# Patient Record
Sex: MALE | Race: WHITE | Employment: OTHER | URBAN - METROPOLITAN AREA
[De-identification: names, ages, dates, MRNs, and addresses within clinical notes are randomized per-mention and may not be internally consistent; named-entity substitution may affect disease eponyms.]

---

## 2017-10-05 ENCOUNTER — HOSPITAL ENCOUNTER (OUTPATIENT)
Dept: PREADMISSION TESTING | Age: 67
Discharge: HOME | End: 2017-10-05
Payer: MEDICARE

## 2017-10-05 VITALS
TEMPERATURE: 97.9 F | DIASTOLIC BLOOD PRESSURE: 84 MMHG | BODY MASS INDEX: 33.33 KG/M2 | SYSTOLIC BLOOD PRESSURE: 118 MMHG | HEART RATE: 96 BPM | WEIGHT: 225 LBS | HEIGHT: 69 IN

## 2017-10-05 LAB
ABO + RH BLD: NORMAL
ANION GAP SERPL CALC-SCNC: 9 MMOL/L (ref 5–15)
APPEARANCE UR: CLEAR
ATRIAL RATE: 78 BPM
BACTERIA URNS QL MICRO: NEGATIVE /HPF
BILIRUB UR QL: NEGATIVE
BLOOD GROUP ANTIBODIES SERPL: NORMAL
BUN SERPL-MCNC: 20 MG/DL (ref 6–20)
BUN/CREAT SERPL: 22 (ref 12–20)
CALCIUM SERPL-MCNC: 8.7 MG/DL (ref 8.5–10.1)
CALCULATED P AXIS, ECG09: 31 DEGREES
CALCULATED R AXIS, ECG10: -26 DEGREES
CALCULATED T AXIS, ECG11: 16 DEGREES
CHLORIDE SERPL-SCNC: 101 MMOL/L (ref 97–108)
CO2 SERPL-SCNC: 27 MMOL/L (ref 21–32)
COLOR UR: NORMAL
CREAT SERPL-MCNC: 0.92 MG/DL (ref 0.7–1.3)
DIAGNOSIS, 93000: NORMAL
EPITH CASTS URNS QL MICRO: NORMAL /LPF
ERYTHROCYTE [DISTWIDTH] IN BLOOD BY AUTOMATED COUNT: 13 % (ref 11.5–14.5)
EST. AVERAGE GLUCOSE BLD GHB EST-MCNC: 166 MG/DL
GLUCOSE SERPL-MCNC: 108 MG/DL (ref 65–100)
GLUCOSE UR STRIP.AUTO-MCNC: NEGATIVE MG/DL
HBA1C MFR BLD: 7.4 % (ref 4.2–6.3)
HCT VFR BLD AUTO: 40.9 % (ref 36.6–50.3)
HGB BLD-MCNC: 14 G/DL (ref 12.1–17)
HGB UR QL STRIP: NEGATIVE
HYALINE CASTS URNS QL MICRO: NORMAL /LPF (ref 0–5)
INR PPP: 1 (ref 0.9–1.1)
KETONES UR QL STRIP.AUTO: NEGATIVE MG/DL
LEUKOCYTE ESTERASE UR QL STRIP.AUTO: NEGATIVE
MCH RBC QN AUTO: 32 PG (ref 26–34)
MCHC RBC AUTO-ENTMCNC: 34.2 G/DL (ref 30–36.5)
MCV RBC AUTO: 93.6 FL (ref 80–99)
NITRITE UR QL STRIP.AUTO: NEGATIVE
P-R INTERVAL, ECG05: 212 MS
PH UR STRIP: 5.5 [PH] (ref 5–8)
PLATELET # BLD AUTO: 155 K/UL (ref 150–400)
POTASSIUM SERPL-SCNC: 4.4 MMOL/L (ref 3.5–5.1)
PROT UR STRIP-MCNC: NEGATIVE MG/DL
PROTHROMBIN TIME: 10.5 SEC (ref 9–11.1)
Q-T INTERVAL, ECG07: 366 MS
QRS DURATION, ECG06: 82 MS
QTC CALCULATION (BEZET), ECG08: 417 MS
RBC # BLD AUTO: 4.37 M/UL (ref 4.1–5.7)
RBC #/AREA URNS HPF: NORMAL /HPF (ref 0–5)
SODIUM SERPL-SCNC: 137 MMOL/L (ref 136–145)
SP GR UR REFRACTOMETRY: 1.02 (ref 1–1.03)
SPECIMEN EXP DATE BLD: NORMAL
UA: UC IF INDICATED,UAUC: NORMAL
UROBILINOGEN UR QL STRIP.AUTO: 0.2 EU/DL (ref 0.2–1)
VENTRICULAR RATE, ECG03: 78 BPM
WBC # BLD AUTO: 8.7 K/UL (ref 4.1–11.1)
WBC URNS QL MICRO: NORMAL /HPF (ref 0–4)

## 2017-10-05 PROCEDURE — 83036 HEMOGLOBIN GLYCOSYLATED A1C: CPT | Performed by: ORTHOPAEDIC SURGERY

## 2017-10-05 PROCEDURE — 86900 BLOOD TYPING SEROLOGIC ABO: CPT | Performed by: ORTHOPAEDIC SURGERY

## 2017-10-05 PROCEDURE — 81001 URINALYSIS AUTO W/SCOPE: CPT | Performed by: ORTHOPAEDIC SURGERY

## 2017-10-05 PROCEDURE — 93005 ELECTROCARDIOGRAM TRACING: CPT

## 2017-10-05 PROCEDURE — 85027 COMPLETE CBC AUTOMATED: CPT | Performed by: ORTHOPAEDIC SURGERY

## 2017-10-05 PROCEDURE — 80048 BASIC METABOLIC PNL TOTAL CA: CPT | Performed by: ORTHOPAEDIC SURGERY

## 2017-10-05 PROCEDURE — 85610 PROTHROMBIN TIME: CPT | Performed by: ORTHOPAEDIC SURGERY

## 2017-10-05 PROCEDURE — 36415 COLL VENOUS BLD VENIPUNCTURE: CPT | Performed by: ORTHOPAEDIC SURGERY

## 2017-10-05 RX ORDER — DIAZEPAM 5 MG/1
5 TABLET ORAL AS NEEDED
COMMUNITY

## 2017-10-05 RX ORDER — GLIMEPIRIDE 4 MG/1
TABLET ORAL 2 TIMES DAILY
COMMUNITY
End: 2019-07-01

## 2017-10-05 RX ORDER — SIMVASTATIN 40 MG/1
40 TABLET, FILM COATED ORAL
COMMUNITY

## 2017-10-05 RX ORDER — TRAMADOL HYDROCHLORIDE 50 MG/1
50 TABLET ORAL 2 TIMES DAILY
COMMUNITY
End: 2017-10-31

## 2017-10-05 RX ORDER — ESCITALOPRAM OXALATE 20 MG/1
20 TABLET ORAL
COMMUNITY
End: 2019-07-01

## 2017-10-05 RX ORDER — BUTALBITAL, ACETAMINOPHEN AND CAFFEINE 50; 325; 40 MG/1; MG/1; MG/1
1 TABLET ORAL
COMMUNITY
End: 2019-07-01

## 2017-10-06 LAB
BACTERIA SPEC CULT: NORMAL
BACTERIA SPEC CULT: NORMAL
SERVICE CMNT-IMP: NORMAL

## 2017-10-26 NOTE — H&P
PCP: Lexx Sandhu MD       Patient Active Problem List     Diagnosis Date Noted    Type 2 diabetes mellitus without complication 97/89/4353    Essential hypertension 09/28/2017    Coagulopathy 09/28/2017    High cholesterol 09/28/2017            Subjective   Subjective:   Chief Complaint: Pain of the Right Hip        HPI: Sue Muller is a 74 y.o. male who comes in today with chronic progressive right hip and groin pain. It now limits his everyday activities. He is no longer able to walk for exercise. He complains of stiffness and pain. He is having night pain. He has been taking tramadol for pain. He does have a coagulopathy and is followed by [facility] hemat[facility] for this. He denies any radicular symptoms. He was seen by his primary care physician and had x-rays confirming severe end-stage osteoarthritis.                   Objective   Objective:          Vitals:     09/28/17 1041   BP: 114/64   Pulse: (!) 98      Height: 6'   Weight: 219 lb   Body mass index is 29.7 kg/(m^2).     Constitutional:  No acute distress. Well nourished. Well developed. Eyes:  Sclera are nonicteric. Respiratory:  No labored breathing. Cardiovascular:  No marked edema. Skin:  No marked skin ulcers. Neurological:  No marked sensory loss noted. Psychiatric: Alert and oriented x3. Musculoskeletal :  He has normal range of motion of the left hip. He has a fixed external rotation and flexion contracture of the right hip with essentially no motion of the right hip. He has severe pain with attempts to move the right hip. He has normal range of motion of bilateral knees without effusion or crepitus. He has no instability in his knees. He has a strong pedal pulse. He has no pedal edema. His skin is healthy and intact. He has no abnormal bruising or petechiae.  His right leg is shorter than the left by approximately 1 inch.        Radiographs:    X-rays that he brings with him show severe end-stage osteoarthritis of the right hip with a normal left hip. The right hip has complete loss of joint space with subchondral sclerosis and cyst formation.              Assessment   Assessment:      1. Primary osteoarthritis of right hip                Plan   Plan:     I reviewed his x-ray findings with him and his wife. He has severe end-stage osteoarthritis of the right hip. We discussed treatment options. He is anxious to proceed with hip replacement. I went over the procedure in detail including expected outcomes and postop recovery as well as risks and complications specifically DVT, PE, infection, dislocation, leg length discrepancy, nerve injury. I discussed his case with his  hematologist to will get get him in and evaluate him. We will need to address the issue of intraoperative bleeding as well as recommendations for postoperative DVT prophylaxis. We will set this up for him at his convenience.     No orders of the defined types were placed in this encounter.      Return for scheduled surgery.            Josefa Mendoza MD   9/28/2017  7:56 PM

## 2017-10-27 ENCOUNTER — ANESTHESIA EVENT (OUTPATIENT)
Dept: SURGERY | Age: 67
DRG: 470 | End: 2017-10-27
Payer: MEDICARE

## 2017-10-30 ENCOUNTER — APPOINTMENT (OUTPATIENT)
Dept: GENERAL RADIOLOGY | Age: 67
DRG: 470 | End: 2017-10-30
Attending: ORTHOPAEDIC SURGERY
Payer: MEDICARE

## 2017-10-30 ENCOUNTER — HOSPITAL ENCOUNTER (INPATIENT)
Age: 67
LOS: 1 days | Discharge: HOME | DRG: 470 | End: 2017-10-31
Attending: ORTHOPAEDIC SURGERY | Admitting: ORTHOPAEDIC SURGERY
Payer: MEDICARE

## 2017-10-30 ENCOUNTER — ANESTHESIA (OUTPATIENT)
Dept: SURGERY | Age: 67
DRG: 470 | End: 2017-10-30
Payer: MEDICARE

## 2017-10-30 PROBLEM — M16.11 PRIMARY OSTEOARTHRITIS OF RIGHT HIP: Status: ACTIVE | Noted: 2017-10-30

## 2017-10-30 LAB
ABO + RH BLD: NORMAL
BLOOD GROUP ANTIBODIES SERPL: NORMAL
EST. AVERAGE GLUCOSE BLD GHB EST-MCNC: 157 MG/DL
GLUCOSE BLD STRIP.AUTO-MCNC: 187 MG/DL (ref 65–100)
GLUCOSE BLD STRIP.AUTO-MCNC: 207 MG/DL (ref 65–100)
GLUCOSE BLD STRIP.AUTO-MCNC: 366 MG/DL (ref 65–100)
HBA1C MFR BLD: 7.1 % (ref 4.2–6.3)
SERVICE CMNT-IMP: ABNORMAL
SPECIMEN EXP DATE BLD: NORMAL

## 2017-10-30 PROCEDURE — 82962 GLUCOSE BLOOD TEST: CPT

## 2017-10-30 PROCEDURE — 73501 X-RAY EXAM HIP UNI 1 VIEW: CPT

## 2017-10-30 PROCEDURE — 77030006835 HC BLD SAW SAG STRY -B: Performed by: ORTHOPAEDIC SURGERY

## 2017-10-30 PROCEDURE — 77030002933 HC SUT MCRYL J&J -A: Performed by: ORTHOPAEDIC SURGERY

## 2017-10-30 PROCEDURE — 74011250637 HC RX REV CODE- 250/637: Performed by: PHYSICIAN ASSISTANT

## 2017-10-30 PROCEDURE — 86900 BLOOD TYPING SEROLOGIC ABO: CPT | Performed by: ORTHOPAEDIC SURGERY

## 2017-10-30 PROCEDURE — 77030012935 HC DRSG AQUACEL BMS -B: Performed by: ORTHOPAEDIC SURGERY

## 2017-10-30 PROCEDURE — 76060000035 HC ANESTHESIA 2 TO 2.5 HR: Performed by: ORTHOPAEDIC SURGERY

## 2017-10-30 PROCEDURE — 77030018836 HC SOL IRR NACL ICUM -A: Performed by: ORTHOPAEDIC SURGERY

## 2017-10-30 PROCEDURE — 65270000029 HC RM PRIVATE

## 2017-10-30 PROCEDURE — 74011250636 HC RX REV CODE- 250/636: Performed by: ANESTHESIOLOGY

## 2017-10-30 PROCEDURE — 83036 HEMOGLOBIN GLYCOSYLATED A1C: CPT | Performed by: PHYSICIAN ASSISTANT

## 2017-10-30 PROCEDURE — 76210000000 HC OR PH I REC 2 TO 2.5 HR: Performed by: ORTHOPAEDIC SURGERY

## 2017-10-30 PROCEDURE — 74011250636 HC RX REV CODE- 250/636

## 2017-10-30 PROCEDURE — C1776 JOINT DEVICE (IMPLANTABLE): HCPCS | Performed by: ORTHOPAEDIC SURGERY

## 2017-10-30 PROCEDURE — 77030010507 HC ADH SKN DERMBND J&J -B: Performed by: ORTHOPAEDIC SURGERY

## 2017-10-30 PROCEDURE — 0SR903Z REPLACEMENT OF RIGHT HIP JOINT WITH CERAMIC SYNTHETIC SUBSTITUTE, OPEN APPROACH: ICD-10-PCS | Performed by: ORTHOPAEDIC SURGERY

## 2017-10-30 PROCEDURE — 74011250636 HC RX REV CODE- 250/636: Performed by: PHYSICIAN ASSISTANT

## 2017-10-30 PROCEDURE — 77030008684 HC TU ET CUF COVD -B: Performed by: ANESTHESIOLOGY

## 2017-10-30 PROCEDURE — 77030003666 HC NDL SPINAL BD -A: Performed by: ORTHOPAEDIC SURGERY

## 2017-10-30 PROCEDURE — 76010000171 HC OR TIME 2 TO 2.5 HR INTENSV-TIER 1: Performed by: ORTHOPAEDIC SURGERY

## 2017-10-30 PROCEDURE — 77030011264 HC ELECTRD BLD EXT COVD -A: Performed by: ORTHOPAEDIC SURGERY

## 2017-10-30 PROCEDURE — 77030034850: Performed by: ORTHOPAEDIC SURGERY

## 2017-10-30 PROCEDURE — 74011000258 HC RX REV CODE- 258

## 2017-10-30 PROCEDURE — 77030020782 HC GWN BAIR PAWS FLX 3M -B

## 2017-10-30 PROCEDURE — 74011000250 HC RX REV CODE- 250: Performed by: ORTHOPAEDIC SURGERY

## 2017-10-30 PROCEDURE — 74011636637 HC RX REV CODE- 636/637: Performed by: PHYSICIAN ASSISTANT

## 2017-10-30 PROCEDURE — 77030035236 HC SUT PDS STRATFX BARB J&J -B: Performed by: ORTHOPAEDIC SURGERY

## 2017-10-30 PROCEDURE — 77030032490 HC SLV COMPR SCD KNE COVD -B: Performed by: ORTHOPAEDIC SURGERY

## 2017-10-30 PROCEDURE — 76000 FLUOROSCOPY <1 HR PHYS/QHP: CPT

## 2017-10-30 PROCEDURE — 77030020788: Performed by: ORTHOPAEDIC SURGERY

## 2017-10-30 PROCEDURE — 77030011640 HC PAD GRND REM COVD -A: Performed by: ORTHOPAEDIC SURGERY

## 2017-10-30 PROCEDURE — 36415 COLL VENOUS BLD VENIPUNCTURE: CPT | Performed by: PHYSICIAN ASSISTANT

## 2017-10-30 PROCEDURE — 74011000250 HC RX REV CODE- 250

## 2017-10-30 PROCEDURE — 77030018846 HC SOL IRR STRL H20 ICUM -A: Performed by: ORTHOPAEDIC SURGERY

## 2017-10-30 PROCEDURE — 77030031139 HC SUT VCRL2 J&J -A: Performed by: ORTHOPAEDIC SURGERY

## 2017-10-30 PROCEDURE — 74011250636 HC RX REV CODE- 250/636: Performed by: ORTHOPAEDIC SURGERY

## 2017-10-30 PROCEDURE — 74011636637 HC RX REV CODE- 636/637: Performed by: ORTHOPAEDIC SURGERY

## 2017-10-30 DEVICE — HEAD FEM DIA36MM +1.5MM OFFSET 12/14 TAPR HIP CERAMIC: Type: IMPLANTABLE DEVICE | Site: HIP | Status: FUNCTIONAL

## 2017-10-30 DEVICE — CUP ACET DIA52MM HIP GRIPTION PRI CEMENTLESS FIX SECT SER: Type: IMPLANTABLE DEVICE | Site: HIP | Status: FUNCTIONAL

## 2017-10-30 DEVICE — LINER ACET OD52MM ID36MM HIP ALTRX PINN: Type: IMPLANTABLE DEVICE | Site: HIP | Status: FUNCTIONAL

## 2017-10-30 DEVICE — SCREW BNE L25MM DIA6.5MM CANC HIP S STL GRIPTION FULL THRD: Type: IMPLANTABLE DEVICE | Site: HIP | Status: FUNCTIONAL

## 2017-10-30 DEVICE — STEM FEMORAL SUMMIT: Type: IMPLANTABLE DEVICE | Site: HIP | Status: FUNCTIONAL

## 2017-10-30 DEVICE — COMPONENT TOT HIP PRIMARY CERM ALTRX: Type: IMPLANTABLE DEVICE | Status: FUNCTIONAL

## 2017-10-30 RX ORDER — MELOXICAM 7.5 MG/1
15 TABLET ORAL DAILY
Status: DISCONTINUED | OUTPATIENT
Start: 2017-10-31 | End: 2017-10-31 | Stop reason: HOSPADM

## 2017-10-30 RX ORDER — CEFAZOLIN SODIUM IN 0.9 % NACL 2 G/50 ML
2 INTRAVENOUS SOLUTION, PIGGYBACK (ML) INTRAVENOUS EVERY 8 HOURS
Status: COMPLETED | OUTPATIENT
Start: 2017-10-30 | End: 2017-10-31

## 2017-10-30 RX ORDER — CELECOXIB 200 MG/1
200 CAPSULE ORAL ONCE
Status: COMPLETED | OUTPATIENT
Start: 2017-10-30 | End: 2017-10-30

## 2017-10-30 RX ORDER — HYDROXYZINE HYDROCHLORIDE 10 MG/1
10 TABLET, FILM COATED ORAL
Status: DISCONTINUED | OUTPATIENT
Start: 2017-10-30 | End: 2017-10-31 | Stop reason: HOSPADM

## 2017-10-30 RX ORDER — GLYCOPYRROLATE 0.2 MG/ML
0.2 INJECTION INTRAMUSCULAR; INTRAVENOUS
Status: DISCONTINUED | OUTPATIENT
Start: 2017-10-30 | End: 2017-10-30 | Stop reason: HOSPADM

## 2017-10-30 RX ORDER — POLYETHYLENE GLYCOL 3350 17 G/17G
17 POWDER, FOR SOLUTION ORAL DAILY
Status: DISCONTINUED | OUTPATIENT
Start: 2017-10-31 | End: 2017-10-31 | Stop reason: HOSPADM

## 2017-10-30 RX ORDER — BUPIVACAINE HYDROCHLORIDE AND EPINEPHRINE 2.5; 5 MG/ML; UG/ML
INJECTION, SOLUTION EPIDURAL; INFILTRATION; INTRACAUDAL; PERINEURAL AS NEEDED
Status: DISCONTINUED | OUTPATIENT
Start: 2017-10-30 | End: 2017-10-30 | Stop reason: HOSPADM

## 2017-10-30 RX ORDER — SODIUM CHLORIDE, SODIUM LACTATE, POTASSIUM CHLORIDE, CALCIUM CHLORIDE 600; 310; 30; 20 MG/100ML; MG/100ML; MG/100ML; MG/100ML
1000 INJECTION, SOLUTION INTRAVENOUS CONTINUOUS
Status: DISCONTINUED | OUTPATIENT
Start: 2017-10-30 | End: 2017-10-30 | Stop reason: HOSPADM

## 2017-10-30 RX ORDER — OXYCODONE HYDROCHLORIDE 5 MG/1
5 TABLET ORAL
Status: DISCONTINUED | OUTPATIENT
Start: 2017-10-30 | End: 2017-10-31 | Stop reason: HOSPADM

## 2017-10-30 RX ORDER — OXYCODONE HYDROCHLORIDE 5 MG/1
7.5-1 TABLET ORAL
Status: DISCONTINUED | OUTPATIENT
Start: 2017-10-30 | End: 2017-10-31 | Stop reason: HOSPADM

## 2017-10-30 RX ORDER — DEXAMETHASONE SODIUM PHOSPHATE 4 MG/ML
INJECTION, SOLUTION INTRA-ARTICULAR; INTRALESIONAL; INTRAMUSCULAR; INTRAVENOUS; SOFT TISSUE AS NEEDED
Status: DISCONTINUED | OUTPATIENT
Start: 2017-10-30 | End: 2017-10-30 | Stop reason: HOSPADM

## 2017-10-30 RX ORDER — ACETAMINOPHEN 500 MG
1000 TABLET ORAL EVERY 6 HOURS
Status: DISCONTINUED | OUTPATIENT
Start: 2017-10-30 | End: 2017-10-31 | Stop reason: HOSPADM

## 2017-10-30 RX ORDER — ROPIVACAINE HYDROCHLORIDE 5 MG/ML
30 INJECTION, SOLUTION EPIDURAL; INFILTRATION; PERINEURAL AS NEEDED
Status: DISCONTINUED | OUTPATIENT
Start: 2017-10-30 | End: 2017-10-30 | Stop reason: HOSPADM

## 2017-10-30 RX ORDER — LIDOCAINE HYDROCHLORIDE 10 MG/ML
0.1 INJECTION, SOLUTION EPIDURAL; INFILTRATION; INTRACAUDAL; PERINEURAL AS NEEDED
Status: DISCONTINUED | OUTPATIENT
Start: 2017-10-30 | End: 2017-10-30 | Stop reason: HOSPADM

## 2017-10-30 RX ORDER — MORPHINE SULFATE 10 MG/ML
2 INJECTION, SOLUTION INTRAMUSCULAR; INTRAVENOUS
Status: DISCONTINUED | OUTPATIENT
Start: 2017-10-30 | End: 2017-10-30 | Stop reason: HOSPADM

## 2017-10-30 RX ORDER — DEXMEDETOMIDINE HYDROCHLORIDE 4 UG/ML
INJECTION, SOLUTION INTRAVENOUS AS NEEDED
Status: DISCONTINUED | OUTPATIENT
Start: 2017-10-30 | End: 2017-10-30 | Stop reason: HOSPADM

## 2017-10-30 RX ORDER — LOSARTAN POTASSIUM 50 MG/1
50 TABLET ORAL DAILY
Status: DISCONTINUED | OUTPATIENT
Start: 2017-10-31 | End: 2017-10-31 | Stop reason: HOSPADM

## 2017-10-30 RX ORDER — CEFAZOLIN SODIUM IN 0.9 % NACL 2 G/100 ML
PLASTIC BAG, INJECTION (ML) INTRAVENOUS AS NEEDED
Status: DISCONTINUED | OUTPATIENT
Start: 2017-10-30 | End: 2017-10-30 | Stop reason: HOSPADM

## 2017-10-30 RX ORDER — HYDROMORPHONE HYDROCHLORIDE 1 MG/ML
0.2 INJECTION, SOLUTION INTRAMUSCULAR; INTRAVENOUS; SUBCUTANEOUS
Status: DISCONTINUED | OUTPATIENT
Start: 2017-10-30 | End: 2017-10-30 | Stop reason: HOSPADM

## 2017-10-30 RX ORDER — ACETAMINOPHEN 500 MG
1000 TABLET ORAL ONCE
Status: COMPLETED | OUTPATIENT
Start: 2017-10-30 | End: 2017-10-30

## 2017-10-30 RX ORDER — ESCITALOPRAM OXALATE 10 MG/1
20 TABLET ORAL
Status: DISCONTINUED | OUTPATIENT
Start: 2017-10-31 | End: 2017-10-31 | Stop reason: HOSPADM

## 2017-10-30 RX ORDER — DIPHENHYDRAMINE HYDROCHLORIDE 50 MG/ML
12.5 INJECTION, SOLUTION INTRAMUSCULAR; INTRAVENOUS AS NEEDED
Status: DISCONTINUED | OUTPATIENT
Start: 2017-10-30 | End: 2017-10-30 | Stop reason: HOSPADM

## 2017-10-30 RX ORDER — INSULIN LISPRO 100 [IU]/ML
INJECTION, SOLUTION INTRAVENOUS; SUBCUTANEOUS
Status: DISCONTINUED | OUTPATIENT
Start: 2017-10-30 | End: 2017-10-31 | Stop reason: HOSPADM

## 2017-10-30 RX ORDER — FENTANYL CITRATE 50 UG/ML
25 INJECTION, SOLUTION INTRAMUSCULAR; INTRAVENOUS
Status: DISCONTINUED | OUTPATIENT
Start: 2017-10-30 | End: 2017-10-30 | Stop reason: HOSPADM

## 2017-10-30 RX ORDER — MAGNESIUM SULFATE 100 %
4 CRYSTALS MISCELLANEOUS AS NEEDED
Status: DISCONTINUED | OUTPATIENT
Start: 2017-10-30 | End: 2017-10-31 | Stop reason: HOSPADM

## 2017-10-30 RX ORDER — MIDAZOLAM HYDROCHLORIDE 1 MG/ML
0.5 INJECTION, SOLUTION INTRAMUSCULAR; INTRAVENOUS
Status: DISCONTINUED | OUTPATIENT
Start: 2017-10-30 | End: 2017-10-30 | Stop reason: HOSPADM

## 2017-10-30 RX ORDER — SODIUM CHLORIDE 0.9 % (FLUSH) 0.9 %
5-10 SYRINGE (ML) INJECTION EVERY 8 HOURS
Status: DISCONTINUED | OUTPATIENT
Start: 2017-10-31 | End: 2017-10-31 | Stop reason: HOSPADM

## 2017-10-30 RX ORDER — AMOXICILLIN 250 MG
1 CAPSULE ORAL 2 TIMES DAILY
Status: DISCONTINUED | OUTPATIENT
Start: 2017-10-30 | End: 2017-10-31 | Stop reason: HOSPADM

## 2017-10-30 RX ORDER — ONDANSETRON 2 MG/ML
4 INJECTION INTRAMUSCULAR; INTRAVENOUS AS NEEDED
Status: DISCONTINUED | OUTPATIENT
Start: 2017-10-30 | End: 2017-10-30 | Stop reason: HOSPADM

## 2017-10-30 RX ORDER — MIDAZOLAM HYDROCHLORIDE 1 MG/ML
INJECTION, SOLUTION INTRAMUSCULAR; INTRAVENOUS AS NEEDED
Status: DISCONTINUED | OUTPATIENT
Start: 2017-10-30 | End: 2017-10-30 | Stop reason: HOSPADM

## 2017-10-30 RX ORDER — HYDROCODONE BITARTRATE AND ACETAMINOPHEN 5; 325 MG/1; MG/1
1 TABLET ORAL AS NEEDED
Status: DISCONTINUED | OUTPATIENT
Start: 2017-10-30 | End: 2017-10-30 | Stop reason: HOSPADM

## 2017-10-30 RX ORDER — KETAMINE HYDROCHLORIDE 50 MG/ML
INJECTION, SOLUTION INTRAMUSCULAR; INTRAVENOUS AS NEEDED
Status: DISCONTINUED | OUTPATIENT
Start: 2017-10-30 | End: 2017-10-30 | Stop reason: HOSPADM

## 2017-10-30 RX ORDER — MORPHINE SULFATE 2 MG/ML
2 INJECTION, SOLUTION INTRAMUSCULAR; INTRAVENOUS
Status: DISCONTINUED | OUTPATIENT
Start: 2017-10-30 | End: 2017-10-31 | Stop reason: HOSPADM

## 2017-10-30 RX ORDER — CEFAZOLIN SODIUM IN 0.9 % NACL 2 G/50 ML
2 INTRAVENOUS SOLUTION, PIGGYBACK (ML) INTRAVENOUS ONCE
Status: DISCONTINUED | OUTPATIENT
Start: 2017-10-30 | End: 2017-10-30 | Stop reason: HOSPADM

## 2017-10-30 RX ORDER — SIMVASTATIN 40 MG/1
40 TABLET, FILM COATED ORAL
Status: DISCONTINUED | OUTPATIENT
Start: 2017-10-30 | End: 2017-10-31 | Stop reason: HOSPADM

## 2017-10-30 RX ORDER — DEXTROSE 50 % IN WATER (D50W) INTRAVENOUS SYRINGE
12.5-25 AS NEEDED
Status: DISCONTINUED | OUTPATIENT
Start: 2017-10-30 | End: 2017-10-31 | Stop reason: HOSPADM

## 2017-10-30 RX ORDER — DIAZEPAM 2 MG/1
5 TABLET ORAL
Status: DISCONTINUED | OUTPATIENT
Start: 2017-10-30 | End: 2017-10-31 | Stop reason: HOSPADM

## 2017-10-30 RX ORDER — ONDANSETRON 2 MG/ML
INJECTION INTRAMUSCULAR; INTRAVENOUS AS NEEDED
Status: DISCONTINUED | OUTPATIENT
Start: 2017-10-30 | End: 2017-10-30 | Stop reason: HOSPADM

## 2017-10-30 RX ORDER — SODIUM CHLORIDE 0.9 % (FLUSH) 0.9 %
5-10 SYRINGE (ML) INJECTION AS NEEDED
Status: DISCONTINUED | OUTPATIENT
Start: 2017-10-30 | End: 2017-10-31 | Stop reason: HOSPADM

## 2017-10-30 RX ORDER — INSULIN LISPRO 100 [IU]/ML
4 INJECTION, SOLUTION INTRAVENOUS; SUBCUTANEOUS ONCE
Status: COMPLETED | OUTPATIENT
Start: 2017-10-30 | End: 2017-10-30

## 2017-10-30 RX ORDER — DEXAMETHASONE SODIUM PHOSPHATE 4 MG/ML
4 INJECTION, SOLUTION INTRA-ARTICULAR; INTRALESIONAL; INTRAMUSCULAR; INTRAVENOUS; SOFT TISSUE
Status: DISCONTINUED | OUTPATIENT
Start: 2017-10-30 | End: 2017-10-31 | Stop reason: HOSPADM

## 2017-10-30 RX ORDER — SODIUM CHLORIDE 9 MG/ML
25 INJECTION, SOLUTION INTRAVENOUS CONTINUOUS
Status: DISCONTINUED | OUTPATIENT
Start: 2017-10-30 | End: 2017-10-30 | Stop reason: HOSPADM

## 2017-10-30 RX ORDER — NALOXONE HYDROCHLORIDE 0.4 MG/ML
0.4 INJECTION, SOLUTION INTRAMUSCULAR; INTRAVENOUS; SUBCUTANEOUS AS NEEDED
Status: DISCONTINUED | OUTPATIENT
Start: 2017-10-30 | End: 2017-10-31 | Stop reason: HOSPADM

## 2017-10-30 RX ORDER — HYDROMORPHONE HYDROCHLORIDE 1 MG/ML
INJECTION, SOLUTION INTRAMUSCULAR; INTRAVENOUS; SUBCUTANEOUS AS NEEDED
Status: DISCONTINUED | OUTPATIENT
Start: 2017-10-30 | End: 2017-10-30 | Stop reason: HOSPADM

## 2017-10-30 RX ORDER — FENTANYL CITRATE 50 UG/ML
INJECTION, SOLUTION INTRAMUSCULAR; INTRAVENOUS AS NEEDED
Status: DISCONTINUED | OUTPATIENT
Start: 2017-10-30 | End: 2017-10-30 | Stop reason: HOSPADM

## 2017-10-30 RX ORDER — ALBUTEROL SULFATE 0.83 MG/ML
2.5 SOLUTION RESPIRATORY (INHALATION) AS NEEDED
Status: DISCONTINUED | OUTPATIENT
Start: 2017-10-30 | End: 2017-10-30 | Stop reason: HOSPADM

## 2017-10-30 RX ORDER — FENTANYL CITRATE 50 UG/ML
50 INJECTION, SOLUTION INTRAMUSCULAR; INTRAVENOUS AS NEEDED
Status: DISCONTINUED | OUTPATIENT
Start: 2017-10-30 | End: 2017-10-30 | Stop reason: HOSPADM

## 2017-10-30 RX ORDER — ONDANSETRON 2 MG/ML
4 INJECTION INTRAMUSCULAR; INTRAVENOUS
Status: DISCONTINUED | OUTPATIENT
Start: 2017-10-30 | End: 2017-10-31 | Stop reason: HOSPADM

## 2017-10-30 RX ORDER — MIDAZOLAM HYDROCHLORIDE 1 MG/ML
1 INJECTION, SOLUTION INTRAMUSCULAR; INTRAVENOUS AS NEEDED
Status: DISCONTINUED | OUTPATIENT
Start: 2017-10-30 | End: 2017-10-30 | Stop reason: HOSPADM

## 2017-10-30 RX ORDER — PHENYLEPHRINE HCL IN 0.9% NACL 0.4MG/10ML
SYRINGE (ML) INTRAVENOUS AS NEEDED
Status: DISCONTINUED | OUTPATIENT
Start: 2017-10-30 | End: 2017-10-30 | Stop reason: HOSPADM

## 2017-10-30 RX ORDER — FAMOTIDINE 20 MG/1
20 TABLET, FILM COATED ORAL 2 TIMES DAILY
Status: DISCONTINUED | OUTPATIENT
Start: 2017-10-30 | End: 2017-10-31 | Stop reason: HOSPADM

## 2017-10-30 RX ORDER — LIDOCAINE HYDROCHLORIDE 20 MG/ML
INJECTION, SOLUTION EPIDURAL; INFILTRATION; INTRACAUDAL; PERINEURAL AS NEEDED
Status: DISCONTINUED | OUTPATIENT
Start: 2017-10-30 | End: 2017-10-30 | Stop reason: HOSPADM

## 2017-10-30 RX ORDER — ROCURONIUM BROMIDE 10 MG/ML
INJECTION, SOLUTION INTRAVENOUS AS NEEDED
Status: DISCONTINUED | OUTPATIENT
Start: 2017-10-30 | End: 2017-10-30 | Stop reason: HOSPADM

## 2017-10-30 RX ORDER — PROPOFOL 10 MG/ML
INJECTION, EMULSION INTRAVENOUS AS NEEDED
Status: DISCONTINUED | OUTPATIENT
Start: 2017-10-30 | End: 2017-10-30 | Stop reason: HOSPADM

## 2017-10-30 RX ORDER — SODIUM CHLORIDE 9 MG/ML
125 INJECTION, SOLUTION INTRAVENOUS CONTINUOUS
Status: DISCONTINUED | OUTPATIENT
Start: 2017-10-30 | End: 2017-10-31 | Stop reason: HOSPADM

## 2017-10-30 RX ORDER — FACIAL-BODY WIPES
10 EACH TOPICAL DAILY PRN
Status: DISCONTINUED | OUTPATIENT
Start: 2017-11-01 | End: 2017-10-31 | Stop reason: HOSPADM

## 2017-10-30 RX ORDER — SUCCINYLCHOLINE CHLORIDE 20 MG/ML
INJECTION INTRAMUSCULAR; INTRAVENOUS AS NEEDED
Status: DISCONTINUED | OUTPATIENT
Start: 2017-10-30 | End: 2017-10-30 | Stop reason: HOSPADM

## 2017-10-30 RX ORDER — PREGABALIN 75 MG/1
75 CAPSULE ORAL ONCE
Status: COMPLETED | OUTPATIENT
Start: 2017-10-30 | End: 2017-10-30

## 2017-10-30 RX ADMIN — SUCCINYLCHOLINE CHLORIDE 140 MG: 20 INJECTION INTRAMUSCULAR; INTRAVENOUS at 14:48

## 2017-10-30 RX ADMIN — MIDAZOLAM HYDROCHLORIDE 2 MG: 1 INJECTION, SOLUTION INTRAMUSCULAR; INTRAVENOUS at 14:42

## 2017-10-30 RX ADMIN — ACETAMINOPHEN 1000 MG: 500 TABLET, FILM COATED ORAL at 13:04

## 2017-10-30 RX ADMIN — Medication 80 MCG: at 16:40

## 2017-10-30 RX ADMIN — SODIUM CHLORIDE, SODIUM LACTATE, POTASSIUM CHLORIDE, AND CALCIUM CHLORIDE 1000 ML: 600; 310; 30; 20 INJECTION, SOLUTION INTRAVENOUS at 12:54

## 2017-10-30 RX ADMIN — ACETAMINOPHEN 1000 MG: 500 TABLET, FILM COATED ORAL at 20:19

## 2017-10-30 RX ADMIN — FAMOTIDINE 20 MG: 20 TABLET, FILM COATED ORAL at 20:19

## 2017-10-30 RX ADMIN — INSULIN LISPRO 3 UNITS: 100 INJECTION, SOLUTION INTRAVENOUS; SUBCUTANEOUS at 17:33

## 2017-10-30 RX ADMIN — DEXMEDETOMIDINE HYDROCHLORIDE 4 MCG: 4 INJECTION, SOLUTION INTRAVENOUS at 16:43

## 2017-10-30 RX ADMIN — KETAMINE HYDROCHLORIDE 30 MG: 50 INJECTION, SOLUTION INTRAMUSCULAR; INTRAVENOUS at 15:21

## 2017-10-30 RX ADMIN — PROPOFOL 150 MG: 10 INJECTION, EMULSION INTRAVENOUS at 14:48

## 2017-10-30 RX ADMIN — ONDANSETRON 4 MG: 2 INJECTION INTRAMUSCULAR; INTRAVENOUS at 16:49

## 2017-10-30 RX ADMIN — STANDARDIZED SENNA CONCENTRATE AND DOCUSATE SODIUM 1 TABLET: 8.6; 5 TABLET, FILM COATED ORAL at 20:19

## 2017-10-30 RX ADMIN — CEFAZOLIN 2 G: 1 INJECTION, POWDER, FOR SOLUTION INTRAMUSCULAR; INTRAVENOUS; PARENTERAL at 22:59

## 2017-10-30 RX ADMIN — Medication 40 MCG: at 16:38

## 2017-10-30 RX ADMIN — SODIUM CHLORIDE 125 ML/HR: 900 INJECTION, SOLUTION INTRAVENOUS at 17:51

## 2017-10-30 RX ADMIN — DEXMEDETOMIDINE HYDROCHLORIDE 4 MCG: 4 INJECTION, SOLUTION INTRAVENOUS at 16:48

## 2017-10-30 RX ADMIN — DEXMEDETOMIDINE HYDROCHLORIDE 4 MCG: 4 INJECTION, SOLUTION INTRAVENOUS at 16:36

## 2017-10-30 RX ADMIN — DEXAMETHASONE SODIUM PHOSPHATE 4 MG: 4 INJECTION, SOLUTION INTRA-ARTICULAR; INTRALESIONAL; INTRAMUSCULAR; INTRAVENOUS; SOFT TISSUE at 15:13

## 2017-10-30 RX ADMIN — DEXMEDETOMIDINE HYDROCHLORIDE 4 MCG: 4 INJECTION, SOLUTION INTRAVENOUS at 16:52

## 2017-10-30 RX ADMIN — CELECOXIB 200 MG: 200 CAPSULE ORAL at 13:04

## 2017-10-30 RX ADMIN — FENTANYL CITRATE 50 MCG: 50 INJECTION, SOLUTION INTRAMUSCULAR; INTRAVENOUS at 14:48

## 2017-10-30 RX ADMIN — DEXMEDETOMIDINE HYDROCHLORIDE 4 MCG: 4 INJECTION, SOLUTION INTRAVENOUS at 16:04

## 2017-10-30 RX ADMIN — ROCURONIUM BROMIDE 5 MG: 10 INJECTION, SOLUTION INTRAVENOUS at 14:48

## 2017-10-30 RX ADMIN — LIDOCAINE HYDROCHLORIDE 80 MG: 20 INJECTION, SOLUTION EPIDURAL; INFILTRATION; INTRACAUDAL; PERINEURAL at 14:48

## 2017-10-30 RX ADMIN — PREGABALIN 75 MG: 75 CAPSULE ORAL at 13:03

## 2017-10-30 RX ADMIN — Medication 2 G: at 15:17

## 2017-10-30 RX ADMIN — HYDROMORPHONE HYDROCHLORIDE 0.5 MG: 1 INJECTION, SOLUTION INTRAMUSCULAR; INTRAVENOUS; SUBCUTANEOUS at 15:44

## 2017-10-30 RX ADMIN — PROPOFOL 50 MG: 10 INJECTION, EMULSION INTRAVENOUS at 15:04

## 2017-10-30 RX ADMIN — Medication 80 MCG: at 16:15

## 2017-10-30 RX ADMIN — Medication 40 MCG: at 15:27

## 2017-10-30 RX ADMIN — SIMVASTATIN 40 MG: 40 TABLET, FILM COATED ORAL at 21:56

## 2017-10-30 RX ADMIN — INSULIN LISPRO 4 UNITS: 100 INJECTION, SOLUTION INTRAVENOUS; SUBCUTANEOUS at 21:55

## 2017-10-30 RX ADMIN — FENTANYL CITRATE 50 MCG: 50 INJECTION, SOLUTION INTRAMUSCULAR; INTRAVENOUS at 15:08

## 2017-10-30 RX ADMIN — DEXMEDETOMIDINE HYDROCHLORIDE 4 MCG: 4 INJECTION, SOLUTION INTRAVENOUS at 15:39

## 2017-10-30 NOTE — OP NOTES
OPERATIVE NOTE    10/30/2017  DJD RIGHT HIP  DJD RIGHT HIP    PREOPERATIVE DIAGNOSIS: Osteoarthritis, right hip. POSTOPERATIVE DIAGNOSIS: Osteoarthritis, right hip. OPERATIVE PROCEDURE: right total hip replacement. SURGEON: Susi Tierney MD    ASSISTANT SURGEON: Nisha Randolph, [facility]    ANESTHESIA: general anesthesia    ESTIMATED BLOOD LOSS: 150cc. INDICATIONS: A 69 y.o.  male  with end stage osteoarthritis to the right hip, not responsive to conservative management. COMPLICATIONS: None    PROCEDURE: Patient was taken to the operating room and underwent placement of spinal anesthesia. The patient was placed on the [facility] table with both legs in boots, and intermittent compression hose placed on bilateral calves. The right hip and leg were prepped and draped in the usual fashion. Standard anterior approach was made to the hip, down through the skin and subcutaneous tissue. Hemostasis was obtained using Bovie apparatus. Fascia overlying the tensor muscle was incised longitudinally and the muscles were dissected off the fascia, and retracted posterolaterally. Bovie was used to incise the fascia. Circumflex vessels identified, clamped and cauterized. Retractors were placed medially and laterally around the femoral neck. .The capsule was incised longitudinally in a T-shape fashion. The capsule was dissected subperiosteally. Sutures were place at 2 ends of the capsule medially and laterally and used for attraction. Retractors were placed intracapsular. .Femoral neck was cut at the appropriate level. The femoral head was removed using corkscrew. Acetabular retractors were placed. The acetabulum was reamed up to a size 51 reamer. A size 52 acetabular component was impacted into the acetabulum in the appropriate amount of anteversion and horizontal inclination. A single screw was placed superiorly in the ilium with good fixation. Fluoroscopy was used to confirm placement of the cup and screw. This was irrigated, A 36 mm inner diameter polyethylene liner was impacted into the acetabular component. We then turned our attention to the femur. Capsular releases were performed. The leg was brought into hyperextension, and retractors placed, exposing the proximal femur. Canal finder was used to establish the canal. The femoral canal was reamed w/ the tapered reamers up to a size of 5. Canal was broached up to a size 5 broach. Trial reduction was performed, noting excellent range of motion and stability. Fluoroscopy was used to confirm size of the implant and leg lengths. Trials were removed. Canal was cleansed using pulsatile lavage system with antibiotic solution. A size 5 std offset stem was impacted down the intramedullary canal with good fixation. Again, trial reduction was performed, and the 1.5 mm head was chosen. Leg lengths were measured. The trial head was removed, and a 1.5 mm ceramic head was impacted onto the stem. The hip reduced. The hip was stable at 90 degrees of external rotation. Fluoroscopy was used to confirm placement of all hardware, and to measure leg length using a horizontal line parallel to bilateral ischium, looking at the level of the lesser trochanters. Joint was extensively irrigated with antibiotic solution. Capsular repair was performed using #2 Vicryl interrupted figure-of-eight fashion. Capsule was infiltrated with 30 mL of 0.5% Marcaine with epinephrine. Fascia over the tensor muscle was repaired using #2 Stratafix quilled suture in a running fashion. The subcutaneous tissue was approximated using 2-0 Vicryl in interrupted fashion. The skin edges were approximated using 3-0 Monocryl in running subcuticular fashion. Dermabond was applied. A bulky, sterile dressing was applied. The patient was transferred to the recovery room in stable condition. There were no complications.     SPECIMEN:* No specimens in log *    Haven Diaz MD  10/30/2017  4:55 PM

## 2017-10-30 NOTE — BRIEF OP NOTE
BRIEF OPERATIVE NOTE    Date of Procedure: 10/30/2017   Preoperative Diagnosis: DJD RIGHT HIP  Postoperative Diagnosis: DJD RIGHT HIP    Procedure(s):  RIGHT TOTAL HIP REPLACEMENT ANTERIOR APPROACH   Surgeon(s) and Role:     * Cherlyn Apley, MD - Primary         Assistant Staff:  Physician Assistant: Kenzie Orta PA-C    Surgical Staff:  Circ-1: Lindy Santamaria  Circ-Relief: Sandro Ramos RN; Ephraim Alfonso RN  Physician Assistant: Kenzie Orta PA-C  Scrub RN-1: Barbra Love RN  Surg Asst-1: Odalys Hany  Event Time In   Incision Start 1532   Incision Close      Anesthesia: General   Estimated Blood Loss: 150cc  Specimens: * No specimens in log *   Findings: DJD   Complications: none  Implants:   Implant Name Type Inv.  Item Serial No.  Lot No. LRB No. Used Action   STEM FEM 12/14 TAPR STD SZ 5 -- [place] - SN/A  STEM FEM 12/14 TAPR STD SZ 5 -- [place] N/A [employer] [id number] Right 1 Implanted   CUP ACET SECTOR GRIPTION 52MM -- TI - SN/A  CUP ACET SECTOR GRIPTION 52MM -- TI N/A [employer] [id number] Right 1 Implanted   HEAD FEM 36MM +1.5MM NK -- BIOLOX DELTA - SN/A  HEAD FEM 36MM +1.5MM NK -- BIOLOX DELTA N/A [employer] [id number] Right 1 Implanted   LINER ACET PINN NEUT [id number] -- ALTRX - SN/A  LINER ACET PINN NEUT [id number] -- ALTRX N/A [employer] [id number] Right 1 Implanted   SCR ACET CANC PINN 6.5X25MM SS --  - SN/A   SCR ACET CANC PINN 6.5X25MM SS --  N/A [employer] [id number] Right 1 Implanted

## 2017-10-30 NOTE — ANESTHESIA PREPROCEDURE EVALUATION
Anesthetic History   No history of anesthetic complications            Review of Systems / Medical History  Patient summary reviewed, nursing notes reviewed and pertinent labs reviewed    Pulmonary  Within defined limits                 Neuro/Psych              Cardiovascular                  Exercise tolerance: <4 METS     GI/Hepatic/Renal  Within defined limits              Endo/Other    Diabetes: type 2    Arthritis     Other Findings   Comments: [place]         Physical Exam    Airway  Mallampati: II  TM Distance: > 6 cm  Neck ROM: normal range of motion   Mouth opening: Normal     Cardiovascular  Regular rate and rhythm,  S1 and S2 normal,  no murmur, click, rub, or gallop             Dental  No notable dental hx       Pulmonary  Breath sounds clear to auscultation               Abdominal  GI exam deferred       Other Findings            Anesthetic Plan    ASA: 2  Anesthesia type: general          Induction: Intravenous  Anesthetic plan and risks discussed with: Patient

## 2017-10-30 NOTE — PERIOP NOTES
Patient: Alexandra Ramos MRN: [id number]  SSN: [id number]   YOB: 1950  Age: 69 y.o. Sex: male     Patient is status post Procedure(s):  RIGHT TOTAL HIP REPLACEMENT ANTERIOR APPROACH .     Surgeon(s) and Role:     * Mariama Victor MD - Primary    Local/Dose/Irrigation:  30ml 0.25% marcaine w/ epi                  Peripheral IV 10/30/17 Left Forearm (Active)            Airway - Endotracheal Tube Oral (Active)                   Dressing/Packing:  Wound Hip Right-DRESSING TYPE: Topical skin adhesive/glue;Silver products (10/30/17 1500)  Splint/Cast:  ]    Other:  Vargas

## 2017-10-30 NOTE — PROGRESS NOTES
TRANSFER - IN REPORT:    Verbal report received from [address] (name) on Tyler Perches  being received from PACU (unit) for routine post - op      Report consisted of patients Situation, Background, Assessment and   Recommendations(SBAR). Information from the following report(s) SBAR was reviewed with the receiving nurse. Opportunity for questions and clarification was provided. Assessment completed upon patients arrival to unit and care assumed.

## 2017-10-30 NOTE — IP AVS SNAPSHOT
[address] 
[phone] Patient: Marlin Dias MRN: [id number] :1950 My Medications STOP taking these medications   
 traMADol 50 mg tablet Commonly known as:  ULTRAM  
   
  
  
TAKE these medications as instructed Instructions Each Dose to Equal  
 Morning Noon Evening Bedtime  
 acetaminophen 500 mg tablet Commonly known as:  TYLENOL Your last dose was: Your next dose is: Take 2 Tabs by mouth every six (6) hours. 1000 mg  
    
   
   
   
  
 BELSOMRA 20 mg tablet Generic drug:  suvorexant Your last dose was: Your next dose is: Take  by mouth nightly as needed for Insomnia. butalbital-acetaminophen-caffeine -40 mg per tablet Commonly known as:  Kelly Bigger Your last dose was: Your next dose is: Take 1 Tab by mouth every six (6) hours as needed for Pain. 1 Tab  
    
   
   
   
  
 diazePAM 5 mg tablet Commonly known as:  VALIUM Your last dose was: Your next dose is: Take 5 mg by mouth as needed for Anxiety. 5 mg  
    
   
   
   
  
 escitalopram oxalate 20 mg tablet Commonly known as:  Roberto Gresham Your last dose was: Your next dose is: Take 20 mg by mouth daily (with breakfast). 20 mg  
    
   
   
   
  
 glimepiride 4 mg tablet Commonly known as:  AMARYL Your last dose was: Your next dose is: Take  by mouth two (2) times a day. TAKES 1/2=2 MG IN AM & IN PM  
     
   
   
   
  
 GLUCOPHAGE 500 mg tablet Generic drug:  metFORMIN Your last dose was: Your next dose is: Take 500 mg by mouth two (2) times daily (with meals). 500 mg  
    
   
   
   
  
 irbesartan 150 mg tablet Commonly known as:  AVAPRO Your last dose was: Your next dose is: Take 150 mg by mouth daily. IN AM  
 150 mg  
    
   
   
   
  
 meloxicam 15 mg tablet Commonly known as:  MOBIC Start taking on:  11/1/2017 Your last dose was: Your next dose is: Take 1 Tab by mouth daily. 15 mg  
    
   
   
   
  
 oxyCODONE IR 5 mg immediate release tablet Commonly known as:  Shae Woods Your last dose was: Your next dose is: Take 1 Tab by mouth every four (4) hours as needed. Max Daily Amount: 30 mg.  
 5 mg  
    
   
   
   
  
 rivaroxaban 10 mg tablet Commonly known as:  Sj Diehl Start taking on:  11/1/2017 Your last dose was: Your next dose is: Take 1 Tab by mouth daily (with lunch). Indications: HIP SURGERY DEEP VEIN THROMBOSIS PREVENTION  
 10 mg  
    
   
   
   
  
 simvastatin 40 mg tablet Commonly known as:  ZOCOR Your last dose was: Your next dose is: Take  by mouth nightly. VICTOZA 2-CELESTINE 0.6 mg/0.1 mL (18 mg/3 mL) Pnij Generic drug:  Liraglutide Your last dose was: Your next dose is: 0.6 mg by SubCUTAneous route. TAKES 1.2 ML IN AM WHEN HE WAKES UP.  
 0.6 mg Where to Get Your Medications Information on where to get these meds will be given to you by the nurse or doctor. ! Ask your nurse or doctor about these medications  
  acetaminophen 500 mg tablet  
 meloxicam 15 mg tablet  
 oxyCODONE IR 5 mg immediate release tablet  
 rivaroxaban 10 mg tablet

## 2017-10-30 NOTE — IP AVS SNAPSHOT
2700 [facility] [address] [place] [address] 
[phone] Patient: Meghann Ohara MRN: [id number] :1950 About your hospitalization You were [facility] on:  2017 [facility]  [facility] Why you were hospitalized Your primary diagnosis was:  Primary Osteoarthritis Of Right Hip Things You Need To Do (next 8 weeks) Follow up with Trudy Lieberman MD  
  
Phone:  [phone] Where:  [id number]  [facility], 1000 [facility], [address] [place] [address] Discharge Orders None A check ramos indicates which time of day the medication should be taken. My Medications STOP taking these medications   
 traMADol 50 mg tablet Commonly known as:  ULTRAM  
   
  
  
TAKE these medications as instructed Instructions Each Dose to Equal  
 Morning Noon Evening Bedtime  
 acetaminophen 500 mg tablet Commonly known as:  TYLENOL Your last dose was: Your next dose is: Take 2 Tabs by mouth every six (6) hours. 1000 mg  
    
   
   
   
  
 BELSOMRA 20 mg tablet Generic drug:  suvorexant Your last dose was: Your next dose is: Take  by mouth nightly as needed for Insomnia. butalbital-acetaminophen-caffeine -40 mg per tablet Commonly known as:  Francois Loaiza Your last dose was: Your next dose is: Take 1 Tab by mouth every six (6) hours as needed for Pain. 1 Tab  
    
   
   
   
  
 diazePAM 5 mg tablet Commonly known as:  VALIUM Your last dose was: Your next dose is: Take 5 mg by mouth as needed for Anxiety. 5 mg  
    
   
   
   
  
 escitalopram oxalate 20 mg tablet Commonly known as:  Ashkan Simon Your last dose was: Your next dose is: Take 20 mg by mouth daily (with breakfast). 20 mg  
    
   
   
   
  
 glimepiride 4 mg tablet Commonly known as:  AMARYL Your last dose was: Your next dose is: Take  by mouth two (2) times a day. TAKES 1/2=2 MG IN AM & IN PM  
     
   
   
   
  
 GLUCOPHAGE 500 mg tablet Generic drug:  metFORMIN Your last dose was: Your next dose is: Take 500 mg by mouth two (2) times daily (with meals). 500 mg  
    
   
   
   
  
 irbesartan 150 mg tablet Commonly known as:  AVAPRO Your last dose was: Your next dose is: Take 150 mg by mouth daily. IN AM  
 150 mg  
    
   
   
   
  
 meloxicam 15 mg tablet Commonly known as:  MOBIC Start taking on:  11/1/2017 Your last dose was: Your next dose is: Take 1 Tab by mouth daily. 15 mg  
    
   
   
   
  
 oxyCODONE IR 5 mg immediate release tablet Commonly known as:  Warren Figueroa Your last dose was: Your next dose is: Take 1 Tab by mouth every four (4) hours as needed. Max Daily Amount: 30 mg.  
 5 mg  
    
   
   
   
  
 rivaroxaban 10 mg tablet Commonly known as:  Larisa Lancaster Start taking on:  11/1/2017 Your last dose was: Your next dose is: Take 1 Tab by mouth daily (with lunch). Indications: HIP SURGERY DEEP VEIN THROMBOSIS PREVENTION  
 10 mg  
    
   
   
   
  
 simvastatin 40 mg tablet Commonly known as:  ZOCOR Your last dose was: Your next dose is: Take  by mouth nightly. VICTOZA 2-CELESTINE 0.6 mg/0.1 mL (18 mg/3 mL) Pnij Generic drug:  Liraglutide Your last dose was: Your next dose is: 0.6 mg by SubCUTAneous route. TAKES 1.2 ML IN AM WHEN HE WAKES UP.  
 0.6 mg Where to Get Your Medications Information on where to get these meds will be given to you by the nurse or doctor. ! Ask your nurse or doctor about these medications  
  acetaminophen 500 mg tablet  
 meloxicam 15 mg tablet  
 oxyCODONE IR 5 mg immediate release tablet  
 rivaroxaban 10 mg tablet Discharge Instructions Patient meets criteria for BUNDLED PAYMENT  
for Care Improvement Initiative Criteria Contact Information for Orthopedic Nurse Navigator:     
BAYRON GambleN, RN-BC 
O:[phone] U:[phone] Y:[phone] After Hospital Care Plan/Discharge Instructions Anterior Approach Hip Replacement- Dr. Summer Vazquez Patient Name  Sadie Kraus Date of procedure  10/30/2017 Procedure  Procedure(s): RIGHT TOTAL HIP REPLACEMENT ANTERIOR APPROACH Surgeon  Surgeon(s) and Role: Michele Serrano MD - Primary PCP: Asif Palma MD 
Date of discharge: No discharge date for patient encounter. Follow up appointments 
-follow up with Dr. Summer Vazquez in 2 weeks. Call [phone] to make an appointment. [facility] Agency: _________________________   phone: ___________________ The agency will contact you to arrange dates/times for visits. Please call them if you do not hear from them within 24 hours after you are discharged When to call your Orthopaedic Surgeon: 
-Signs of hip dislocation-increased hip pain, unrelieved pain or if you have difficulty or are unable to walk 
-Signs of infection-if your incision is red; continues to have drainage; drainage has a foul odor or if you have a persistent fever over 101 degrees 
-Signs of a blood clot in your leg-calf pain, tenderness, redness, swelling of lower leg When to call your Primary Care Physician: 
-Concerns about medical conditions such as diabetes, high blood pressure, asthma, congestive heart failure 
-Call if blood sugars are elevated, persistent headache or dizziness, coughing or congestion, constipation or diarrhea, burning with urination, abnormal heart rate When to call 911and go to the nearest emergency room 
-acute onset of chest pain, shortness of breath, difficulty breathing Activity 
- weight bearing as tolerated with a walker or crutches; discontinue walker or crutches only when instructed by the therapist.  Refer to pages 26-36 of your handbook for instructions and pictures. 
-20 repetitions of each exercise as instructed by therapist 3 times each day. Refer to pages 38-45 of your handbook for instructions and pictures. 
-get up every one hour and walk (except at night when sleeping) -Avoid extreme movement of hip to prevent dislocation 
-Do not drive or operate heavy machinery Incision Care 
-the Aquacel (brown, waterproof) surgical dressing is to remain on your hip for 7 days. On the 7th day have someone gently peel the dressing off by carefully lifting the edge and stretching it slightly to break the adhesive seal 
-You may now leave your incision open to air. You will have absorbable sutures in your incision 
-If your Aquacel dressing comes loose/off before the 7th day, you may replace it with a dry sterile gauze dressing; change it daily. Once your incision is not draining, you may leave it open to air 
-You may take a shower with the Aquacel dressing in place. Once the Aquacel is removed, you may shower and get your incision wet but do not submerge your incision under water in a bath tub, hot tub or swimming pool for 6 weeks after surgery. Preventing blood clots 
-take Xarelto at 12 noon daily as prescribed by Dr. Shirley Siddiqi Pain management 
-take pain medication as prescribed; decrease the amount you use as your pain lessens 
-avoid alcoholic beverages while taking pain medication 
-Please be aware that many medications contain Tylenol. We do not want you to over medicate so please read the information below as a guide. Do not take more than 4 Grams of Tylenol in a 24 hour period. (There are 1000 milligrams in one Gram) -You may place an ice bag on your hip for 15-20 minutes after exercising and as needed though out the day and night Diet 
-resume usual diet; drink plenty of fluids; eat foods high in fiber 
-you may want to take a stool softener (such as Senokot-S or Colace) to prevent constipation while you are taking pain medication. If constipation occurs, take a laxative (such as Dulcolax tablets, Milk of Magnesia, or a suppository) Home Health Care Protocol (to be followed by [address]) Nursing-per physicians order 
-complete head to toe assessment, vital signs 
-medication reconciliation 
-review pain management 
-manage chronic medical conditions Physical Therapy-per physicians order Weight bearing status: 
  
  
Right Side Weight Bearing: (P) As tolerated Mobility Status: 
  
  
  
  
Gait: 
Distance (ft): (P) 100 Feet (ft) Ambulation - Level of Assistance: (P) Contact guard assistance (1 instance of LOB with standing therex review) Assistive Device: (P) Gait belt, Walker, rolling Gait Abnormalities: (P) Antalgic, Decreased step clearance, Step to gait ADL status overall composite: 
  
  
  
  
 
-HOME THERAPY 
-assessment and evaluation-bed mobility; functional transfers (bed, chair, bathroom, stairs); ambulation with equipment, car transfers,shower transfers, safety and ability to get out of house in the event of an emergency 
-review and maintain weight bearing as tolerated; avoid extreme movement of hip to prevent hip dislocation 
-discuss pain management 
-review how to do ADLs. Refer to pages 46-50 of handbook 
 
-Home Exercise Program-refer to pages 38-45 of handbook 
-supine exercises-quad sets, hamstring sets, gluteal sets, hip abduction/adduction to neutral, hip external rotation, heel slides (flexing the knee) -sitting exercises-ankle pumps, bicep curls (use weights as appropriate), triceps pushups, shoulder flexion (use weights as appropriate) -standing exercises holding onto supportive counter-toe raises, heel raises, mini-squats, heel/toe touches with knee bend, marching in place, hip abduction/adduction, hip external rotation, hip extension, hip abduction/extension/external rotation (concentration on gluteus crista), heel toe taps [employer] Announcement We are excited to announce that we are making your provider's discharge notes available to you in [employer]. You will see these notes when they are completed and signed by the physician that discharged you from your recent hospital stay. If you have any questions or concerns about any information you see in [employer], please call the Health Information Department where you were seen or reach out to your Primary Care Provider for more information about your plan of care. Introducing \[facility] [place] [facility]\"" & HEALTH SERVICES! Dear Alec Powell: 
Thank you for requesting a [employer] account. Our records indicate that you already have an active [employer] account. You can access your account anytime at [email or web address] Did you know that you can access your hospital and ER discharge instructions at any time in [employer]? You can also review all of your test results from your hospital stay or ER visit. Additional Information If you have questions, please visit the Frequently Asked Questions section of the [employer] website at [email or web address] Remember, [employer] is NOT to be used for urgent needs. For medical emergencies, dial 911. Now available from your iPhone and Android! Providers Seen During Your Hospitalization Provider Specialty Primary office phone Ashok Angel MD Orthopedic Surgery [phone] Your Primary Care Physician (PCP) Primary Care Physician Office Phone Office Fax Clara Burns [phone] You are allergic to the following Allergen Reactions Ddavp (Desmopressin) Other (comments) KIDNEY ISSUES. Recent Documentation Height Weight BMI Smoking Status 1.753 m 102.1 kg 33.23 kg/m2 Never Smoker Emergency Contacts Name Discharge Info Relation Home Work Mobile Nate Gill CAREGIVER [3] Life Partner [7] [phone] Patient Belongings The following personal items are in your possession at time of discharge: 
  Dental Appliances: None  Visual Aid: Glasses             Clothing:  (bag of clothes returned to pt in pacu) Please provide this summary of care documentation to your next provider. Signatures-by signing, you are acknowledging that this After Visit Summary has been reviewed with you and you have received a copy. Patient Signature:  ____________________________________________________________ Date:  ____________________________________________________________  
  
Deuce Felix Provider Signature:  ____________________________________________________________ Date:  ____________________________________________________________

## 2017-10-30 NOTE — PERIOP NOTES
TRANSFER - OUT REPORT:    Verbal report given to RN on Johanna Speed  being transferred to [facility] 57 23 09 for routine post - op       Report consisted of patients Situation, Background, Assessment and   Recommendations(SBAR). Time Pre op antibiotic given:1517  Anesthesia Stop time: 5646  Vargas Present on Transfer to floor:yes  Order for Vargas on Chart:yes    Information from the following report(s) SBAR, OR Summary, Intake/Output, MAR and Recent Results was reviewed with the receiving nurse. Opportunity for questions and clarification was provided. Is the patient on 02? YES       L/Min 2       Other 0    Is the patient on a monitor? NO    Is the nurse transporting with the patient? NO    Surgical Waiting Area notified of patient's transfer from PACU?  YES      The following personal items collected during your admission accompanied patient upon transfer:   Dental Appliance: Dental Appliances: None  Vision:    Hearing Aid:    Jewelry:    Clothing: Clothing:  (bag of clothes returned to pt in pacu)  Other Valuables:    Valuables sent to safe:

## 2017-10-31 VITALS
OXYGEN SATURATION: 95 % | HEART RATE: 88 BPM | BODY MASS INDEX: 33.33 KG/M2 | RESPIRATION RATE: 16 BRPM | HEIGHT: 69 IN | SYSTOLIC BLOOD PRESSURE: 133 MMHG | TEMPERATURE: 98.8 F | WEIGHT: 225 LBS | DIASTOLIC BLOOD PRESSURE: 78 MMHG

## 2017-10-31 LAB
ANION GAP SERPL CALC-SCNC: 9 MMOL/L (ref 5–15)
BUN SERPL-MCNC: 18 MG/DL (ref 6–20)
BUN/CREAT SERPL: 16 (ref 12–20)
CALCIUM SERPL-MCNC: 8 MG/DL (ref 8.5–10.1)
CHLORIDE SERPL-SCNC: 101 MMOL/L (ref 97–108)
CO2 SERPL-SCNC: 23 MMOL/L (ref 21–32)
CREAT SERPL-MCNC: 1.16 MG/DL (ref 0.7–1.3)
GLUCOSE BLD STRIP.AUTO-MCNC: 283 MG/DL (ref 65–100)
GLUCOSE BLD STRIP.AUTO-MCNC: 327 MG/DL (ref 65–100)
GLUCOSE SERPL-MCNC: 288 MG/DL (ref 65–100)
HGB BLD-MCNC: 12.4 G/DL (ref 12.1–17)
POTASSIUM SERPL-SCNC: 5.1 MMOL/L (ref 3.5–5.1)
SERVICE CMNT-IMP: ABNORMAL
SERVICE CMNT-IMP: ABNORMAL
SODIUM SERPL-SCNC: 133 MMOL/L (ref 136–145)

## 2017-10-31 PROCEDURE — 74011250636 HC RX REV CODE- 250/636: Performed by: PHYSICIAN ASSISTANT

## 2017-10-31 PROCEDURE — 97116 GAIT TRAINING THERAPY: CPT

## 2017-10-31 PROCEDURE — 85018 HEMOGLOBIN: CPT | Performed by: PHYSICIAN ASSISTANT

## 2017-10-31 PROCEDURE — G8988 SELF CARE GOAL STATUS: HCPCS

## 2017-10-31 PROCEDURE — 97165 OT EVAL LOW COMPLEX 30 MIN: CPT

## 2017-10-31 PROCEDURE — 74011636637 HC RX REV CODE- 636/637: Performed by: PHYSICIAN ASSISTANT

## 2017-10-31 PROCEDURE — 82962 GLUCOSE BLOOD TEST: CPT

## 2017-10-31 PROCEDURE — 97535 SELF CARE MNGMENT TRAINING: CPT

## 2017-10-31 PROCEDURE — 74011250637 HC RX REV CODE- 250/637: Performed by: PHYSICIAN ASSISTANT

## 2017-10-31 PROCEDURE — G8987 SELF CARE CURRENT STATUS: HCPCS

## 2017-10-31 PROCEDURE — G8989 SELF CARE D/C STATUS: HCPCS

## 2017-10-31 PROCEDURE — 36415 COLL VENOUS BLD VENIPUNCTURE: CPT | Performed by: PHYSICIAN ASSISTANT

## 2017-10-31 PROCEDURE — 97161 PT EVAL LOW COMPLEX 20 MIN: CPT

## 2017-10-31 PROCEDURE — 80048 BASIC METABOLIC PNL TOTAL CA: CPT | Performed by: PHYSICIAN ASSISTANT

## 2017-10-31 PROCEDURE — 97110 THERAPEUTIC EXERCISES: CPT

## 2017-10-31 RX ORDER — MELOXICAM 15 MG/1
15 TABLET ORAL DAILY
Qty: 29 TAB | Refills: 0 | Status: SHIPPED | OUTPATIENT
Start: 2017-11-01 | End: 2019-07-01

## 2017-10-31 RX ORDER — OXYCODONE HYDROCHLORIDE 5 MG/1
5 TABLET ORAL
Qty: 40 TAB | Refills: 0 | Status: SHIPPED | OUTPATIENT
Start: 2017-10-31 | End: 2019-05-24 | Stop reason: SDUPTHER

## 2017-10-31 RX ORDER — ACETAMINOPHEN 500 MG
1000 TABLET ORAL EVERY 6 HOURS
Qty: 120 TAB | Refills: 0 | Status: SHIPPED | OUTPATIENT
Start: 2017-10-31 | End: 2019-05-24 | Stop reason: SDUPTHER

## 2017-10-31 RX ADMIN — RIVAROXABAN 10 MG: 10 TABLET, FILM COATED ORAL at 01:04

## 2017-10-31 RX ADMIN — INSULIN LISPRO 5 UNITS: 100 INJECTION, SOLUTION INTRAVENOUS; SUBCUTANEOUS at 07:54

## 2017-10-31 RX ADMIN — INSULIN LISPRO 7 UNITS: 100 INJECTION, SOLUTION INTRAVENOUS; SUBCUTANEOUS at 12:35

## 2017-10-31 RX ADMIN — LOSARTAN POTASSIUM 50 MG: 50 TABLET ORAL at 09:04

## 2017-10-31 RX ADMIN — STANDARDIZED SENNA CONCENTRATE AND DOCUSATE SODIUM 1 TABLET: 8.6; 5 TABLET, FILM COATED ORAL at 09:05

## 2017-10-31 RX ADMIN — FAMOTIDINE 20 MG: 20 TABLET, FILM COATED ORAL at 09:04

## 2017-10-31 RX ADMIN — ESCITALOPRAM OXALATE 20 MG: 10 TABLET ORAL at 09:03

## 2017-10-31 RX ADMIN — ACETAMINOPHEN 1000 MG: 500 TABLET, FILM COATED ORAL at 13:27

## 2017-10-31 RX ADMIN — POLYETHYLENE GLYCOL 3350 17 G: 17 POWDER, FOR SOLUTION ORAL at 09:06

## 2017-10-31 RX ADMIN — CEFAZOLIN 2 G: 1 INJECTION, POWDER, FOR SOLUTION INTRAMUSCULAR; INTRAVENOUS; PARENTERAL at 07:24

## 2017-10-31 RX ADMIN — OXYCODONE HYDROCHLORIDE 7.5 MG: 5 TABLET ORAL at 13:35

## 2017-10-31 RX ADMIN — OXYCODONE HYDROCHLORIDE 7.5 MG: 5 TABLET ORAL at 04:48

## 2017-10-31 RX ADMIN — MELOXICAM 15 MG: 7.5 TABLET ORAL at 09:05

## 2017-10-31 RX ADMIN — DIAZEPAM 5 MG: 2 TABLET ORAL at 01:04

## 2017-10-31 RX ADMIN — ACETAMINOPHEN 1000 MG: 500 TABLET, FILM COATED ORAL at 07:24

## 2017-10-31 RX ADMIN — ACETAMINOPHEN 1000 MG: 500 TABLET, FILM COATED ORAL at 01:04

## 2017-10-31 NOTE — PROGRESS NOTES
Problem: Falls - Risk of  Goal: *Absence of Falls  Document Caleb Fall Risk and appropriate interventions in the flowsheet.    Outcome: Progressing Towards Goal  Fall Risk Interventions:  Mobility Interventions: Patient to call before getting OOB         Medication Interventions: Patient to call before getting OOB    Elimination Interventions: Call light in reach

## 2017-10-31 NOTE — ANESTHESIA POSTPROCEDURE EVALUATION
Post-Anesthesia Evaluation and Assessment    Patient: Luzma Gonzalez MRN: [id number]  SSN: [id number]    YOB: 1950  Age: 69 y.o. Sex: male       Cardiovascular Function/Vital Signs  Visit Vitals    /66 (BP 1 Location: Right arm, BP Patient Position: At rest)    Pulse 89    Temp 36.8 °C (98.3 °F)    Resp 16    Ht 5' 9\" (1.753 m)    Wt 102.1 kg (225 lb)    SpO2 97%    BMI 33.23 kg/m2       Patient is status post spinal anesthesia for Procedure(s):  RIGHT TOTAL HIP REPLACEMENT ANTERIOR APPROACH . Nausea/Vomiting: None    Postoperative hydration reviewed and adequate. Pain:  Pain Scale 1: Numeric (0 - 10) (10/30/17 2017)  Pain Intensity 1: 0 (10/30/17 2017)   Managed    Neurological Status:   Neuro (WDL): Within Defined Limits (10/30/17 1758)  Neuro  Neurologic State: Alert (10/30/17 1758)  LUE Motor Response: Purposeful (10/30/17 1758)  LLE Motor Response: Purposeful (10/30/17 1758)  RUE Motor Response: Purposeful (10/30/17 1758)  RLE Motor Response: Purposeful (10/30/17 1758)   At baseline    Mental Status and Level of Consciousness: Arousable    Pulmonary Status:   O2 Device: Nasal cannula (10/30/17 2017)   Adequate oxygenation and airway patent    Complications related to anesthesia: None    Post-anesthesia assessment completed.  No concerns    Signed By: Sadie Mccain MD     October 30, 2017

## 2017-10-31 NOTE — PROGRESS NOTES
Ortho Progress Note  1 Day Post-Op  Procedure(s):  RIGHT TOTAL HIP REPLACEMENT ANTERIOR APPROACH     Subjective: Pt doing extremely well this am, no pain, mild soreness in R thigh. Pt ready to d/c home after working with PT. Pt denies N/V, lightheadedness, dizziness, SOB, or abdominal pain. Physical Exam:   Visit Vitals    /70 (BP 1 Location: Right arm, BP Patient Position: At rest)    Pulse 63    Temp 97.6 °F (36.4 °C)    Resp 16    Ht 5' 9\" (1.753 m)    Wt 102.1 kg (225 lb)    SpO2 100%    BMI 33.23 kg/m2     General appearance: alert, cooperative, no distress, appears stated age  Abdomen: soft, non-tender.  Bowel sounds normal. No masses,  no organomegaly  Extremities: Homans sign is negative, no sign of DVT, good ROM R hip without pain, no significant swelling, no calf pain or swelling  Pulses: 2+ and symmetric  Wound: aquacel dressing c/d/i, no evidence of drainage  Neurologic: Grossly normal, ankle dorsi/plantar flexion intact, heel raise intact    Recent Results (from the past 24 hour(s))   GLUCOSE, POC    Collection Time: 10/30/17 12:55 PM   Result Value Ref Range    Glucose (POC) 187 (H) 65 - 100 mg/dL    Performed by [address] [place] [address]    Collection Time: 10/30/17 12:58 PM   Result Value Ref Range    Crossmatch Expiration 11/02/2017     ABO/Rh(D) Gerilyn Clonts POSITIVE     Antibody screen NEG    GLUCOSE, POC    Collection Time: 10/30/17  5:28 PM   Result Value Ref Range    Glucose (POC) 207 (H) 65 - 100 mg/dL    Performed by Celena Brittle    HEMOGLOBIN A1C WITH EAG    Collection Time: 10/30/17  5:41 PM   Result Value Ref Range    Hemoglobin A1c 7.1 (H) 4.2 - 6.3 %    Est. average glucose 157 mg/dL   GLUCOSE, POC    Collection Time: 10/30/17  9:32 PM   Result Value Ref Range    Glucose (POC) 366 (H) 65 - 100 mg/dL    Performed by CESIA NICKERSON    METABOLIC PANEL, BASIC    Collection Time: 10/31/17  4:47 AM   Result Value Ref Range    Sodium 133 (L) 136 - 145 mmol/L    Potassium 5.1 3.5 - 5.1 mmol/L    Chloride 101 97 - 108 mmol/L    CO2 23 21 - 32 mmol/L    Anion gap 9 5 - 15 mmol/L    Glucose 288 (H) 65 - 100 mg/dL    BUN 18 6 - 20 MG/DL    Creatinine 1.16 0.70 - 1.30 MG/DL    BUN/Creatinine ratio 16 12 - 20      GFR est AA >60 >60 ml/min/1.73m2    GFR est non-AA >60 >60 ml/min/1.73m2    Calcium 8.0 (L) 8.5 - 10.1 MG/DL   HEMOGLOBIN    Collection Time: 10/31/17  4:47 AM   Result Value Ref Range    HGB 12.4 12.1 - 17.0 g/dL   GLUCOSE, POC    Collection Time: 10/31/17  7:22 AM   Result Value Ref Range    Glucose (POC) 283 (H) 65 - 100 mg/dL    Performed by Blake Davis        Assessment:  Stable Post Op    Plan:  DVT Prophylaxis:Xarelto x 30d  Physical Therapy: WBAT  Discharge Planning: home today if cleared by PT                                    F/u in office in 2 weeks  Pain control: tylenol, mobic, oxycodone 7.5mg  Obesity: BMI 33.23, chronic, encourage OOB for all meals, use of IS, good pulmonary toilet

## 2017-10-31 NOTE — ROUTINE PROCESS
Bedside shift change report given to Zamzam (oncoming nurse) by Jory White (offgoing nurse). Report included the following information SBAR.

## 2017-10-31 NOTE — DISCHARGE INSTRUCTIONS
Patient meets criteria for   BUNDLED PAYMENT   for Care Improvement Initiative Criteria    Contact Information for Orthopedic Nurse Navigator:      NIECY Zaidi, RN-BC  [phone]  T:[phone]    After Hospital Care Plan/Discharge Instructions   Anterior Approach Hip Replacement- Dr. Dyllan Slatre    Patient Name  Reyna Sanchez  Date of procedure  10/30/2017    Procedure  Procedure(s):  RIGHT TOTAL HIP REPLACEMENT ANTERIOR APPROACH   Surgeon  Surgeon(s) and Role:     * Georgina Gonzalez MD - Primary  PCP: Wil Calix MD  Date of discharge: No discharge date for patient encounter. Follow up appointments  -follow up with Dr. Dyllan Slater in 2 weeks. Call [phone] to make an appointment. [facility] Agency: _________________________   phone: ___________________  The agency will contact you to arrange dates/times for visits.   Please call them if you do not hear from them within 24 hours after you are discharged      When to call your Orthopaedic Surgeon:  -Signs of hip dislocation-increased hip pain, unrelieved pain or if you have difficulty or are unable to walk  -Signs of infection-if your incision is red; continues to have drainage; drainage has a foul odor or if you have a persistent fever over 101 degrees  -Signs of a blood clot in your leg-calf pain, tenderness, redness, swelling of lower leg  When to call your Primary Care Physician:  -Concerns about medical conditions such as diabetes, high blood pressure, asthma, congestive heart failure  -Call if blood sugars are elevated, persistent headache or dizziness, coughing or congestion, constipation or diarrhea, burning with urination, abnormal heart rate    When to call 372ind go to the nearest emergency room  -acute onset of chest pain, shortness of breath, difficulty breathing    Activity  - weight bearing as tolerated with a walker or crutches; discontinue walker or crutches only when instructed by the therapist.  Refer to pages 26-36 of your handbook for instructions and pictures.  -20 repetitions of each exercise as instructed by therapist 3 times each day. Refer to pages 38-45 of your handbook for instructions and pictures.  -get up every one hour and walk (except at night when sleeping)  -Avoid extreme movement of hip to prevent dislocation  -Do not drive or operate heavy machinery     Incision Care  -the Aquacel (brown, waterproof) surgical dressing is to remain on your hip for 7 days. On the 7th day have someone gently peel the dressing off by carefully lifting the edge and stretching it slightly to break the adhesive seal  -You may now leave your incision open to air. You will have absorbable sutures in your incision  -If your Aquacel dressing comes loose/off before the 7th day, you may replace it with a dry sterile gauze dressing; change it daily. Once your incision is not draining, you may leave it open to air  -You may take a shower with the Aquacel dressing in place. Once the Aquacel is removed, you may shower and get your incision wet but do not submerge your incision under water in a bath tub, hot tub or swimming pool for 6 weeks after surgery. Preventing blood clots  -take Xarelto at 12 noon daily as prescribed by Dr. Zora Thorpe    Pain management  -take pain medication as prescribed; decrease the amount you use as your pain lessens  -avoid alcoholic beverages while taking pain medication  -Please be aware that many medications contain Tylenol. We do not want you to over medicate so please read the information below as a guide. Do not take more than 4 Grams of Tylenol in a 24 hour period.   (There are 1000 milligrams in one Gram)    -You may place an ice bag on your hip for 15-20 minutes after exercising and as needed though out the day and night    Diet  -resume usual diet; drink plenty of fluids; eat foods high in fiber  -you may want to take a stool softener (such as Senokot-S or Colace) to prevent constipation while you are taking pain medication. If constipation occurs, take a laxative (such as Dulcolax tablets, Milk of Magnesia, or a suppository)    [facility] (to be followed by [address])    Nursing-per physicians order  -complete head to toe assessment, vital signs  -medication reconciliation  -review pain management  -manage chronic medical conditions    Physical Therapy-per physicians order  Weight bearing status:        Right Side Weight Bearing: (P) As tolerated      Mobility Status:              Gait:  Distance (ft): (P) 100 Feet (ft)  Ambulation - Level of Assistance: (P) Contact guard assistance (1 instance of LOB with standing therex review)  Assistive Device: (P) Gait belt, Walker, rolling  Gait Abnormalities: (P) Antalgic, Decreased step clearance, Step to gait  ADL status overall composite:                -HOME THERAPY  -assessment and evaluation-bed mobility; functional transfers (bed, chair, bathroom, stairs); ambulation with equipment, car transfers,shower transfers, safety and ability to get out of house in the event of an emergency  -review and maintain weight bearing as tolerated; avoid extreme movement of hip to prevent hip dislocation  -discuss pain management  -review how to do ADLs.   Refer to pages 46-50 of handbook    -Home Exercise Program-refer to pages 38-45 of handbook  -supine exercises-quad sets, hamstring sets, gluteal sets, hip abduction/adduction to neutral, hip external rotation, heel slides (flexing the knee)  -sitting exercises-ankle pumps, bicep curls (use weights as appropriate), triceps pushups, shoulder flexion (use weights as appropriate)  -standing exercises holding onto supportive counter-toe raises, heel raises, mini-squats, heel/toe touches with knee bend, marching in place, hip abduction/adduction, hip external rotation, hip extension, hip abduction/extension/external rotation (concentration on gluteus crista), heel toe taps

## 2017-10-31 NOTE — PROGRESS NOTES
Problem: Mobility Impaired (Adult and Pediatric)  Goal: *Acute Goals and Plan of Care (Insert Text)  Physical Therapy Goals  Initiated 10/31/2017    1. Patient will move from supine to sit and sit to supine  in bed with independence within 4 days. 2. Patient will perform sit to stand with modified independence within 4 days. 3. Patient will ambulate with modified independence for 150 feet with the least restrictive device within 4 days. 4. Patient will ascend/descend 1 stairs with 0 handrail(s) with supervision/set-up within 4 days. 5. Patient will perform home exercise program per protocol with independence within 4 days. physical Therapy EVALUATION  Patient: Daisy Curran (38 y.o. male)  Date: 10/31/2017  Primary Diagnosis: DJD RIGHT HIP  Primary osteoarthritis of right hip  Primary osteoarthritis of right hip  Procedure(s) (LRB):  RIGHT TOTAL HIP REPLACEMENT ANTERIOR APPROACH  (Right) 1 Day Post-Op   Precautions:  WBAT, Fall    ASSESSMENT :  Based on the objective data described below, the patient presents with generally decreased strength, balance, ROM, and increased pain on RLE s/p DAYNA. Pt is very talkative and slightly dismissive of education, stating \"I know, I know that\" quite often. Pt overall agreeable to complete therex, bed mobility, transfers, gait, and stair training with assist x1. Difficulty noted with stand to sit (uncontrolled descent) from RW. Pt spoke at length about his prior R quad pain and having been seeing a deep tissue masseuse for some time. Pt also discussed R multiple toe sx. Pt did well with all tasks and is technically cleared for home from PT standpoint though pt likely not to depart before afternoon. Will plan to f/u for one additional BID session. Pt plans to return home without HHPT services and he has DME needs met already (deferred RW order, will use wife's which is in excellent condition). Pt in chair in NAD when left and requesting to get dressed, RN aware. Of note: pt states he will commence work 3 hrs per day Thursday. Patient will benefit from skilled intervention to address the above impairments. Patients rehabilitation potential is considered to be Fair  Factors which may influence rehabilitation potential include:   []         None noted  []         Mental ability/status  []         Medical condition  []         Home/family situation and support systems  [x]         Safety awareness  []         Pain tolerance/management  []         Other:      PLAN :  Recommendations and Planned Interventions:  [x]           Bed Mobility Training             []    Neuromuscular Re-Education  [x]           Transfer Training                   []    Orthotic/Prosthetic Training  [x]           Gait Training                         []    Modalities  [x]           Therapeutic Exercises           []    Edema Management/Control  [x]           Therapeutic Activities            [x]    Patient and Family Training/Education  []           Other (comment):    Frequency/Duration: Patient will be followed by physical therapy  twice daily to address goals. Discharge Recommendations: None-NP and pt determined no needs, pt could benefit but not required for return home  Further Equipment Recommendations for Discharge: NA     SUBJECTIVE:   Patient stated I am going back to work Thursday. .. 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 ..    OBJECTIVE DATA SUMMARY:   HISTORY:    Past Medical History:   Diagnosis Date    Cancer ([facility] [address]     Prostate    Diabetes ([facility] [address]     Ill-defined condition     ISSUES WITH CLOTTING FACTOR VIII     Past Surgical History:   Procedure Laterality Date    ABDOMEN SURGERY PROC UNLISTED  1954    RIGHT CONGENITAL INGUINAL HERNIA REPAIR    ABDOMEN SURGERY PROC UNLISTED  1991    LEFT INGUINAL HERNIA REPAIR    ABDOMEN SURGERY [address] UNLISTED      SIGMOIDOSCOPY-FOR HEMMORIODS.    HX APPENDECTOMY      HX GI  2017    COLONOSCOPY WITH NO POLYPS    HX HEENT  1949 T&A    HX HEENT  1972    EXTRACTION OF WISDOM TEETH X 4 (2 SURGERIES 2 TEETH AT A TIME)    HX ORTHOPAEDIC  02/2006    RIGHT HAMMER TOE FIXED    HX OTHER SURGICAL  1963    RIGHT INNER THIGH CEBACIOUS CYST REMOVED    HX PROSTATECTOMY  06/2012    RADICAL PROSTATECTOMY     Prior Level of Function/Home Situation: indep and no DME used prior to sx  Personal factors and/or comorbidities impacting plan of care: pt not super receptive to education; supportive wife at home per pt report     Home Situation  Home Environment: Private residence  # Steps to Enter: 1  Wheelchair Ramp: No  One/Two Story Residence: One story  Living Alone: No  Support Systems: Spouse/Significant Other/Partner  Patient Expects to be Discharged to[de-identified] Private residence  Current DME Used/Available at Home: Derrell Smith, shaheen Johansen, Olvin, bedside    EXAMINATION/PRESENTATION/DECISION MAKING:   Critical Behavior:  Neurologic State: Alert  Orientation Level: Appropriate for age  Cognition: Follows commands  Safety/Judgement: Fall prevention  Hearing: Auditory  Auditory Impairment: None  Range Of Motion:  AROM: Within functional limits (RLE generall decreased)                       Strength:    Strength: Within functional limits (RLE generally decreased)                    Tone & Sensation:   Tone: Normal                              Coordination:  Coordination: Within functional limits (RLE generally decreased)  Functional Mobility:  Bed Mobility:     Supine to Sit: Supervision; Additional time (cues for completion out right side)  Sit to Supine:  (NT; OOB to chair)  Scooting: Supervision  Transfers:  Sit to Stand: Contact guard assistance (cues to push from support surface)  Stand to Sit: Minimum assistance; Additional time (pt with slightly decreased control during descent, cues for )                       Balance:   Sitting: Intact  Standing: Impaired; With support  Standing - Static: Fair  Standing - Dynamic : Fair  Ambulation/Gait Training:  Distance (ft): 100 Feet (ft)  Assistive Device: Gait belt;Walker, rolling  Ambulation - Level of Assistance: Contact guard assistance (1 instance of LOB with standing therex review)     Gait Description (WDL): Exceptions to WDL  Gait Abnormalities: Antalgic;Decreased step clearance; Step to gait  Right Side Weight Bearing: As tolerated        Stance: Right decreased  Speed/Estrellita: Slow        Stairs:  Number of Stairs Trained: 2  Stairs - Level of Assistance: Contact guard assistance; Additional time   Rail Use:  (none-platform steps with walker)    Therapeutic Exercises:   Supine: heel slides, APs, HS sets, quad sets, hip abd x10 reps    Functional Measure:  Tinetti test:    Sitting Balance: 1  Arises: 1  Attempts to Rise: 2  Immediate Standing Balance: 1  Standing Balance: 1  Nudged: 0  Eyes Closed: 0  Turn 360 Degrees - Continuous/Discontinuous: 0  Turn 360 Degrees - Steady/Unsteady: 0  Sitting Down: 1  Balance Score: 7  Indication of Gait: 1  R Step Length/Height: 0  L Step Length/Height: 0  R Foot Clearance: 1  L Foot Clearance: 1  Step Symmetry: 0  Step Continuity: 0  Path: 1  Trunk: 0  Walking Time: 0  Gait Score: 4  Total Score: 11       Tinetti Test and G-code impairment scale:  Percentage of Impairment CH    0%   CI    1-19% CJ    20-39% CK    40-59% CL    60-79% CM    80-99% CN     100%   Tinetti  Score 0-28 28 23-27 17-22 12-16 6-11 1-5 0       Tinetti Tool Score Risk of Falls  <19 = High Fall Risk  19-24 = Moderate Fall Risk  25-28 = Low Fall Risk  Tinetti ME. Performance-Oriented Assessment of Mobility Problems in Elderly Patients. [place] 66; [id number].  (Scoring Description: PT Bulletin Feb. 10, 1993)    Older adults: Simona Spann et al, 2009; n = 1000 [facility] elderly evaluated with ABC, LESLIE, ADL, and IADL)  · Mean LESLIE score for males aged 69-68 years = 26.21(3.40)  · Mean LESLIE score for females age 69-68 years = 25.16(4.30)  · Mean LESLIE score for males over 80 years = 23.29(6.02)  · Mean LESLIE score for females over 80 years = 17.20(8.32)         G codes: In compliance with CMSs Claims Based Outcome Reporting, the following G-code set was chosen for this patient based on their primary functional limitation being treated: The outcome measure chosen to determine the severity of the functional limitation was the Tinetti with a score of 11/28 which was correlated with the impairment scale. ? Mobility - Walking and Moving Around:     - CURRENT STATUS: CL - 60%-79% impaired, limited or restricted    - GOAL STATUS: CK - 40%-59% impaired, limited or restricted    - D/C STATUS:  ---------------To be determined---------------      Pain:   Pt with no c/o pain during session though gait pattern suggests otherwise  Activity Tolerance:   VSS  After treatment:   [x]         Patient left in no apparent distress sitting up in chair  []         Patient left in no apparent distress in bed  [x]         Call bell left within reach  [x]         Nursing notified  []         Caregiver present  []         Bed alarm activated    COMMUNICATION/EDUCATION:   The patients plan of care was discussed with: Registered Nurse. [x]         Fall prevention education was provided and the patient/caregiver indicated understanding. [x]         Patient/family have participated as able in goal setting and plan of care. [x]         Patient/family agree to work toward stated goals and plan of care. []         Patient understands intent and goals of therapy, but is neutral about his/her participation. []         Patient is unable to participate in goal setting and plan of care.     Thank you for this referral.  Marquis De La Vega   Time Calculation: 37 mins

## 2017-10-31 NOTE — PROGRESS NOTES
Occupational Therapy EVALUATION/discharge  Patient: Nasir Hurst (25 y.o. male)  Date: 10/31/2017  Primary Diagnosis: DJD RIGHT HIP  Primary osteoarthritis of right hip  Primary osteoarthritis of right hip  Procedure(s) (LRB):  RIGHT TOTAL HIP REPLACEMENT ANTERIOR APPROACH  (Right) 1 Day Post-Op   Precautions: WBAT, Fall    ASSESSMENT:   Based on the objective data described below, the patient presents with reduced activity tolerance and AROM in RLE limiting safety and independence with ADLs and functional mobility tasks. Pt reports having equipment and supports available at home upon discharge, but is interested in additional AE for dressing (sock aid, dressing stick). Pt was encountered with clothes donned and did not wish to participate in additional education of compensatory dressing techniques. Pt demonstrates fair standing balance while completing functional mobility and ADL tasks, with no LOB episodes observed. Pt demonstrates good technique with getting up and down from chair with minimal verbal cues for hand placement. Pt reports having significant improved mobility and reduction in pain following surgery. Pt provided with additional recommendations related to activity pacing and DME to promote safety and recovery at home upon discharge, with pt and spouse verbalizing understanding. Based on observations of pt's occupational performance and supports that are in place, pt does not require additional skilled acute OT services at this time. Discharge recommendation for home with family support as needed. Further skilled acute occupational therapy is not indicated at this time. Discharge Recommendations: Home with family support as needed  Further Equipment Recommendations for Discharge: Sock aid, dressing stick      SUBJECTIVE:   Patient stated I need to get one of those sock things.     OBJECTIVE DATA SUMMARY:   HISTORY:   Past Medical History:   Diagnosis Date    Cancer [facility])     Prostate    Diabetes [facility] [address]     Ill-defined condition     ISSUES WITH CLOTTING FACTOR VIII     Past Surgical History:   Procedure Laterality Date    ABDOMEN SURGERY PROC UNLISTED  1954    RIGHT CONGENITAL INGUINAL HERNIA REPAIR    ABDOMEN SURGERY PROC UNLISTED  1991    LEFT INGUINAL HERNIA REPAIR    ABDOMEN SURGERY PROC UNLISTED      SIGMOIDOSCOPY-FOR HEMMORIODS.   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 Expanded or extensive additional review of patient history:     Home Situation  Home Environment: Private residence  # Steps to Enter: 1  Wheelchair Ramp: No  One/Two Story Residence: One story  Living Alone: No  Support Systems: Spouse/Significant Other/Partner  Patient Expects to be Discharged to[de-identified] Private residence  Current DME Used/Available at Home: Cane, straight, Walker, rolling, Commode, bedside, Adaptive dressing aides  Tub or Shower Type: Shower  []  Right hand dominant   []  Left hand dominant    EXAMINATION OF PERFORMANCE DEFICITS:  Cognitive/Behavioral Status:  Neurologic State: Alert; Appropriate for age  Orientation Level: Oriented X4;Appropriate for age  Cognition: Appropriate for age attention/concentration; Follows commands  Perception: Appears intact  Perseveration: No perseveration noted  Safety/Judgement: Awareness of environment    Skin: Visible skin intact. Surgical dressing clean and dry. Edema: No significant edema observed during session. Hearing:   Auditory  Auditory Impairment: None    Vision/Perceptual:                 Acuity: Within Defined Limits         Range of Motion:  AROM: Within functional limits      Strength:  Strength: Within functional limits     Coordination:  Coordination: Within functional limits  Fine Motor Skills-Upper: Left Intact; Right Intact    Gross Motor Skills-Upper: Left Intact; Right Intact    Tone & Sensation:  Tone: Normal  Sensation: Intact           Balance:  Sitting: Intact  Standing: Impaired; With support  Standing - Static: Fair  Standing - Dynamic : Fair    Functional Mobility and Transfers for ADLs:  Bed Mobility:  Supine to Sit: Supervision; Additional time (cues for completion out right side)  Sit to Supine:  (NT; OOB to chair)  Scooting: Supervision    Transfers:  Sit to Stand: Contact guard assistance;Assist x1;Additional time; Adaptive equipment  Stand to Sit: Contact guard assistance; Adaptive equipment;Assist x1;Additional time  Toilet Transfer : Contact guard assistance; Adaptive equipment; Additional time    ADL Assessment:  Feeding: Independent    Oral Facial Hygiene/Grooming: Setup;Supervision    Bathing: Minimum assistance; Additional time; Adaptive equipment    Upper Body Dressing: Setup;Supervision    Lower Body Dressing: Adaptive equipment; Additional time;Minimum assistance    Toileting: Supervision        ADL Intervention and task modifications:   Pt encountered seated in chair with spouse present in room. Pt completed sit to stand with CGA and RW. Pt performed functional mobility task to bathroom with supervision and use of RW. Pt completed toileting task with supervision standing up with RW. Pt completed grooming task with supervision standing at sink. Pt returned to room and sat in chair with CGA and RW. Pt was provided with education on additional AE and DME in preparation for discharge. Pt described DME that is currently in place at home. Pt left in no apparent distress seated in chair with call bell within reach.      Grooming  Grooming Assistance: Supervision/set up  Washing Hands: Supervision/set-up Toileting  Toileting Assistance: Contact guard assistance  Bladder Hygiene: Contact guard assistance  Clothing Management: Contact guard assistance  Adaptive Equipment: Walker    Cognitive Retraining  Safety/Judgement: Awareness of environment    Functional Measure:  Barthel Index:    Bathin  Bladder: 10  Bowels: 10  Groomin  Dressin  Feeding: 10  Mobility: 10  Stairs: 5  Toilet Use: 5  Transfer (Bed to Chair and Back): 10  Total: 70       Barthel and G-code impairment scale:  Percentage of impairment CH  0% CI  1-19% CJ  20-39% CK  40-59% CL  60-79% CM  80-99% CN  100%   Barthel Score 0-100 100 99-80 79-60 59-40 20-39 1-19   0   Barthel Score 0-20 20 17-19 13-16 9-12 5-8 1-4 0      The Barthel ADL Index: Guidelines  1. The index should be used as a record of what a patient does, not as a record of what a patient could do. 2. The main aim is to establish degree of independence from any help, physical or verbal, however minor and for whatever reason. 3. The need for supervision renders the patient not independent. 4. A patient's performance should be established using the best available evidence. Asking the patient, friends/relatives and nurses are the usual sources, but direct observation and common sense are also important. However direct testing is not needed. 5. Usually the patient's performance over the preceding 24-48 hours is important, but occasionally longer periods will be relevant. 6. Middle categories imply that the patient supplies over 50 per cent of the effort. 7. Use of aids to be independent is allowed. Reinier Payne, Barthel, D.W. (7039). Functional evaluation: the Barthel Index. [address] [facility] (14)2. JULIET Reynolds, Emilia Ruiz., [facility] Olga., [place], [address] [place] [address] (). Measuring the change indisability after inpatient rehabilitation; comparison of the responsiveness of the Barthel Index and Functional [place] Measure.  Journal of Neurology, Neurosurgery, and Psychiatry, 66(4), 641-868. ELIA Richmond.TANI, WAYNE Yeung, & Pino Tse M.A. (2004.) Assessment of post-stroke quality of life in cost-effectiveness studies: The usefulness of the Barthel Index and the EuroQoL-5D. Quality of Life Research, 13, 184-10       G codes: In compliance with CMSs Claims Based Outcome Reporting, the following G-code set was chosen for this patient based on their primary functional limitation being treated: The outcome measure chosen to determine the severity of the functional limitation was the Barthel Index with a score of 70/100 which was correlated with the impairment scale. ? Self Care:     - CURRENT STATUS: CJ - 20%-39% impaired, limited or restricted    - GOAL STATUS: CJ - 20%-39% impaired, limited or restricted    - D/C STATUS:  CJ - 20%-39% impaired, limited or restricted       Occupational Therapy Evaluation Charge Determination   History Examination Decision-Making   LOW Complexity : Brief history review  MEDIUM Complexity : 3-5 performance deficits relating to physical, cognitive , or psychosocial skils that result in activity limitations and / or participation restrictions MEDIUM Complexity : Patient may present with comorbidities that affect occupational performnce. Miniml to moderate modification of tasks or assistance (eg, physical or verbal ) with assesment(s) is necessary to enable patient to complete evaluation       Based on the above components, the patient evaluation is determined to be of the following complexity level: LOW   Pain:                  Activity Tolerance:   Pt tolerated session activities with no acute changes in vital signs or signs and symptoms of orthostatic hypotension or distress.     After treatment:   [x]  Patient left in no apparent distress sitting up in chair  []  Patient left in no apparent distress in bed  [x]  Call bell left within reach  []  Nursing notified  [x]  Caregiver present  []  Bed alarm activated    COMMUNICATION/EDUCATION:   Communication/Collaboration:  [x]      Home safety education was provided and the patient/caregiver indicated understanding. [x]      Patient/family have participated as able and agree with findings and recommendations. []      Patient is unable to participate in plan of care at this time. Findings and recommendations were discussed with: Physical Therapist and Registered Nurse    SRIKANTH Mendoza    Regarding student involvement in patient care:  A student participated in this treatment session. Per CMS Medicare statements and AOTA guidelines I certify that the following was true:  1. I was present and directly observed the entire session. 2. I made all skilled judgments and clinical decisions regarding care. 3. I am the practitioner responsible for assessment, treatment, and documentation.       Yadira Victoria, OT

## 2017-10-31 NOTE — PROGRESS NOTES
Problem: Falls - Risk of  Goal: *Absence of Falls  Document Caleb Fall Risk and appropriate interventions in the flowsheet.   Outcome: Progressing Towards Goal  Fall Risk Interventions:

## 2017-10-31 NOTE — DIABETES MGMT
DTC Consult Note    Recommendations/ Comments: Elevated BG's yesterday 187 - 366.  mg/dL this AM  POD 1 total hip. He stopped his home medications on 10/28/2017 as instructed. For discharge today    Please consider the following:  Resume home medications Metformin 500 mg BID and Amaryl 4 mg daily  Once he goes home, resume Victoza 1.2 mg dialy  ____________________________    Consult received for:   []           Hospital Medication Recommendations                 [x]           Hospital Blood Glucose Management and                                                     Post-op education    Chart reviewed and initial evaluation complete on Christopher Saeed. Patient is a 69 y.o. male with known DM on the following at home:  Victoza 1.2 mg daily  Metformin 500 mg BID and  Amaryl 4 mg daily     DTC saw patient today and provided post-op education including risk of infection. He has a meter and tests 2-3x/day. BG's are not below 80 or above 180 at home. He takes his medication routienly  He reports a significant improvement in BG's since starting Victoza 6 weeks ago. His A1c had 9%. A few years ago he lost 120 lbs. He is eating appropriately  He sees his PCP in 3 weeks. Self Care for Diabetes booklet and oupt phone number given. A1c:   Lab Results   Component Value Date/Time    Hemoglobin A1c 7.1 10/30/2017 05:41 PM       Recent Glucose Results: Lab Results   Component Value Date/Time     (H) 10/31/2017 04:47 AM    GLUCPOC 283 (H) 10/31/2017 07:22 AM    GLUCPOC 366 (H) 10/30/2017 09:32 PM    GLUCPOC 207 (H) 10/30/2017 05:28 PM        Lab Results   Component Value Date/Time    Creatinine 1.16 10/31/2017 04:47 AM       Active Orders   Diet    DIET DIABETIC CONSISTENT CARB Regular        PO intake: Patient Vitals for the past 72 hrs:   % Diet Eaten   10/31/17 0850 100 %       Current hospital DM medication: lispro normal sensitivity correction scale    Thank you.   Sharon Hernández RN, CDE

## 2017-10-31 NOTE — PROGRESS NOTES
Pt.s bedtime BS noted to be 366. Scale doesn't go that high and had to call the Doctor on call, Dr. Heavenly Foster gave order to just give the max dose of 4units. When putting in the order noted that Doctor Didn't have Privileges in [address] [facility] and had to put the order in under Dr. Roseanne Edwards who is the Attending.

## 2017-10-31 NOTE — DISCHARGE SUMMARY
Orthopedic Service Discharge Summary    Patient ID:  Owen Samuels  [id number]  male  69 y.o.  1950    Admit date: 10/30/2017    Discharge date and time: 10/31/2017  2:17 PM     Admitting Physician: Ashok Angel MD     Discharge Physician: Ashok Angel MD    Consulting Physician(s): Treatment Team: Utilization Review: Rojas Bernard RN; Care Manager: Jose Sifuentes LCSW    Date of Surgery: 10/30/2017     Preoperative Diagnosis:  DJD RIGHT HIP    Postoperative Diagnosis: DJD RIGHT HIP    Procedure(s): Procedure(s):  RIGHT TOTAL HIP REPLACEMENT ANTERIOR APPROACH     Surgeon: Linda Lane) and Role:     Vicki Major MD - Primary      Anesthesia:  General    Preoperative Medical Clearance:                           HPI:  Pt is a 69 y.o. male who has a history of DJD RIGHT HIP  Primary osteoarthritis of right hip  Primary osteoarthritis of right hip  with pain and limitations of activities of daily living who presents at this time for a right total hip arthroplasty following the failure of conservative management. PMH:   Past Medical History:   Diagnosis Date    Cancer ([facility] [address]     Prostate    Diabetes ([facility] [address]     Ill-defined condition     ISSUES WITH CLOTTING FACTOR VIII       Medications upon admission :   Prior to Admission Medications   Prescriptions Last Dose Informant Patient Reported? Taking? Liraglutide (VICTOZA 2-CELESTINE) 0.6 mg/0.1 mL (18 mg/3 mL) pnij 10/29/2017 at 0800  Yes Yes   Si.6 mg by SubCUTAneous route. TAKES 1.2 ML IN AM WHEN HE WAKES UP.   butalbital-acetaminophen-caffeine (FIORICET, ESGIC) -40 mg per tablet 10/16/2017  Yes No   Sig: Take 1 Tab by mouth every six (6) hours as needed for Pain.   diazePAM (VALIUM) 5 mg tablet Unknown at Unknown time  Yes No   Sig: Take 5 mg by mouth as needed for Anxiety. escitalopram oxalate (LEXAPRO) 20 mg tablet 10/27/2017  Yes No   Sig: Take 20 mg by mouth daily (with breakfast).    glimepiride (AMARYL) 4 mg tablet 10/29/2017 at 2200  Yes Yes   Sig: Take  by mouth two (2) times a day. TAKES 1/2=2 MG IN AM & IN PM   irbesartan (AVAPRO) 150 mg tablet 10/29/2017 at 0800  Yes Yes   Sig: Take 150 mg by mouth daily. IN AM   metFORMIN (GLUCOPHAGE) 500 mg tablet 10/29/2017 at 2000  Yes Yes   Sig: Take 500 mg by mouth two (2) times daily (with meals). simvastatin (ZOCOR) 40 mg tablet 10/28/2017 at Unknown time  Yes Yes   Sig: Take  by mouth nightly. suvorexant (BELSOMRA) 20 mg tablet 10/23/2017 at Unknown time  Yes Yes   Sig: Take  by mouth nightly as needed for Insomnia.   traMADol (ULTRAM) 50 mg tablet 10/29/2017 at 0800  Yes Yes   Sig: Take 50 mg by mouth two (2) times a day. 1 TAB IN AM & 1 TAB IN PM      Facility-Administered Medications: None        Allergies: Allergies   Allergen Reactions    Ddavp [Desmopressin] Other (comments)     KIDNEY ISSUES. Hospital Course: The patient underwent surgery. Complications:  None; patient tolerated the procedure well. Was taken to the PACU in stable condition and then transferred to the Orthopedics floor. Condition on discharge: good    Perioperative Antibiotics:  Ancef     Postoperative Pain Management:  acetaminophen and mobic, oxycodone    DVT Prophylaxis: xarelto 10mg    Postoperative transfusions:     none Banked PRBCs     Post Op complications: none    Hemoglobin at discharge:    Lab Results   Component Value Date/Time    HGB 12.4 10/31/2017 04:47 AM    INR 1.0 10/05/2017 09:09 AM       Dressing was changed on POD # 2. Incision - clean, dry and intact. No significant erythema or swelling. Neurovascular exam within normal limits. Wound appears to be healing without any evidence of infection. Physical Therapy started on the day following surgery and progressed to ambulation with the aid of a rolling walker for distances of 100 feet with supervision. Range of motion  limited by pain.   At the time of discharge, able to go up and down stairs and had understanding of precautions needed following surgery. Discharged to: Home. Discharge instructions:  -See Full Summary of discharge instructions attached  -Anticoagulate with xarelto  -Resume pre hospital diet            -Resume home medications   Discharge Medication List as of 10/31/2017  1:47 PM      START taking these medications    Details   acetaminophen (TYLENOL) 500 mg tablet Take 2 Tabs by mouth every six (6) hours. , Print, Disp-120 Tab, R-0      meloxicam (MOBIC) 15 mg tablet Take 1 Tab by mouth daily. , Print, Disp-29 Tab, R-0      oxyCODONE IR (ROXICODONE) 5 mg immediate release tablet Take 1 Tab by mouth every four (4) hours as needed. Max Daily Amount: 30 mg., Print, Disp-40 Tab, R-0      rivaroxaban (XARELTO) 10 mg tablet Take 1 Tab by mouth daily (with lunch). Indications: HIP SURGERY DEEP VEIN THROMBOSIS PREVENTION, Print, Disp-29 Tab, R-0         CONTINUE these medications which have NOT CHANGED    Details   butalbital-acetaminophen-caffeine (FIORICET, ESGIC) -40 mg per tablet Take 1 Tab by mouth every six (6) hours as needed for Pain., Historical Med      diazePAM (VALIUM) 5 mg tablet Take 5 mg by mouth as needed for Anxiety. , Historical Med      simvastatin (ZOCOR) 40 mg tablet Take  by mouth nightly., Historical Med      glimepiride (AMARYL) 4 mg tablet Take  by mouth two (2) times a day. TAKES 1/2=2 MG IN AM & IN PM, Historical Med      escitalopram oxalate (LEXAPRO) 20 mg tablet Take 20 mg by mouth daily (with breakfast). , Historical Med      suvorexant (BELSOMRA) 20 mg tablet Take  by mouth nightly as needed for Insomnia., Historical Med      Liraglutide (VICTOZA 2-CELESTINE) 0.6 mg/0.1 mL (18 mg/3 mL) pnij 0.6 mg by SubCUTAneous route. TAKES 1.2 ML IN AM WHEN HE WAKES UP., Historical Med      metFORMIN (GLUCOPHAGE) 500 mg tablet Take 500 mg by mouth two (2) times daily (with meals). , Historical Med      irbesartan (AVAPRO) 150 mg tablet Take 150 mg by mouth daily.  IN AM, Historical Med         STOP taking these medications       traMADol (ULTRAM) 50 mg tablet Comments:   Reason for Stopping:            per medical continuation form  -Discharge activity: Activity as tolerated  -Ambulate with Walkers, Type: Rolling Walker, appropriate total joint protocol  -Wound Care Keep wound clean and dry, Reinforce dressing PRN, Ice to area for comfort and As directed  -Follow up in office in 2 weeks      Signed:  Madison Meeks PA-C  10/31/2017  10:19 AM        No att. providers found

## 2017-10-31 NOTE — PROGRESS NOTES
Primary Nurse Ramy Fulton RN and Shanita Duran RN performed a dual skin assessment on this patient No impairment noted  Benjamin score is 21

## 2017-10-31 NOTE — PROGRESS NOTES
Bedside and Verbal shift change report given to Tim Lemon RN (oncoming nurse) by Rebecca Lama RN (offgoing nurse). Report included the following information SBAR.

## 2017-10-31 NOTE — PROGRESS NOTES
Care Management Interventions  PCP Verified by CM: Yes Abbey Helms MD)  Mode of Transport at Discharge: Other (see comment)  Transition of Care Consult (CM Consult):  (None)  MyChart Signup: No  Discharge Durable Medical Equipment: No  Physical Therapy Consult: Yes  Occupational Therapy Consult: No  Speech Therapy Consult: No  Current Support Network: Other  Plan discussed with Pt/Family/Caregiver: Yes  Freedom of Choice Offered:  (N/A)  Discharge Location  Discharge Placement: Home with family assistance    CM met with patient and life partner Teresa Coreas with whom he lives. Patient reports good family /social support. Patient expects return to independent functioning. Patient confirmed PCP, health insurance, and prescription coverage. Transport at discharge will be provided by Teresa Coreas. CM noted that PT had no discharge recommendations. Patient confirmed that he has no discharge planning needs.

## 2017-10-31 NOTE — PROGRESS NOTES
[facility]  SBAR Bundled Payment Handoff     FROM:                                TO: No Home Care per Lizbeth Milton                                                      ([address] [place] [address] or Facility name)  [address]  [address] [address]  Dept: [address] [facility] [address]: [phone]                                      Room#:  204/78                                                      Discharging Nurse:  Dallas Charles RN  Unit XB#:[phone]         SITUATION      ASAScore: ASA 2 - Patient with mild systemic disease with no functional limitations    Admitted:  10/30/2017  Hospital Day: 2      Attending Provider:  No att. providers found     Consultations:  None    PCP:  Stevie Leone MD   [phone]     Admitting Dx:  DJD RIGHT HIP  Primary osteoarthritis of right hip  Primary osteoarthritis of right hip       Principal Problem:    Primary osteoarthritis of right hip (10/30/2017)      1 Day Post-Op of   Procedure(s):  RIGHT TOTAL HIP REPLACEMENT ANTERIOR APPROACH    BY: Wes Upton MD             ON: 10/30/2017                  Code Status: Full Code             Advance Directive? Yes Not W Pt (Send w/patient)     Isolation:  There are currently no Active Isolations       MDRO: No current active infections    BACKGROUND     Allergies: Allergies   Allergen Reactions    Ddavp [Desmopressin] Other (comments)     KIDNEY ISSUES.        Past Medical History:   Diagnosis Date    Cancer ([address]     Prostate    Diabetes ([facility] [address]     Ill-defined condition     ISSUES WITH CLOTTING FACTOR VIII       Past Surgical History:   Procedure Laterality Date    ABDOMEN SURGERY PROC UNLISTED  1954    RIGHT CONGENITAL INGUINAL HERNIA REPAIR    ABDOMEN SURGERY PROC UNLISTED  1991    LEFT INGUINAL HERNIA REPAIR    ABDOMEN SURGERY PROC UNLISTED      SIGMOIDOSCOPY-FOR HEMMORIODS.    HX APPENDECTOMY      HX GI  2017    COLONOSCOPY WITH NO POLYPS    HX HEENT  1949    T&A    HX HEENT  1972    EXTRACTION OF WISDOM TEETH X 4 (2 SURGERIES 2 TEETH AT A TIME)    HX ORTHOPAEDIC  2006    RIGHT HAMMER TOE FIXED    HX OTHER SURGICAL  1963    RIGHT INNER THIGH CEBACIOUS CYST REMOVED    HX PROSTATECTOMY  2012    RADICAL PROSTATECTOMY       Prior to Admission Medications   Prescriptions Last Dose Informant Patient Reported? Taking? Liraglutide (VICTOZA 2-CELESTINE) 0.6 mg/0.1 mL (18 mg/3 mL) pnij 10/29/2017 at 0800  Yes Yes   Si.6 mg by SubCUTAneous route. TAKES 1.2 ML IN AM WHEN HE WAKES UP.   butalbital-acetaminophen-caffeine (FIORICET, ESGIC) -40 mg per tablet 10/16/2017  Yes No   Sig: Take 1 Tab by mouth every six (6) hours as needed for Pain.   diazePAM (VALIUM) 5 mg tablet Unknown at Unknown time  Yes No   Sig: Take 5 mg by mouth as needed for Anxiety. escitalopram oxalate (LEXAPRO) 20 mg tablet 10/27/2017  Yes No   Sig: Take 20 mg by mouth daily (with breakfast). glimepiride (AMARYL) 4 mg tablet 10/29/2017 at 2200  Yes Yes   Sig: Take  by mouth two (2) times a day. TAKES 1/2=2 MG IN AM & IN PM   irbesartan (AVAPRO) 150 mg tablet 10/29/2017 at 0800  Yes Yes   Sig: Take 150 mg by mouth daily. IN AM   metFORMIN (GLUCOPHAGE) 500 mg tablet 10/29/2017 at 2000  Yes Yes   Sig: Take 500 mg by mouth two (2) times daily (with meals). simvastatin (ZOCOR) 40 mg tablet 10/28/2017 at Unknown time  Yes Yes   Sig: Take  by mouth nightly. suvorexant (BELSOMRA) 20 mg tablet 10/23/2017 at Unknown time  Yes Yes   Sig: Take  by mouth nightly as needed for Insomnia. Facility-Administered Medications: None       Vaccinations: There is no immunization history on file for this patient. ASSESSMENT   Age: 69 y.o.              Gender: male        Height: Height: 5' 9\" (175.3 cm)                    Weight:Weight: 102.1 kg (225 lb)     Patient Vitals for the past 8 hrs:   Temp Pulse Resp BP SpO2   10/31/17 1134 98.8 °F (37.1 °C) 88 16 133/78 95 %   10/31/17 0751 97.6 °F (36.4 °C) 63 16 142/70 100 %            Active Orders   Diet    DIET DIABETIC CONSISTENT CARB Regular       Orientation: Orientation Level: Oriented X4, Appropriate for age    Active Lines/Drains:  (Peg Tube / Vargas / CL or S/L?):no    Urinary Status: Voiding      Last BM: Last Bowel Movement Date: 10/30/17     Skin Integrity: Incision (comment) (right hip)   Wound Hip Right-DRESSING STATUS: Clean, dry, and intact    Wound Hip Right-DRESSING TYPE: Silver products (Aquacel)    Mobility: Slightly limited   Weight Bearing Status: WBAT (Weight Bearing as Tolerated)      Gait Training  Assistive Device: Gait belt, Walker, rolling  Ambulation - Level of Assistance: Contact guard assistance (1 instance of LOB with standing therex review)  Distance (ft): 100 Feet (ft)  Stairs - Level of Assistance: Contact guard assistance, Additional time  Number of Stairs Trained: 2  Rail Use:  (none-platform steps with walker)     On Anticoagulation? YES  Xarelto                                      Last dose:  10/31/17at 0104    Pain Medications given:  Oxycodone 7.5                                Last dose: 10/31/17 at  1335    Lab Results   Component Value Date/Time    Glucose 288 10/31/2017 04:47 AM    Hemoglobin A1c 7.1 10/30/2017 05:41 PM    INR 1.0 10/05/2017 09:09 AM    HGB 12.4 10/31/2017 04:47 AM    HGB 14.0 10/05/2017 09:09 AM    HGB 13.9 05/17/2015 08:45 PM       Readmission Risks:  Score:         RECOMMENDATION     See After Visit Summary (AVS) for:  · Discharge instructions  · After [address]   · Medication Reconciliation          [facility] Orthopaedic Nurse Navigator  NIECY Ca, RN-BC       Office  [phone]  Cell      [phone]  Fax      [phone]  [email or web address]             . [place]

## 2017-10-31 NOTE — PROGRESS NOTES
Bedside shift change report given to Luzma Vasquez RN (oncoming nurse) by VI Martinez RN (offgoing nurse). Report included the following information SBAR.

## 2017-11-03 ENCOUNTER — NURSE NAVIGATOR (OUTPATIENT)
Dept: OTHER | Age: 67
End: 2017-11-03

## 2017-11-03 NOTE — PROGRESS NOTES
This note will not be viewable in [address] Post Discharge Follow-up contact after Joint Replacement    Patient discharged on 10/31/17  By  Reyes Hernandez   following  right hip Arthroplasty. Spoke with patient today, who reports they are \" not having any pain at all; I went to work yesterday for a couple of hours. \"  Denies Fever, Shortness of Breath or Chest Pain. Home Health has not visited - No home health ordered. Patient also reports:. Incision  clean, dry, intact  Calf is non-tender,   operative extremity has minimal swelling. Pain is well managed. Discussed use of ice & elevation. He is exercising independently. Taking Xarelto for anticoagulation. Patient   is not experiencing symptoms of constipation & urinating without difficulty. Discussed side effects of anticoagulants & pain medications (bleeding/bruising, constipation, lightheaded/dizziness)  Follow up appointment is not scheduled; but patient states plan to schedule. Discussed calling surgeon Dr Michael Apodaca  for drainage, bleeding, swelling in operative extremity, fever or pain. Discussed calling PCP Dr Toby Mahmood with other medical issues.

## 2019-01-08 ENCOUNTER — APPOINTMENT (OUTPATIENT)
Dept: CT IMAGING | Age: 69
End: 2019-01-08
Attending: EMERGENCY MEDICINE
Payer: MEDICARE

## 2019-01-08 ENCOUNTER — HOSPITAL ENCOUNTER (EMERGENCY)
Age: 69
Discharge: HOME | End: 2019-01-08
Attending: EMERGENCY MEDICINE | Admitting: EMERGENCY MEDICINE
Payer: MEDICARE

## 2019-01-08 ENCOUNTER — APPOINTMENT (OUTPATIENT)
Dept: GENERAL RADIOLOGY | Age: 69
End: 2019-01-08
Attending: EMERGENCY MEDICINE
Payer: MEDICARE

## 2019-01-08 VITALS
HEIGHT: 72 IN | OXYGEN SATURATION: 98 % | BODY MASS INDEX: 29.39 KG/M2 | WEIGHT: 217 LBS | RESPIRATION RATE: 16 BRPM | SYSTOLIC BLOOD PRESSURE: 143 MMHG | HEART RATE: 93 BPM | DIASTOLIC BLOOD PRESSURE: 88 MMHG | TEMPERATURE: 99.1 F
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
## 2019-01-08 DIAGNOSIS — S16.1XXA NECK STRAIN, INITIAL ENCOUNTER: ICD-10-CM

## 2019-01-08 PROCEDURE — 72125 CT NECK SPINE W/O DYE: CPT

## 2019-01-08 PROCEDURE — 73562 X-RAY EXAM OF KNEE 3: CPT

## 2019-01-08 PROCEDURE — 74011250637 HC RX REV CODE- 250/637: Performed by: EMERGENCY MEDICINE

## 2019-01-08 PROCEDURE — 99283 EMERGENCY DEPT VISIT LOW MDM: CPT

## 2019-01-08 PROCEDURE — 70450 CT HEAD/BRAIN W/O DYE: CPT

## 2019-01-08 RX ORDER — ACETAMINOPHEN 325 MG/1
975 TABLET ORAL
Status: COMPLETED | OUTPATIENT
Start: 2019-01-08 | End: 2019-01-08

## 2019-01-08 RX ADMIN — ACETAMINOPHEN 975 MG: 325 TABLET, FILM COATED ORAL at 16:51

## 2019-01-08 NOTE — DISCHARGE INSTRUCTIONS
Patient Education        Neck Strain: Care Instructions  Your Care Instructions    You have strained the muscles and ligaments in your neck. A sudden, awkward movement can strain the neck. This often occurs with falls or car accidents or during certain sports. Everyday activities like working on a computer or sleeping can also cause neck strain if they force you to hold your neck in an awkward position for a long time. It is common for neck pain to get worse for a day or two after an injury, but it should start to feel better after that. You may have more pain and stiffness for several days before it gets better. This is expected. It may take a few weeks or longer for it to heal completely. Good home treatment can help you get better faster and avoid future neck problems. Follow-up care is a key part of your treatment and safety. Be sure to make and go to all appointments, and call your doctor if you are having problems. It's also a good idea to know your test results and keep a list of the medicines you take. How can you care for yourself at home? · If you were given a neck brace (cervical collar) to limit neck motion, wear it as instructed for as many days as your doctor tells you to. Do not wear it longer than you were told to. Wearing a brace for too long can make neck stiffness worse and weaken the neck muscles. · You can try using heat or ice to see if it helps. ? Try using a heating pad on a low or medium setting for 15 to 20 minutes every 2 to 3 hours. Try a warm shower in place of one session with the heating pad. You can also buy single-use heat wraps that last up to 8 hours. ? You can also try an ice pack for 10 to 15 minutes every 2 to 3 hours. · Take pain medicines exactly as directed. ? If the doctor gave you a prescription medicine for pain, take it as prescribed. ? If you are not taking a prescription pain medicine, ask your doctor if you can take an over-the-counter medicine.   · Gently rub the area to relieve pain and help with blood flow. Do not massage the area if it hurts to do so. · Do not do anything that makes the pain worse. Take it easy for a couple of days. You can do your usual activities if they do not hurt your neck or put it at risk for more stress or injury. · Try sleeping on a special neck pillow. Place it under your neck, not under your head. Placing a tightly rolled-up towel under your neck while you sleep will also work. If you use a neck pillow or rolled towel, do not use your regular pillow at the same time. · To prevent future neck pain, do exercises to stretch and strengthen your neck and back. Learn how to use good posture, safe lifting techniques, and proper body mechanics. When should you call for help? Call 911 anytime you think you may need emergency care. For example, call if:    · You are unable to move an arm or a leg at all.   [facility] your doctor now or seek immediate medical care if:    · You have new or worse symptoms in your arms, legs, chest, belly, or buttocks. Symptoms may include:  ? Numbness or tingling. ? Weakness. ? Pain.     · You lose bladder or bowel control.    Watch closely for changes in your health, and be sure to contact your doctor if:    · You are not getting better as expected. Where can you learn more? Go to http://roscoe-jack.info/. Enter M253 in the search box to learn more about \"Neck Strain: Care Instructions. \"  Current as of: November 29, 2017  Content Version: 11.8  © 8503-2212 [employer]. Care instructions adapted under license by [employer] (which disclaims liability or warranty for this information). If you have questions about a medical condition or this instruction, always ask your healthcare professional. [place] [address] any warranty or liability for your use of this information.          Patient Education        Neck Strain or Sprain: Rehab Exercises  Your Care Instructions  Here are some examples of typical rehabilitation exercises for your condition. Start each exercise slowly. Ease off the exercise if you start to have pain. Your doctor or physical therapist will tell you when you can start these exercises and which ones will work best for you. How to do the exercises  Neck rotation    1. Sit in a firm chair, or stand up straight. 2. Keeping your chin level, turn your head to the right, and hold for 15 to 30 seconds. 3. Turn your head to the left and hold for 15 to 30 seconds. 4. Repeat 2 to 4 times to each side. Neck stretches    1. Look straight ahead, and tip your right ear to your right shoulder. Do not let your left shoulder rise up as you tip your head to the right. 2. Hold for 15 to 30 seconds. 3. Tilt your head to the left. Do not let your right shoulder rise up as you tip your head to the left. 4. Hold for 15 to 30 seconds. 5. Repeat 2 to 4 times to each side. Forward neck flexion    1. Sit in a firm chair, or stand up straight. 2. Bend your head forward. 3. Hold for 15 to 30 seconds. 4. Repeat 2 to 4 times. Lateral (side) bend strengthening    1. With your right hand, place your first two fingers on your right temple. 2. Start to bend your head to the side while using gentle pressure from your fingers to keep your head from bending. 3. Hold for about 6 seconds. 4. Repeat 8 to 12 times. 5. Switch hands and repeat the same exercise on your left side. Forward bend strengthening    1. Place your first two fingers of either hand on your forehead. 2. Start to bend your head forward while using gentle pressure from your fingers to keep your head from bending. 3. Hold for about 6 seconds. 4. Repeat 8 to 12 times. Neutral position strengthening    1. Using one hand, place your fingertips on the back of your head at the top of your neck.   2. Start to bend your head backward while using gentle pressure from your fingers to keep your head from bending. 3. Hold for about 6 seconds. 4. Repeat 8 to 12 times. Chin tuck    1. Lie on the floor with a rolled-up towel under your neck. Your head should be touching the floor. 2. Slowly bring your chin toward your chest.  3. Hold for a count of 6, and then relax for up to 10 seconds. 4. Repeat 8 to 12 times. Follow-up care is a key part of your treatment and safety. Be sure to make and go to all appointments, and call your doctor if you are having problems. It's also a good idea to know your test results and keep a list of the medicines you take. Where can you learn more? Go to http://roscoe-jack.info/. Enter M679 in the search box to learn more about \"Neck Strain or Sprain: Rehab Exercises. \"  Current as of: November 29, 2017  Content Version: 11.8  © 6289-8541 [employer]. Care instructions adapted under license by [employer] (which disclaims liability or warranty for this information). If you have questions about a medical condition or this instruction, always ask your healthcare professional. [address] any warranty or liability for your use of this information.
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
## 2019-05-23 ENCOUNTER — HOSPITAL ENCOUNTER (EMERGENCY)
Age: 69
Discharge: HOME | End: 2019-05-24
Attending: EMERGENCY MEDICINE
Payer: MEDICARE

## 2019-05-23 ENCOUNTER — APPOINTMENT (OUTPATIENT)
Dept: GENERAL RADIOLOGY | Age: 69
End: 2019-05-23
Attending: EMERGENCY MEDICINE
Payer: MEDICARE

## 2019-05-23 DIAGNOSIS — M17.12 PRIMARY OSTEOARTHRITIS OF LEFT KNEE: ICD-10-CM

## 2019-05-23 DIAGNOSIS — R73.9 HYPERGLYCEMIA: Primary | ICD-10-CM

## 2019-05-23 DIAGNOSIS — M25.562 CHRONIC PAIN OF LEFT KNEE: ICD-10-CM

## 2019-05-23 DIAGNOSIS — G89.29 CHRONIC PAIN OF LEFT KNEE: ICD-10-CM

## 2019-05-23 PROCEDURE — 73562 X-RAY EXAM OF KNEE 3: CPT

## 2019-05-23 PROCEDURE — 99284 EMERGENCY DEPT VISIT MOD MDM: CPT

## 2019-05-24 VITALS
DIASTOLIC BLOOD PRESSURE: 78 MMHG | HEIGHT: 72 IN | TEMPERATURE: 99.3 F | BODY MASS INDEX: 30.07 KG/M2 | OXYGEN SATURATION: 97 % | HEART RATE: 92 BPM | SYSTOLIC BLOOD PRESSURE: 134 MMHG | RESPIRATION RATE: 15 BRPM | WEIGHT: 222 LBS

## 2019-05-24 LAB
ANION GAP BLD CALC-SCNC: 21 MMOL/L (ref 10–20)
APPEARANCE UR: CLEAR
ATRIAL RATE: 98 BPM
BASE DEFICIT BLDV-SCNC: 0.2 MMOL/L
BASOPHILS # BLD: 0.1 K/UL (ref 0–0.1)
BASOPHILS NFR BLD: 1 % (ref 0–1)
BDY SITE: ABNORMAL
BILIRUB UR QL: NEGATIVE
BUN BLD-MCNC: 25 MG/DL (ref 9–20)
CA-I BLD-MCNC: 1.11 MMOL/L (ref 1.12–1.32)
CALCULATED P AXIS, ECG09: 38 DEGREES
CALCULATED R AXIS, ECG10: -42 DEGREES
CALCULATED T AXIS, ECG11: 35 DEGREES
CHLORIDE BLD-SCNC: 100 MMOL/L (ref 98–107)
CO2 BLD-SCNC: 23 MMOL/L (ref 21–32)
COLOR UR: ABNORMAL
COMMENT, HOLDF: NORMAL
COMMENT, HOLDF: NORMAL
CREAT BLD-MCNC: 1 MG/DL (ref 0.6–1.3)
DIAGNOSIS, 93000: NORMAL
DIFFERENTIAL METHOD BLD: ABNORMAL
EOSINOPHIL # BLD: 0.2 K/UL (ref 0–0.4)
EOSINOPHIL NFR BLD: 2 % (ref 0–7)
ERYTHROCYTE [DISTWIDTH] IN BLOOD BY AUTOMATED COUNT: 13.6 % (ref 11.5–14.5)
FIO2 ON VENT: 21 %
GLUCOSE BLD STRIP.AUTO-MCNC: 229 MG/DL (ref 65–100)
GLUCOSE BLD-MCNC: 243 MG/DL (ref 65–100)
GLUCOSE UR STRIP.AUTO-MCNC: >1000 MG/DL
HCO3 BLDV-SCNC: 24 MMOL/L (ref 23–28)
HCT VFR BLD AUTO: 43.9 % (ref 36.6–50.3)
HCT VFR BLD CALC: 46 % (ref 36.6–50.3)
HGB BLD-MCNC: 14.8 G/DL (ref 12.1–17)
HGB UR QL STRIP: NEGATIVE
IMM GRANULOCYTES # BLD AUTO: 0 K/UL (ref 0–0.04)
IMM GRANULOCYTES NFR BLD AUTO: 0 % (ref 0–0.5)
KETONES UR QL STRIP.AUTO: NEGATIVE MG/DL
LEUKOCYTE ESTERASE UR QL STRIP.AUTO: NEGATIVE
LYMPHOCYTES # BLD: 1.7 K/UL (ref 0.8–3.5)
LYMPHOCYTES NFR BLD: 20 % (ref 12–49)
MCH RBC QN AUTO: 30.9 PG (ref 26–34)
MCHC RBC AUTO-ENTMCNC: 33.7 G/DL (ref 30–36.5)
MCV RBC AUTO: 91.6 FL (ref 80–99)
MONOCYTES # BLD: 1.1 K/UL (ref 0–1)
MONOCYTES NFR BLD: 12 % (ref 5–13)
NEUTS SEG # BLD: 5.6 K/UL (ref 1.8–8)
NEUTS SEG NFR BLD: 65 % (ref 32–75)
NITRITE UR QL STRIP.AUTO: NEGATIVE
NRBC # BLD: 0 K/UL (ref 0–0.01)
NRBC BLD-RTO: 0 PER 100 WBC
P-R INTERVAL, ECG05: 230 MS
PCO2 BLDV: 38 MMHG (ref 41–51)
PH BLDV: 7.42 [PH] (ref 7.32–7.42)
PH UR STRIP: 5.5 [PH] (ref 5–8)
PLATELET # BLD AUTO: 182 K/UL (ref 150–400)
PMV BLD AUTO: 12.1 FL (ref 8.9–12.9)
PO2 BLDV: 46 MMHG (ref 25–40)
POTASSIUM BLD-SCNC: 4.2 MMOL/L (ref 3.5–5.1)
PROT UR STRIP-MCNC: NEGATIVE MG/DL
Q-T INTERVAL, ECG07: 344 MS
QRS DURATION, ECG06: 80 MS
QTC CALCULATION (BEZET), ECG08: 439 MS
RBC # BLD AUTO: 4.79 M/UL (ref 4.1–5.7)
SAMPLES BEING HELD,HOLD: NORMAL
SAMPLES BEING HELD,HOLD: NORMAL
SAO2 % BLDV: 83 % (ref 65–88)
SAO2% DEVICE SAO2% SENSOR NAME: ABNORMAL
SERVICE CMNT-IMP: ABNORMAL
SERVICE CMNT-IMP: ABNORMAL
SODIUM BLD-SCNC: 138 MMOL/L (ref 136–145)
SP GR UR REFRACTOMETRY: 1.03 (ref 1–1.03)
SPECIMEN SITE: ABNORMAL
TROPONIN I SERPL-MCNC: <0.05 NG/ML
UROBILINOGEN UR QL STRIP.AUTO: 0.2 EU/DL (ref 0.2–1)
VENTRICULAR RATE, ECG03: 98 BPM
WBC # BLD AUTO: 8.7 K/UL (ref 4.1–11.1)

## 2019-05-24 PROCEDURE — 84484 ASSAY OF TROPONIN QUANT: CPT

## 2019-05-24 PROCEDURE — 74011250636 HC RX REV CODE- 250/636: Performed by: EMERGENCY MEDICINE

## 2019-05-24 PROCEDURE — 85025 COMPLETE CBC W/AUTO DIFF WBC: CPT

## 2019-05-24 PROCEDURE — 36415 COLL VENOUS BLD VENIPUNCTURE: CPT

## 2019-05-24 PROCEDURE — 80047 BASIC METABLC PNL IONIZED CA: CPT

## 2019-05-24 PROCEDURE — 82962 GLUCOSE BLOOD TEST: CPT

## 2019-05-24 PROCEDURE — 82803 BLOOD GASES ANY COMBINATION: CPT

## 2019-05-24 PROCEDURE — 96360 HYDRATION IV INFUSION INIT: CPT

## 2019-05-24 PROCEDURE — 93005 ELECTROCARDIOGRAM TRACING: CPT

## 2019-05-24 PROCEDURE — 81003 URINALYSIS AUTO W/O SCOPE: CPT

## 2019-05-24 PROCEDURE — 74011250637 HC RX REV CODE- 250/637: Performed by: EMERGENCY MEDICINE

## 2019-05-24 RX ORDER — OXYCODONE AND ACETAMINOPHEN 5; 325 MG/1; MG/1
1 TABLET ORAL
Status: COMPLETED | OUTPATIENT
Start: 2019-05-24 | End: 2019-05-24

## 2019-05-24 RX ORDER — OXYCODONE HYDROCHLORIDE 5 MG/1
5 TABLET ORAL
Qty: 7 TAB | Refills: 0 | Status: SHIPPED | OUTPATIENT
Start: 2019-05-24 | End: 2019-05-27

## 2019-05-24 RX ORDER — ACETAMINOPHEN 500 MG
1000 TABLET ORAL 3 TIMES DAILY
Qty: 24 TAB | Refills: 0 | Status: SHIPPED | OUTPATIENT
Start: 2019-05-24 | End: 2019-05-28

## 2019-05-24 RX ADMIN — OXYCODONE HYDROCHLORIDE AND ACETAMINOPHEN 1 TABLET: 5; 325 TABLET ORAL at 00:57

## 2019-05-24 RX ADMIN — SODIUM CHLORIDE 1000 ML: 900 INJECTION, SOLUTION INTRAVENOUS at 00:58

## 2019-05-24 NOTE — ED PROVIDER NOTES
77 y.o. male with past medical history significant for DM, who presents from home accompanied by his Daughter with chief complaint of left knee pain. Pt states he has chronic left knee pain at baseline, and is planning on having his left knee replaced this upcoming July. He notes his pain since yesterday morning has been worse than normal, increasing difficult with ambulation secondary to pain. Pt also complains of fatigue since awaking yesterday morning and had been in bed throughout the day. When getting up to use the restroom last night, he lost his balance once on his feet from the toilet causing him to fall into the bathtub. Denies any LOC or head trauma with the fall. Pt further notes his blood sugars have been fluctuating recently on the higher side. He last checked his sugar at 2130 last night, was 312 at that time, then he took 3 units of novolog. Pt additionally notes his PCP placed back on Metformin x4 days ago. Pt is currently on Plavix. He reports decreased PO fluids today. Pt specifically denies any fever, chills, headache, cough, congestion, shortness of breath, chest pain, abdominal pain, nausea, vomiting, diarrhea, dysuria. There are no other acute medical concerns at this time. PMHx: Significant for DM, prostate cancer, Von Willebrand disease, Factor VIII  PSHx: Significant for appendectomy, prostatectomy, hernia repair, orthopedic, colonoscopy  Social Hx: negative tobacco use, negative EtOH use, negative Illicit Drug use    PCP: Lamar Jackson MD    Note written by Elaina Benton, as dictated by Princess Ferreira MD 12:15 AM    The history is provided by the patient. No  was used.         Past Medical History:   Diagnosis Date    Cancer [facility])     Prostate    Diabetes ([facility] [address]     Ill-defined condition     ISSUES WITH CLOTTING FACTOR VIII    Von Willebrand disease ([facility] [address]        Past Surgical History:   Procedure Laterality Date    ABDOMEN SURGERY PROC UNLISTED 1954    RIGHT CONGENITAL INGUINAL HERNIA REPAIR    ABDOMEN SURGERY PROC UNLISTED  1991    LEFT INGUINAL HERNIA REPAIR    ABDOMEN SURGERY PROC UNLISTED      SIGMOIDOSCOPY-FOR HEMMORIODS.    HX APPENDECTOMY      HX ARTERIAL BYPASS      HX GI  2017    COLONOSCOPY WITH NO POLYPS    HX HEENT  1949    [facility]&A    HX HEENT  1972    EXTRACTION OF WISDOM TEETH X 4 (2 SURGERIES 2 TEETH AT A TIME)    HX ORTHOPAEDIC  02/2006    RIGHT HAMMER TOE FIXED    HX OTHER SURGICAL  1963    RIGHT INNER THIGH CEBACIOUS CYST REMOVED    HX PROSTATECTOMY  06/2012    RADICAL PROSTATECTOMY         Family History:   Problem Relation Age of Onset   [address] [facility] [address] Cancer Mother         ovarian    Cancer Father         PROSTATE CA, & NON MALIGNANT TUMOR SURGERY THAT HE DID NOT RRECOVER FROM & HAD RENAL SURGERY       Social History     Socioeconomic History    Marital status: LIFE PARTNER     Spouse name: Not on file    Number of children: Not on file    Years of education: Not on file    Highest education level: Not on file   Occupational History    Not on file   Social Needs    Financial resource strain: Not on file    Food insecurity:     Worry: Not on file     Inability: Not on file    Transportation needs:     Medical: Not on file     Non-medical: Not on file   Tobacco Use    Smoking status: Never Smoker    Smokeless tobacco: Never Used   Substance and Sexual Activity    Alcohol use: No    Drug use: No    Sexual activity: Not on file   Lifestyle    Physical activity:     Days per week: Not on file     Minutes per session: Not on file    Stress: Not on file   Relationships    Social connections:     Talks on phone: Not on file     Gets together: Not on file     Attends Jain service: Not on file     Active member of club or organization: Not on file     Attends meetings of clubs or organizations: Not on file     Relationship status: Not on file    Intimate partner violence:     Fear of current or ex partner: Not on file Emotionally abused: Not on file     Physically abused: Not on file     Forced sexual activity: Not on file   Other Topics Concern    Not on file   Social History Narrative    Not on file         ALLERGIES: Ddavp [desmopressin]    Review of Systems   Constitutional: Positive for fatigue. Negative for chills and fever. HENT: Negative for congestion and sore throat. Eyes: Negative for pain. Respiratory: Negative for shortness of breath. Cardiovascular: Negative for chest pain. Gastrointestinal: Negative for abdominal pain, diarrhea, nausea and vomiting. Genitourinary: Negative for dysuria and flank pain. Musculoskeletal: Positive for arthralgias (left knee). Negative for back pain and neck pain. Skin: Negative for rash. Neurological: Negative for dizziness and headaches. Vitals:    05/23/19 2237   BP: 131/71   Pulse: (!) 105   Resp: 18   Temp: 99.3 °F (37.4 °C)   SpO2: 95%   Weight: 100.7 kg (222 lb)   Height: 6' (1.829 m)            Physical Exam   Constitutional: He is oriented to person, place, and time. He appears well-developed and well-nourished. HENT:   Head: Normocephalic. Mouth/Throat: Oropharynx is clear and moist. Mucous membranes are dry (slightly). Eyes: Conjunctivae are normal.   Neck: Normal range of motion. Neck supple. Cardiovascular: Normal rate and regular rhythm. Pulmonary/Chest: Effort normal and breath sounds normal. No respiratory distress. Abdominal: Soft. Bowel sounds are normal. There is no tenderness. Musculoskeletal: Normal range of motion. Left knee: He exhibits swelling (mild). He exhibits normal range of motion and no erythema (or warmth). Neurological: He is alert and oriented to person, place, and time. No gross motor or sensory deficits   Skin: Skin is warm. Capillary refill takes less than 2 seconds. No rash noted.    Note written by Elaina Ferreira, as dictated by Toney Junior MD 12:15 AM     MDM  Number of Diagnoses or Management Options  Chronic pain of left knee:   Hyperglycemia:   Primary osteoarthritis of left knee:   Diagnosis management comments: Hyperglycemia: no DKA or HHS. Improved with home insulin tx and IVF. Pt will f/u with PCP    OA: no signs of infection. Will f/u ortho       Amount and/or Complexity of Data Reviewed  Clinical lab tests: reviewed  Tests in the radiology section of CPT®: reviewed      ED Course as of May 24 0131   Fri May 24, 2019   0121 No signs of DKA    [ZD]      ED Course User Index  [ZD] Sg Best MD     Recent Results (from the past 24 hour(s))   SAMPLES BEING HELD    Collection Time: 05/24/19 12:04 AM   Result Value Ref Range    SAMPLES BEING HELD 1UA,1UC     COMMENT        Add-on orders for these samples will be processed based on acceptable specimen integrity and analyte stability, which may vary by analyte. CBC WITH AUTOMATED DIFF    Collection Time: 05/24/19 12:40 AM   Result Value Ref Range    WBC 8.7 4.1 - 11.1 K/uL    RBC 4.79 4.10 - 5.70 M/uL    HGB 14.8 12.1 - 17.0 g/dL    HCT 43.9 36.6 - 50.3 %    MCV 91.6 80.0 - 99.0 FL    MCH 30.9 26.0 - 34.0 PG    MCHC 33.7 30.0 - 36.5 g/dL    RDW 13.6 11.5 - 14.5 %    PLATELET 998 634 - 708 K/uL    MPV 12.1 8.9 - 12.9 FL    NRBC 0.0 0  WBC    ABSOLUTE NRBC 0.00 0.00 - 0.01 K/uL    NEUTROPHILS 65 32 - 75 %    LYMPHOCYTES 20 12 - 49 %    MONOCYTES 12 5 - 13 %    EOSINOPHILS 2 0 - 7 %    BASOPHILS 1 0 - 1 %    IMMATURE GRANULOCYTES 0 0.0 - 0.5 %    ABS. NEUTROPHILS 5.6 1.8 - 8.0 K/UL    ABS. LYMPHOCYTES 1.7 0.8 - 3.5 K/UL    ABS. MONOCYTES 1.1 (H) 0.0 - 1.0 K/UL    ABS. EOSINOPHILS 0.2 0.0 - 0.4 K/UL    ABS. BASOPHILS 0.1 0.0 - 0.1 K/UL    ABS. IMM.  GRANS. 0.0 0.00 - 0.04 K/UL    DF AUTOMATED     URINALYSIS W/ RFLX MICROSCOPIC    Collection Time: 05/24/19 12:40 AM   Result Value Ref Range    Color YELLOW/STRAW      Appearance CLEAR CLEAR      Specific gravity 1.026 1.003 - 1.030      pH (UA) 5.5 5.0 - 8.0      Protein NEGATIVE NEG mg/dL    Glucose >1,000 (A) NEG mg/dL    Ketone NEGATIVE  NEG mg/dL    Bilirubin NEGATIVE  NEG      Blood NEGATIVE  NEG      Urobilinogen 0.2 0.2 - 1.0 EU/dL    Nitrites NEGATIVE  NEG      Leukocyte Esterase NEGATIVE  NEG     VENOUS BLOOD GAS    Collection Time: 05/24/19 12:40 AM   Result Value Ref Range    VENOUS PH 7.42 7.32 - 7.42      VENOUS PCO2 38 (L) 41 - 51 mmHg    VENOUS PO2 46 (H) 25 - 40 mmHg    VENOUS O2 SATURATION 83 65 - 88 %    VENOUS BICARBONATE 24 23 - 28 mmol/L    VENOUS BASE DEFICIT 0.2 mmol/L    O2 METHOD ROOM AIR      FIO2 21 %    Sample source VENOUS      SITE OTHER     TROPONIN I    Collection Time: 05/24/19 12:40 AM   Result Value Ref Range    Troponin-I, Qt. <0.05 <0.05 ng/mL   POC CHEM8    Collection Time: 05/24/19 12:45 AM   Result Value Ref Range    Calcium, ionized (POC) 1.11 (L) 1.12 - 1.32 mmol/L    Sodium (POC) 138 136 - 145 mmol/L    Potassium (POC) 4.2 3.5 - 5.1 mmol/L    Chloride (POC) 100 98 - 107 mmol/L    CO2 (POC) 23 21 - 32 mmol/L    Anion gap (POC) 21 (H) 10 - 20 mmol/L    Glucose (POC) 243 (H) 65 - 100 mg/dL    BUN (POC) 25 (H) 9 - 20 mg/dL    Creatinine (POC) 1.0 0.6 - 1.3 mg/dL    GFRAA, POC >60 >60 ml/min/1.73m2    GFRNA, POC >60 >60 ml/min/1.73m2    Hematocrit (POC) 46 36.6 - 50.3 %    Comment Comment Not Indicated. SAMPLES BEING HELD    Collection Time: 05/24/19 12:50 AM   Result Value Ref Range    SAMPLES BEING HELD 1SST,1RED,1BLUE     COMMENT        Add-on orders for these samples will be processed based on acceptable specimen integrity and analyte stability, which may vary by analyte. GLUCOSE, POC    Collection Time: 05/24/19 12:56 AM   Result Value Ref Range    Glucose (POC) 229 (H) 65 - 100 mg/dL    [facility] by [facility]        Xr Knee Lt 3 V    Result Date: 5/23/2019  EXAM: XR KNEE LT 3 V INDICATION: Status post fall with acute left knee pain. COMPARISON: 1/8/2019.  FINDINGS: Three views of the left knee demonstrate no fracture or other acute osseous or articular abnormality. There is a trace joint effusion. Significant degenerative changes are noted in all 3 compartments. Vascular calcification is noted. Trace joint effusion. Significant tricompartmental DJD. Procedures    ED EKG interpretation: 0109  Rhythm: normal sinus rhythm; and regular . Rate (approx.): 98; Axis: left axis deviation; ST/T wave: normal; poor baseline artifact secondary to tremor   Note written by Elaina Green, as dictated by Glenys Schroeder MD 1:10 AM      1:21 AM  Patient's results have been reviewed with them. Patient and/or family have verbally conveyed their understanding and agreement of the patient's signs, symptoms, diagnosis, treatment and prognosis and additionally agree to follow up as recommended or return to the Emergency Room should their condition change prior to follow-up. Discharge instructions have also been provided to the patient with some educational information regarding their diagnosis as well a list of reasons why they would want to return to the ER prior to their follow-up appointment should their condition change.

## 2019-05-24 NOTE — ED TRIAGE NOTES
Pt presents with cc of decreased ability to ambulate which has become more difficult over the past couple days, worst today. Pt reports he needs a left knee replacement, and normally ambulates with a cane. Pt reports he had a fall today after losing his balance after standing to urinate. Pt's family reports the pt has not been talking \"normally\" today. Pt reports he has diabetes, checked his BG approximately 45 minutes ago 312 for which he gave himself 12 units of novolog. Pt reports recently changing his diabetic medications.

## 2019-05-24 NOTE — DISCHARGE INSTRUCTIONS
Learning About High Blood Sugar  What is high blood sugar? Your body turns the food you eat into glucose (sugar), which it uses for energy. But if your body isn't able to use the sugar right away, it can build up in your blood and lead to high blood sugar. When the amount of sugar in your blood stays too high for too much of the time, you may have diabetes. Diabetes is a disease that can cause serious health problems. The good news is that lifestyle changes may help you get your blood sugar back to normal and avoid or delay diabetes. What causes high blood sugar? Sugar (glucose) can build up in your blood if you:  · Are overweight. · Have a family history of diabetes. · Take certain medicines, such as steroids. What are the symptoms? Having high blood sugar may not cause any symptoms at all. Or it may make you feel very thirsty or very hungry. You may also urinate more often than usual, have blurry vision, or lose weight without trying. How is high blood sugar treated? You can take steps to lower your blood sugar level if you understand what makes it get higher. Your doctor may want you to learn how to test your blood sugar level at home. Then you can see how illness, stress, or different kinds of food or medicine raise or lower your blood sugar level. Other tests may be needed to see if you have diabetes. How can you prevent high blood sugar? · Watch your weight. If you're overweight, losing just a small amount of weight may help. Reducing fat around your waist is most important. · Limit the amount of calories, sweets, and unhealthy fat you eat. Ask your doctor if a dietitian can help you. A registered dietitian can help you create meal plans that fit your lifestyle. · Get at least 30 minutes of exercise on most days of the week. Exercise helps control your blood sugar. It also helps you maintain a healthy weight. Walking is a good choice.  You also may want to do other activities, such as running, swimming, cycling, or playing tennis or team sports. · If your doctor prescribed medicines, take them exactly as prescribed. Call your doctor if you think you are having a problem with your medicine. You will get more details on the specific medicines your doctor prescribes. Follow-up care is a key part of your treatment and safety. Be sure to make and go to all appointments, and call your doctor if you are having problems. It's also a good idea to know your test results and keep a list of the medicines you take. Where can you learn more? Go to http://roscoeDatacraticjack.info/. Enter O108 in the search box to learn more about \"Learning About High Blood Sugar. \"  Current as of: July 25, 2018  Content Version: 11.9  © 2006-2018 [employer]. Care instructions adapted under license by [employer] (which disclaims liability or warranty for this information). If you have questions about a medical condition or this instruction, always ask your healthcare professional. [address] any warranty or liability for your use of this information. Arthritis: Care Instructions  Your Care Instructions  Arthritis, also called osteoarthritis, is a breakdown of the cartilage that cushions your joints. When the cartilage wears down, your bones rub against each other. This causes pain and stiffness. Many people have some arthritis as they age. Arthritis most often affects the joints of the spine, hands, hips, knees, or feet. You can take simple measures to protect your joints, ease your pain, and help you stay active. Follow-up care is a key part of your treatment and safety. Be sure to make and go to all appointments, and call your doctor if you are having problems. It's also a good idea to know your test results and keep a list of the medicines you take. How can you care for yourself at home? · Stay at a healthy weight.  Being overweight puts extra strain on your joints. · Talk to your doctor or physical therapist about exercises that will help ease joint pain. ? Stretch. You may enjoy gentle forms of yoga to help keep your joints and muscles flexible. ? Walk instead of jog. Other types of exercise that are less stressful on the joints include riding a bicycle, swimming, rey chi, or water exercise. ? Lift weights. Strong muscles help reduce stress on your joints. Stronger thigh muscles, for example, take some of the stress off of the knees and hips. Learn the right way to lift weights so you do not make joint pain worse. · Take your medicines exactly as prescribed. Call your doctor if you think you are having a problem with your medicine. · Take pain medicines exactly as directed. ? If the doctor gave you a prescription medicine for pain, take it as prescribed. ? If you are not taking a prescription pain medicine, ask your doctor if you can take an over-the-counter medicine. · Use a cane, crutch, walker, or another device if you need help to get around. These can help rest your joints. You also can use other things to make life easier, such as a higher toilet seat and padded handles on kitchen utensils. · Do not sit in low chairs, which can make it hard to get up. · Put heat or cold on your sore joints as needed. Use whichever helps you most. You also can take turns with hot and cold packs. ? Apply heat 2 or 3 times a day for 20 to 30 minutes--using a heating pad, hot shower, or hot pack--to relieve pain and stiffness. ? Put ice or a cold pack on your sore joint for 10 to 20 minutes at a time. Put a thin cloth between the ice and your skin. When should you call for help?   Call your doctor now or seek immediate medical care if:    · You have sudden swelling, warmth, or pain in any joint.     · You have joint pain and a fever or rash.     · You have such bad pain that you cannot use a joint.    Watch closely for changes in your health, and be sure to contact your doctor if:    · You have mild joint symptoms that continue even with more than 6 weeks of care at home.     · You have stomach pain or other problems with your medicine. Where can you learn more? Go to http://roscoe-jack.info/. Enter D021 in the search box to learn more about \"Arthritis: Care Instructions. \"  Current as of: Radha 10, 2018  Content Version: 11.9  © 5928-4733 [employer]. Care instructions adapted under license by [employer] (which disclaims liability or warranty for this information). If you have questions about a medical condition or this instruction, always ask your healthcare professional. [place] [address] any warranty or liability for your use of this information. Patient Education        Joint Pain: Care Instructions  Your Care Instructions    Many people have small aches and pains from overuse or injury to muscles and joints. Joint injuries often happen during sports or recreation, work tasks, or projects around the home. An overuse injury can happen when you put too much stress on a joint or when you do an activity that stresses the joint over and over, such as using the computer or rowing a boat. You can take action at home to help your muscles and joints get better. You should feel better in 1 to 2 weeks, but it can take 3 months or more to heal completely. Follow-up care is a key part of your treatment and safety. Be sure to make and go to all appointments, and call your doctor if you are having problems. It's also a good idea to know your test results and keep a list of the medicines you take. How can you care for yourself at home? · Do not put weight on the injured joint for at least a day or two. · For the first day or two after an injury, do not take hot showers or baths, and do not use hot packs. The heat could make swelling worse.   · Put ice or a cold pack on the sore joint for 10 to 20 minutes at a time. Try to do this every 1 to 2 hours for the next 3 days (when you are awake) or until the swelling goes down. Put a thin cloth between the ice and your skin. · Wrap the injury in an elastic bandage. Do not wrap it too tightly because this can cause more swelling. · Prop up the sore joint on a pillow when you ice it or anytime you sit or lie down during the next 3 days. Try to keep it above the level of your heart. This will help reduce swelling. · Take an over-the-counter pain medicine, such as acetaminophen (Tylenol), ibuprofen (Advil, Motrin), or naproxen (Aleve). Read and follow all instructions on the label. · After 1 or 2 days of rest, begin moving the joint gently. While the joint is still healing, you can begin to exercise using activities that do not strain or hurt the painful joint. When should you call for help? Call your doctor now or seek immediate medical care if:    · You have signs of infection, such as:  ? Increased pain, swelling, warmth, and redness. ? Red streaks leading from the joint. ? A fever.    Watch closely for changes in your health, and be sure to contact your doctor if:    · Your movement or symptoms are not getting better after 1 to 2 weeks of home treatment. Where can you learn more? Go to http://roscoe-jack.info/. Enter P205 in the search box to learn more about \"Joint Pain: Care Instructions. \"  Current as of: September 20, 2018  Content Version: 11.9  © 9667-1656 [employer]. Care instructions adapted under license by [employer] (which disclaims liability or warranty for this information). If you have questions about a medical condition or this instruction, always ask your healthcare professional. [place] [address] any warranty or liability for your use of this information.

## 2019-07-01 ENCOUNTER — HOSPITAL ENCOUNTER (OUTPATIENT)
Dept: PREADMISSION TESTING | Age: 69
Discharge: HOME | End: 2019-07-01
Payer: MEDICARE

## 2019-07-01 VITALS
HEIGHT: 71 IN | RESPIRATION RATE: 16 BRPM | DIASTOLIC BLOOD PRESSURE: 77 MMHG | TEMPERATURE: 97.9 F | BODY MASS INDEX: 32.31 KG/M2 | WEIGHT: 230.8 LBS | SYSTOLIC BLOOD PRESSURE: 131 MMHG | HEART RATE: 84 BPM

## 2019-07-01 LAB
ABO + RH BLD: NORMAL
ANION GAP SERPL CALC-SCNC: 9 MMOL/L (ref 5–15)
APPEARANCE UR: CLEAR
BACTERIA URNS QL MICRO: NEGATIVE /HPF
BILIRUB UR QL: NEGATIVE
BLOOD GROUP ANTIBODIES SERPL: NORMAL
BUN SERPL-MCNC: 23 MG/DL (ref 6–20)
BUN/CREAT SERPL: 20 (ref 12–20)
CALCIUM SERPL-MCNC: 8.8 MG/DL (ref 8.5–10.1)
CHLORIDE SERPL-SCNC: 102 MMOL/L (ref 97–108)
CO2 SERPL-SCNC: 26 MMOL/L (ref 21–32)
COLOR UR: ABNORMAL
CREAT SERPL-MCNC: 1.13 MG/DL (ref 0.7–1.3)
EPITH CASTS URNS QL MICRO: ABNORMAL /LPF
ERYTHROCYTE [DISTWIDTH] IN BLOOD BY AUTOMATED COUNT: 14.3 % (ref 11.5–14.5)
EST. AVERAGE GLUCOSE BLD GHB EST-MCNC: 186 MG/DL
GLUCOSE SERPL-MCNC: 129 MG/DL (ref 65–100)
GLUCOSE UR STRIP.AUTO-MCNC: NEGATIVE MG/DL
HBA1C MFR BLD: 8.1 % (ref 4.2–6.3)
HCT VFR BLD AUTO: 44.1 % (ref 36.6–50.3)
HGB BLD-MCNC: 14.2 G/DL (ref 12.1–17)
HGB UR QL STRIP: NEGATIVE
HYALINE CASTS URNS QL MICRO: ABNORMAL /LPF (ref 0–5)
INR PPP: 1 (ref 0.9–1.1)
KETONES UR QL STRIP.AUTO: NEGATIVE MG/DL
LEUKOCYTE ESTERASE UR QL STRIP.AUTO: NEGATIVE
MCH RBC QN AUTO: 30.2 PG (ref 26–34)
MCHC RBC AUTO-ENTMCNC: 32.2 G/DL (ref 30–36.5)
MCV RBC AUTO: 93.8 FL (ref 80–99)
NITRITE UR QL STRIP.AUTO: NEGATIVE
NRBC # BLD: 0 K/UL (ref 0–0.01)
NRBC BLD-RTO: 0 PER 100 WBC
PH UR STRIP: 5 [PH] (ref 5–8)
PLATELET # BLD AUTO: 187 K/UL (ref 150–400)
PMV BLD AUTO: 12.1 FL (ref 8.9–12.9)
POTASSIUM SERPL-SCNC: 4.5 MMOL/L (ref 3.5–5.1)
PROT UR STRIP-MCNC: NEGATIVE MG/DL
PROTHROMBIN TIME: 10.3 SEC (ref 9–11.1)
RBC # BLD AUTO: 4.7 M/UL (ref 4.1–5.7)
RBC #/AREA URNS HPF: ABNORMAL /HPF (ref 0–5)
SODIUM SERPL-SCNC: 137 MMOL/L (ref 136–145)
SP GR UR REFRACTOMETRY: >1.03 (ref 1–1.03)
SPECIMEN EXP DATE BLD: NORMAL
UA: UC IF INDICATED,UAUC: ABNORMAL
UROBILINOGEN UR QL STRIP.AUTO: 0.2 EU/DL (ref 0.2–1)
WBC # BLD AUTO: 7 K/UL (ref 4.1–11.1)
WBC URNS QL MICRO: ABNORMAL /HPF (ref 0–4)

## 2019-07-01 PROCEDURE — 80048 BASIC METABOLIC PNL TOTAL CA: CPT

## 2019-07-01 PROCEDURE — 36415 COLL VENOUS BLD VENIPUNCTURE: CPT

## 2019-07-01 PROCEDURE — 85027 COMPLETE CBC AUTOMATED: CPT

## 2019-07-01 PROCEDURE — 81001 URINALYSIS AUTO W/SCOPE: CPT

## 2019-07-01 PROCEDURE — 86900 BLOOD TYPING SEROLOGIC ABO: CPT

## 2019-07-01 PROCEDURE — 83036 HEMOGLOBIN GLYCOSYLATED A1C: CPT

## 2019-07-01 PROCEDURE — 85610 PROTHROMBIN TIME: CPT

## 2019-07-01 RX ORDER — BISMUTH SUBSALICYLATE 262 MG
1 TABLET,CHEWABLE ORAL DAILY
Status: ON HOLD | COMMUNITY
End: 2021-11-01

## 2019-07-01 RX ORDER — OXYCODONE HYDROCHLORIDE 10 MG/1
10 TABLET ORAL
COMMUNITY
End: 2019-08-09

## 2019-07-01 RX ORDER — PIOGLITAZONEHYDROCHLORIDE 15 MG/1
15 TABLET ORAL DAILY
COMMUNITY

## 2019-07-01 RX ORDER — CLOPIDOGREL BISULFATE 75 MG/1
75 TABLET ORAL DAILY
Status: ON HOLD | COMMUNITY
End: 2021-11-01

## 2019-07-01 RX ORDER — HYDROCHLOROTHIAZIDE 12.5 MG/1
12.5 TABLET ORAL
Status: ON HOLD | COMMUNITY
End: 2021-11-01

## 2019-07-01 RX ORDER — ASPIRIN 81 MG/1
81 TABLET ORAL DAILY
COMMUNITY

## 2019-07-01 NOTE — PERIOP NOTES
DO NOT EAT ANYTHING AFTER MIDNIGHT, except as instructed THE NIGHT BEFORE SURGERY. PT GIVEN OPPORTUNITY TO ASK ADDITIONAL QUESTIONS. PRE-OPERATIVE INSTRUCTIONS REVIEWED WITH PATIENT. PATIENT GIVEN 2-BOTTLES OF 4% CHG SOAP. INSTRUCTIONS REVIEWED ON USE OF CHG SOAP. PATIENT GIVEN SSI INFECTION FAQ SHEET. MRSA / MSSA TREATMENT INSTRUCTION SHEET GIVEN WITH AN EXPLANATION TO PATIENT THAT THEY WILL BE NOTIFIED IF TREATMENT INSTRUCTIONS NEED TO BE INITIATED. PATIENT WAS GIVEN THE OPPORTUNITY TO ASK QUESTIONS ON THE INFORMATION PROVIDED.     RELEASE OF INFORMATION FAXED TO DR Christina Asif OFFICE [phone]

## 2019-07-02 LAB
BACTERIA SPEC CULT: NORMAL
BACTERIA SPEC CULT: NORMAL
SERVICE CMNT-IMP: NORMAL

## 2019-07-02 NOTE — PERIOP NOTES
HA1c: 8.1. DTC consult PLACED PER ORTHO GUIDELINES.    1004: Faxed PAT testing reports (and fax confirmation received) to Dr. Pickens Carry office. Called at [phone] on 7/2/19 (left message with Jordi Gomez) RE: abnormal HA1c.

## 2019-07-18 ENCOUNTER — APPOINTMENT (OUTPATIENT)
Dept: GENERAL RADIOLOGY | Age: 69
End: 2019-07-18
Attending: EMERGENCY MEDICINE
Payer: MEDICARE

## 2019-07-18 ENCOUNTER — HOSPITAL ENCOUNTER (EMERGENCY)
Age: 69
Discharge: HOME | End: 2019-07-19
Attending: EMERGENCY MEDICINE
Payer: MEDICARE

## 2019-07-18 DIAGNOSIS — S89.92XA LEFT KNEE INJURY, INITIAL ENCOUNTER: Primary | ICD-10-CM

## 2019-07-18 DIAGNOSIS — S83.105A ACUTE TRAUMATIC INTERNAL DERANGEMENT OF LEFT KNEE, INITIAL ENCOUNTER: ICD-10-CM

## 2019-07-18 PROCEDURE — L1830 KO IMMOB CANVAS LONG PRE OTS: HCPCS

## 2019-07-18 PROCEDURE — 99285 EMERGENCY DEPT VISIT HI MDM: CPT

## 2019-07-18 PROCEDURE — 74011250637 HC RX REV CODE- 250/637: Performed by: EMERGENCY MEDICINE

## 2019-07-18 PROCEDURE — 73562 X-RAY EXAM OF KNEE 3: CPT

## 2019-07-18 RX ORDER — OXYCODONE AND ACETAMINOPHEN 5; 325 MG/1; MG/1
2 TABLET ORAL
Status: COMPLETED | OUTPATIENT
Start: 2019-07-18 | End: 2019-07-18

## 2019-07-18 RX ADMIN — OXYCODONE HYDROCHLORIDE AND ACETAMINOPHEN 2 TABLET: 5; 325 TABLET ORAL at 22:54

## 2019-07-19 VITALS
HEART RATE: 87 BPM | OXYGEN SATURATION: 100 % | WEIGHT: 218 LBS | TEMPERATURE: 98.2 F | DIASTOLIC BLOOD PRESSURE: 71 MMHG | HEIGHT: 71 IN | SYSTOLIC BLOOD PRESSURE: 126 MMHG | BODY MASS INDEX: 30.52 KG/M2 | RESPIRATION RATE: 17 BRPM

## 2019-07-19 NOTE — ED PROVIDER NOTES
77 y.o. male with past medical history significant for chronic pain, DM who presents from home via EMS with chief complaint of acute on chronic left knee arjun s/p GLF. Pt states he was getting up from sitting in a chair at 2130 tonight, when he turned and twisted his left knee, causing him to fall. He reports landing directly on the left knee. He reports worsening swelling and a decreased range of motion secondary to pain of the knee. Pt further reports chronic knee pain at baseline and was previous scheduled for a bilateral knee replacement with Orthopedist Dr. Gal Jaramillo on July 8th, but the surgery was held given the pt was to have an  A1C of 8.1 during pre op testing. He notes his knee pain is exacerbated with movement of the knee, but he is \"fine\" sitting still. He is currently on Plavix. Pt specifically denies any head trauma, LOC, back pain, neck pain. There are no other acute medical concerns at this time. PMHx: Significant for DM, prostate cancer, Von Willebrand disease, Factor VIII  PSHx: Significant for appendectomy, prostatectomy, hernia repair, orthopedic, colonoscopy  Social Hx: negative tobacco use, negative EtOH use, negative Illicit Drug use    PCP: Gabriel Em MD    Note written by Elaina Ambrose, as dictated by Sathish Valadez MD 11:12 PM    The history is provided by the patient and the EMS personnel. No  was used.         Past Medical History:   Diagnosis Date    Arthritis     CAD (coronary artery disease)     Cancer ([facility] [address]     Prostate    Diabetes ([facility] [address] [facility]     Ill-defined condition     ISSUES WITH CLOTTING FACTOR VIII    Psychiatric disorder     DEPRESSION    Von Willebrand disease ([facility] [address]        Past Surgical History:   Procedure Laterality Date    ABDOMEN SURGERY PROC UNLISTED  1954    RIGHT CONGENITAL INGUINAL HERNIA REPAIR    ABDOMEN SURGERY PROC UNLISTED  1991    LEFT INGUINAL HERNIA REPAIR    ABDOMEN SURGERY [address] UNLISTED      SIGMOIDOSCOPY-FOR HEMMORIODS.  CABG, ARTERY-VEIN, FOUR      HX APPENDECTOMY  1979    HX ARTERIAL BYPASS      HX CATARACT REMOVAL Bilateral 2016, 2011    HX GI  2017    COLONOSCOPY WITH NO POLYPS    HX HEENT  1949    T&A    HX HEENT  1972    EXTRACTION OF WISDOM TEETH X 4 (2 SURGERIES 2 TEETH AT A TIME)    HX ORTHOPAEDIC  02/2006    RIGHT HAMMER TOES X 4 FIXED    HX OTHER SURGICAL  1963    RIGHT INNER THIGH CEBACIOUS CYST REMOVED    HX PROSTATECTOMY  06/2012    RADICAL PROSTATECTOMY         Family History:   Problem Relation Age of Onset    Cancer Mother         ovarian?     Cancer Father         PROSTATE CA, & NON MALIGNANT TUMOR SURGERY THAT HE DID NOT RECOVER FROM & HAD RENAL SURGERY    No Known Problems Sister     No Known Problems Brother     No Known Problems Daughter     No Known Problems Son        Social History     Socioeconomic History    Marital status: LIFE PARTNER     Spouse name: Not on file    Number of children: Not on file    Years of education: Not on file    Highest education level: Not on file   Occupational History    Not on file   Social Needs    Financial resource strain: Not on file    Food insecurity:     Worry: Not on file     Inability: Not on file    Transportation needs:     Medical: Not on file     Non-medical: Not on file   Tobacco Use    Smoking status: Never Smoker    Smokeless tobacco: Never Used    Tobacco comment: h/o PIPE SMOKING IN 1970's, STOPPED   Substance and Sexual Activity    Alcohol use: No    Drug use: No    Sexual activity: Not Currently   Lifestyle    Physical activity:     Days per week: Not on file     Minutes per session: Not on file    Stress: Not on file   Relationships    Social connections:     Talks on phone: Not on file     Gets together: Not on file     Attends [employer] service: Not on file     Active member of club or organization: Not on file     Attends meetings of clubs or organizations: Not on file     Relationship status: Not on file  Intimate partner violence:     Fear of current or ex partner: Not on file     Emotionally abused: Not on file     Physically abused: Not on file     Forced sexual activity: Not on file   Other Topics Concern    Not on file   Social History Narrative    Not on file         ALLERGIES: Ddavp [desmopressin]    Review of Systems   Musculoskeletal: Positive for arthralgias (left knee pain) and joint swelling. Negative for back pain and neck pain. Neurological: Negative for syncope and headaches. All other systems reviewed and are negative. Vitals:    07/18/19 2239   BP: 123/67   Pulse: 94   Resp: 18   Temp: 98.5 °F (36.9 °C)   SpO2: 95%   Weight: 98.9 kg (218 lb)   Height: 5' 11\" (1.803 m)            Physical Exam   Nursing note and vitals reviewed. CONSTITUTIONAL: Well-appearing; well-nourished; in no apparent distress  HEAD: Normocephalic; atraumatic  EYES: PERRL; EOM intact; conjunctiva and sclera are clear bilaterally. ENT: No rhinorrhea; normal pharynx with no tonsillar hypertrophy; mucous membranes pink/moist, no erythema, no exudate. NECK: Supple; non-tender; no cervical lymphadenopathy  CARD: Normal S1, S2; no murmurs, rubs, or gallops. Regular rate and rhythm. RESP: Normal respiratory effort; breath sounds clear and equal bilaterally; no wheezes, rhonchi, or rales. ABD: Normal bowel sounds; non-distended; non-tender; no palpable organomegaly, no masses, no bruits. Back Exam: Normal inspection; no vertebral point tenderness, no CVA tenderness. Normal range of motion. EXT: Normal ROM in all four extremities; non-tender to palpation; no swelling or deformity; distal pulses are normal, no edema. SKIN: Warm; dry; no rash.   NEURO:Alert and oriented x 3, coherent, NELIA-XII grossly intact, sensory and motor are non-focal.        MDM  Number of Diagnoses or Management Options  Acute traumatic internal derangement of left knee, initial encounter:   Left knee injury, initial encounter:   Diagnosis management comments: Assessment: Left knee injury suspect internal ligamentous injury/rupture-rule out fracture      Plan: Education, reassurance, symptomatic treatment/x-ray of the left knee/knee immobilizer with crutches/ Monitor and Reevaluate. Amount and/or Complexity of Data Reviewed  Clinical lab tests: ordered and reviewed  Tests in the radiology section of CPT®: ordered and reviewed  Tests in the medicine section of CPT®: reviewed and ordered  Discussion of test results with the performing providers: yes  Decide to obtain previous medical records or to obtain history from someone other than the patient: yes  Obtain history from someone other than the patient: yes  Review and summarize past medical records: yes  Discuss the patient with other providers: yes  Independent visualization of images, tracings, or specimens: yes    Risk of Complications, Morbidity, and/or Mortality  Presenting problems: moderate  Diagnostic procedures: moderate  Management options: moderate           Procedures     XRAY INTERPRETATION (ED MD)  Xray of Left knee shows no fracture. No subluxation/dislocation. No bony abnormality. Betsey Shah MD 11:33 PM        Progress Note:   Pt has been reexamined by Jonah Cerna MD. Pt is feeling much better. Symptoms have improved. All available results have been reviewed with pt and any available family. Pt understands sx, dx, and tx in ED. Care plan has been outlined and questions have been answered. Pt is ready to go home. Will send home on Left knee injury instruction. RICE education. Outpatient referral with Orthopedist as needed. Written by Jonah Cerna MD,11:33 PM    .   .

## 2019-07-19 NOTE — ED NOTES
Dr. Tamela Rizzo gave and reviewed discharge instructions with the patient and caregiver. The patient and caregiver verbalized understanding. The patient and caregiver was given opportunity for questions. Patient discharged in stable condition to the waiting room via wheelchair with daughter.

## 2019-07-19 NOTE — PROGRESS NOTES
7/19/2019  9:37 AM  Case management note    Dr. Tevin Mcdowell ask me to reach out to patient about wheelchair. I called patient to let him know that Medicare will not pay for a wheelchair unless documentation that he can not safely us a cane, walker or crutches. He had decided that he could not use a wheelchair in home. We discussed about his upcoming surgery  Will continue to follow.   Erick Roberto, [address]

## 2019-07-19 NOTE — DISCHARGE INSTRUCTIONS

## 2019-07-19 NOTE — ED TRIAGE NOTES
Pt arrives via EMS. Per pt he had a GLF when he transfer himself from chair to bed around 2130. Twisted left knee and heard \"pop\" when he get on the floor. Pt was schedule to have bilateral knee replacemnet surgery on 7/8/19 however surgery was hold off d/t A1C 8.1 during pre op testing. Dr. Negar Coburn at bedside for initial evaluation.

## 2019-07-24 NOTE — H&P
PCP: Ephraim Haddad MD      Problem List      None        Subjective:          Patient Active Problem List   Diagnosis    Type 2 diabetes mellitus without complication    Essential hypertension    Coagulopathy    High cholesterol      Chief Complaint: Pain of the Left Knee     HPI: Parker Osorio is a 77 y.o. male who returns for follow-up of his left knee pain. He has end-stage osteoarthritis of the left knee with varus deformity and bone-on-bone osteoarthritis of the left knee with significant spurring medially. He states that his left knee pain has been increasing over the past few weeks and that he can no longer perform his normal daily activities without significant pain. He complains of left knee pain at night that keeps him awake. He also reports instability in his knee that causes him trouble walking. He is ambulating with a rolling walker today. We have injected his left knee with cortisone in the past with decreasing benefit. He also reports that his blood sugar levels are under control. He wishes to proceed with total knee replacement surgery.      Of note, patient is still on plavix.      Objective:      There were no vitals filed for this visit. Height: 6'       Wt Readings from Last 3 Encounters:   06/13/19 222 lb   01/09/19 215 lb   09/06/18 215 lb      Body mass index is 30.11 kg/m².     Musculoskeletal : He has good range of motion of the left knee with pain on motion. Ligaments intact. His left knee has a moderate effusion. He has a positive patellofemoral grind. He has varus deformity with severe tenderness over the medial compartment with diffuse crepitus. Strong pedal pulses. No pedal edema. Skin healthy and intact. No apparent atrophy.      Radiographs:             No imaging obtained    Assessment:      1. Primary osteoarthritis of left knee    2.  Status post total replacement of right hip       Plan:      I reviewed previous x-rays of the left knee 05/29/2018 with the patient which shows end-stage osteoarthritis with varus deformity and bone-on-bone osteoarthritis of the medial compartment. We discussed treatment options. He has failed conservative management. We discussed total knee replacement surgery at length with pt including expected outcomes and post-op recovery as well as risks and complications, specifically DVT, PE, infection, and nerve injury. After much discussion, pt desires to proceed with surgery. We will schedule surgery at pt's convenience. He will f/u for scheduled surgery.          Orders Placed This Encounter    BMI >=25 PATIENT INSTRUCTIONS & EDUCATION    meloxicam (MOBIC) 15 MG tablet      Return for Scheduled surgery.      Medical documentation was entered by me, Sendy Rosado, Medical Scribe for Dr. Kendal Dunaway. 2019     I, Bryon Leung MD, personally, performed the services described in this documentation, as scribed in my presence, and it is both accurate and complete. Date of Surgery Update:  Jelena Sierra was seen and examined. Past Medical History:   Diagnosis Date    Arthritis     CAD (coronary artery disease) 2018    CABG, [place]    Cancer ([facility] [address]     Prostate    Chronic pain     LEFT KNEE    Diabetes ([facility] [address]     NIDDM    Ill-defined condition     ISSUES WITH CLOTTING FACTOR VIII    Psychiatric disorder     DEPRESSION    Von Willebrand disease ([facility] [address]      Prior to Admission Medications   Prescriptions Last Dose Informant Patient Reported? Taking? OTHER   Yes Yes   Sig: Take 1 Tab by mouth daily. CATECOSTIM (HERBAL SUPPLEMENT)   TESTOSTERONE IM   Yes Yes   Si Dose by IntraMUSCular route every twenty-eight (28) days. aspirin delayed-release 81 mg tablet   Yes Yes   Sig: Take 81 mg by mouth daily. clopidogrel (PLAVIX) 75 mg tab 2019  Yes No   Sig: Take 75 mg by mouth daily. diazePAM (VALIUM) 5 mg tablet   Yes Yes   Sig: Take 5 mg by mouth as needed for Anxiety.    fish,flaxseed oil-e.prim-bcurr (FISH, FLAX Maurice Bottoms,) 400-400-200 mg cap   Yes Yes   Sig: Take 2 Caps by mouth daily. hydroCHLOROthiazide (HYDRODIURIL) 12.5 mg tablet   Yes Yes   Sig: Take 12.5 mg by mouth daily as needed. insulin degludec (TRESIBA U-100 INSULIN SC)   Yes Yes   Si Units by SubCUTAneous route every evening. irbesartan (AVAPRO) 150 mg tablet   Yes Yes   Sig: Take 150 mg by mouth daily. IN AM   metFORMIN (GLUCOPHAGE) 500 mg tablet   Yes Yes   Sig: Take 500 mg by mouth two (2) times daily (with meals). multivitamin (ONE A DAY) tablet   Yes Yes   Sig: Take 1 Tab by mouth daily. oxyCODONE IR (ROXICODONE) 10 mg tab immediate release tablet   Yes Yes   Sig: Take 10 mg by mouth every four (4) hours as needed. PT MAY TAKE 1/2 TO 1 TAB AS NEEDED   pioglitazone (ACTOS) 15 mg tablet   Yes Yes   Sig: Take 15 mg by mouth daily. potassium 99 mg tablet   Yes Yes   Sig: Take 99 mg by mouth daily. PT TAKES 2 TABS DAILY FOR LEG CRAMPS   simvastatin (ZOCOR) 40 mg tablet   Yes Yes   Sig: Take  by mouth nightly. Facility-Administered Medications: None      Allergy to:Ddavp [desmopressin]  Physical Examination: General appearance - alert, well appearing, and in no distress  Chest - clear to auscultation, no wheezes, rales or rhonchi, symmetric air entry  Heart - normal rate and regular rhythm  Abdomen - soft, nontender, nondistended, no masses or organomegaly  History and physical has been reviewed. The patient has been examined.  There have been no significant clinical changes since the completion of the originally dated History and Physical.    Signed By: Marta Fishman PA-C     2019 10:32 AM

## 2019-08-05 ENCOUNTER — ANESTHESIA EVENT (OUTPATIENT)
Dept: SURGERY | Age: 69
DRG: 470 | End: 2019-08-05
Payer: MEDICARE

## 2019-08-05 ENCOUNTER — ANESTHESIA (OUTPATIENT)
Dept: SURGERY | Age: 69
DRG: 470 | End: 2019-08-05
Payer: MEDICARE

## 2019-08-05 ENCOUNTER — HOSPITAL ENCOUNTER (INPATIENT)
Age: 69
LOS: 4 days | Discharge: NURSING, REHAB OR LONG-TERM CARE | DRG: 470 | End: 2019-08-09
Attending: ORTHOPAEDIC SURGERY | Admitting: ORTHOPAEDIC SURGERY
Payer: MEDICARE

## 2019-08-05 DIAGNOSIS — M17.12 PRIMARY OSTEOARTHRITIS OF LEFT KNEE: Primary | ICD-10-CM

## 2019-08-05 LAB
ABO + RH BLD: NORMAL
BLOOD GROUP ANTIBODIES SERPL: NORMAL
GLUCOSE BLD STRIP.AUTO-MCNC: 115 MG/DL (ref 65–100)
GLUCOSE BLD STRIP.AUTO-MCNC: 146 MG/DL (ref 65–100)
GLUCOSE BLD STRIP.AUTO-MCNC: 92 MG/DL (ref 65–100)
SERVICE CMNT-IMP: ABNORMAL
SERVICE CMNT-IMP: ABNORMAL
SERVICE CMNT-IMP: NORMAL
SPECIMEN EXP DATE BLD: NORMAL

## 2019-08-05 PROCEDURE — 36415 COLL VENOUS BLD VENIPUNCTURE: CPT

## 2019-08-05 PROCEDURE — 77030008467 HC STPLR SKN COVD -B: Performed by: ORTHOPAEDIC SURGERY

## 2019-08-05 PROCEDURE — 77030018836 HC SOL IRR NACL ICUM -A: Performed by: ORTHOPAEDIC SURGERY

## 2019-08-05 PROCEDURE — 77030027138 HC INCENT SPIROMETER -A

## 2019-08-05 PROCEDURE — 74011000250 HC RX REV CODE- 250: Performed by: ORTHOPAEDIC SURGERY

## 2019-08-05 PROCEDURE — C1776 JOINT DEVICE (IMPLANTABLE): HCPCS | Performed by: ORTHOPAEDIC SURGERY

## 2019-08-05 PROCEDURE — 82962 GLUCOSE BLOOD TEST: CPT

## 2019-08-05 PROCEDURE — 77030031139 HC SUT VCRL2 J&J -A: Performed by: ORTHOPAEDIC SURGERY

## 2019-08-05 PROCEDURE — 0SRD0J9 REPLACEMENT OF LEFT KNEE JOINT WITH SYNTHETIC SUBSTITUTE, CEMENTED, OPEN APPROACH: ICD-10-PCS | Performed by: ORTHOPAEDIC SURGERY

## 2019-08-05 PROCEDURE — 65270000029 HC RM PRIVATE

## 2019-08-05 PROCEDURE — 74011250637 HC RX REV CODE- 250/637: Performed by: PHYSICIAN ASSISTANT

## 2019-08-05 PROCEDURE — 74011250636 HC RX REV CODE- 250/636: Performed by: PHYSICIAN ASSISTANT

## 2019-08-05 PROCEDURE — 77030003601 HC NDL NRV BLK BBMI -A

## 2019-08-05 PROCEDURE — 77030013079 HC BLNKT BAIR HGGR 3M -A: Performed by: ANESTHESIOLOGY

## 2019-08-05 PROCEDURE — 77030014077 HC TOWER MX CEM J&J -C: Performed by: ORTHOPAEDIC SURGERY

## 2019-08-05 PROCEDURE — 76010000162 HC OR TIME 1.5 TO 2 HR INTENSV-TIER 1: Performed by: ORTHOPAEDIC SURGERY

## 2019-08-05 PROCEDURE — 74011000250 HC RX REV CODE- 250: Performed by: PHYSICIAN ASSISTANT

## 2019-08-05 PROCEDURE — 74011000250 HC RX REV CODE- 250: Performed by: NURSE ANESTHETIST, CERTIFIED REGISTERED

## 2019-08-05 PROCEDURE — 76060000034 HC ANESTHESIA 1.5 TO 2 HR: Performed by: ORTHOPAEDIC SURGERY

## 2019-08-05 PROCEDURE — 77030035236 HC SUT PDS STRATFX BARB J&J -B: Performed by: ORTHOPAEDIC SURGERY

## 2019-08-05 PROCEDURE — 74011250636 HC RX REV CODE- 250/636: Performed by: NURSE ANESTHETIST, CERTIFIED REGISTERED

## 2019-08-05 PROCEDURE — 86900 BLOOD TYPING SEROLOGIC ABO: CPT

## 2019-08-05 PROCEDURE — 74011250636 HC RX REV CODE- 250/636: Performed by: ORTHOPAEDIC SURGERY

## 2019-08-05 PROCEDURE — 77030034850: Performed by: ORTHOPAEDIC SURGERY

## 2019-08-05 PROCEDURE — 74011250636 HC RX REV CODE- 250/636: Performed by: ANESTHESIOLOGY

## 2019-08-05 PROCEDURE — 76210000016 HC OR PH I REC 1 TO 1.5 HR: Performed by: ORTHOPAEDIC SURGERY

## 2019-08-05 PROCEDURE — 77030006822 HC BLD SAW SAG BRSM -B: Performed by: ORTHOPAEDIC SURGERY

## 2019-08-05 PROCEDURE — 77030008684 HC TU ET CUF COVD -B: Performed by: ANESTHESIOLOGY

## 2019-08-05 PROCEDURE — 77030018846 HC SOL IRR STRL H20 ICUM -A: Performed by: ORTHOPAEDIC SURGERY

## 2019-08-05 PROCEDURE — C1713 ANCHOR/SCREW BN/BN,TIS/BN: HCPCS | Performed by: ORTHOPAEDIC SURGERY

## 2019-08-05 PROCEDURE — 77030020782 HC GWN BAIR PAWS FLX 3M -B

## 2019-08-05 PROCEDURE — 77030008477 HC STYL SATN SLP COVD -A: Performed by: ANESTHESIOLOGY

## 2019-08-05 PROCEDURE — 77030034696 HC CATH URETH FOL 2W BARD -A: Performed by: ORTHOPAEDIC SURGERY

## 2019-08-05 PROCEDURE — 77030011640 HC PAD GRND REM COVD -A: Performed by: ORTHOPAEDIC SURGERY

## 2019-08-05 PROCEDURE — 77030028907 HC WRP KNEE WO BGS SOLM -B

## 2019-08-05 DEVICE — KIT TKR CEM VIT E SURF AND INSTRMT: Type: IMPLANTABLE DEVICE | Site: KNEE | Status: FUNCTIONAL

## 2019-08-05 DEVICE — INSERT ALL POLY PAT PLY 35MM -- PERSONA: Type: IMPLANTABLE DEVICE | Site: KNEE | Status: FUNCTIONAL

## 2019-08-05 DEVICE — SMARTSET GHV GENTAMICIN HIGH VISCOSITY BONE CEMENT 40G
Type: IMPLANTABLE DEVICE | Site: KNEE | Status: FUNCTIONAL
Brand: SMARTSET

## 2019-08-05 DEVICE — IMPLANTABLE DEVICE
Type: IMPLANTABLE DEVICE | Site: KNEE | Status: FUNCTIONAL
Brand: PERSONA®

## 2019-08-05 DEVICE — IMPLANTABLE DEVICE: Type: IMPLANTABLE DEVICE | Site: KNEE | Status: FUNCTIONAL

## 2019-08-05 RX ORDER — MORPHINE SULFATE 10 MG/ML
2 INJECTION, SOLUTION INTRAMUSCULAR; INTRAVENOUS
Status: DISCONTINUED | OUTPATIENT
Start: 2019-08-05 | End: 2019-08-05 | Stop reason: HOSPADM

## 2019-08-05 RX ORDER — FACIAL-BODY WIPES
10 EACH TOPICAL DAILY PRN
Status: DISCONTINUED | OUTPATIENT
Start: 2019-08-07 | End: 2019-08-09 | Stop reason: HOSPADM

## 2019-08-05 RX ORDER — ONDANSETRON 4 MG/1
4 TABLET, ORALLY DISINTEGRATING ORAL
Status: DISCONTINUED | OUTPATIENT
Start: 2019-08-05 | End: 2019-08-09 | Stop reason: HOSPADM

## 2019-08-05 RX ORDER — OXYCODONE HYDROCHLORIDE 5 MG/1
10 TABLET ORAL
Status: DISCONTINUED | OUTPATIENT
Start: 2019-08-05 | End: 2019-08-09 | Stop reason: HOSPADM

## 2019-08-05 RX ORDER — OXYCODONE AND ACETAMINOPHEN 5; 325 MG/1; MG/1
1 TABLET ORAL AS NEEDED
Status: DISCONTINUED | OUTPATIENT
Start: 2019-08-05 | End: 2019-08-05 | Stop reason: HOSPADM

## 2019-08-05 RX ORDER — OXYCODONE HYDROCHLORIDE 5 MG/1
5 TABLET ORAL
Status: DISCONTINUED | OUTPATIENT
Start: 2019-08-05 | End: 2019-08-09 | Stop reason: HOSPADM

## 2019-08-05 RX ORDER — ONDANSETRON 2 MG/ML
INJECTION INTRAMUSCULAR; INTRAVENOUS AS NEEDED
Status: DISCONTINUED | OUTPATIENT
Start: 2019-08-05 | End: 2019-08-05 | Stop reason: HOSPADM

## 2019-08-05 RX ORDER — MAGNESIUM SULFATE 100 %
4 CRYSTALS MISCELLANEOUS AS NEEDED
Status: DISCONTINUED | OUTPATIENT
Start: 2019-08-05 | End: 2019-08-09 | Stop reason: HOSPADM

## 2019-08-05 RX ORDER — ACETAMINOPHEN 500 MG
1000 TABLET ORAL ONCE
Status: COMPLETED | OUTPATIENT
Start: 2019-08-05 | End: 2019-08-05

## 2019-08-05 RX ORDER — MIDAZOLAM HYDROCHLORIDE 1 MG/ML
1 INJECTION, SOLUTION INTRAMUSCULAR; INTRAVENOUS AS NEEDED
Status: DISCONTINUED | OUTPATIENT
Start: 2019-08-05 | End: 2019-08-05 | Stop reason: HOSPADM

## 2019-08-05 RX ORDER — SODIUM CHLORIDE 9 MG/ML
50 INJECTION, SOLUTION INTRAVENOUS CONTINUOUS
Status: DISCONTINUED | OUTPATIENT
Start: 2019-08-05 | End: 2019-08-05 | Stop reason: HOSPADM

## 2019-08-05 RX ORDER — INSULIN LISPRO 100 [IU]/ML
INJECTION, SOLUTION INTRAVENOUS; SUBCUTANEOUS
Status: DISCONTINUED | OUTPATIENT
Start: 2019-08-05 | End: 2019-08-09 | Stop reason: HOSPADM

## 2019-08-05 RX ORDER — CEFAZOLIN SODIUM/WATER 2 G/20 ML
2 SYRINGE (ML) INTRAVENOUS EVERY 8 HOURS
Status: COMPLETED | OUTPATIENT
Start: 2019-08-05 | End: 2019-08-06

## 2019-08-05 RX ORDER — ACETAMINOPHEN 500 MG
1000 TABLET ORAL EVERY 6 HOURS
Status: DISCONTINUED | OUTPATIENT
Start: 2019-08-05 | End: 2019-08-09 | Stop reason: HOSPADM

## 2019-08-05 RX ORDER — MORPHINE SULFATE 2 MG/ML
2 INJECTION, SOLUTION INTRAMUSCULAR; INTRAVENOUS
Status: ACTIVE | OUTPATIENT
Start: 2019-08-05 | End: 2019-08-06

## 2019-08-05 RX ORDER — MELOXICAM 7.5 MG/1
7.5 TABLET ORAL DAILY
Status: DISCONTINUED | OUTPATIENT
Start: 2019-08-06 | End: 2019-08-09 | Stop reason: HOSPADM

## 2019-08-05 RX ORDER — HYDROXYZINE HYDROCHLORIDE 10 MG/1
10 TABLET, FILM COATED ORAL
Status: DISCONTINUED | OUTPATIENT
Start: 2019-08-05 | End: 2019-08-09 | Stop reason: HOSPADM

## 2019-08-05 RX ORDER — DIPHENHYDRAMINE HYDROCHLORIDE 50 MG/ML
12.5 INJECTION, SOLUTION INTRAMUSCULAR; INTRAVENOUS AS NEEDED
Status: DISCONTINUED | OUTPATIENT
Start: 2019-08-05 | End: 2019-08-05 | Stop reason: HOSPADM

## 2019-08-05 RX ORDER — ONDANSETRON 2 MG/ML
4 INJECTION INTRAMUSCULAR; INTRAVENOUS
Status: ACTIVE | OUTPATIENT
Start: 2019-08-05 | End: 2019-08-06

## 2019-08-05 RX ORDER — SODIUM CHLORIDE 0.9 % (FLUSH) 0.9 %
5-40 SYRINGE (ML) INJECTION AS NEEDED
Status: DISCONTINUED | OUTPATIENT
Start: 2019-08-05 | End: 2019-08-05 | Stop reason: HOSPADM

## 2019-08-05 RX ORDER — FAMOTIDINE 20 MG/1
20 TABLET, FILM COATED ORAL 2 TIMES DAILY
Status: DISCONTINUED | OUTPATIENT
Start: 2019-08-05 | End: 2019-08-09 | Stop reason: HOSPADM

## 2019-08-05 RX ORDER — PROPOFOL 10 MG/ML
INJECTION, EMULSION INTRAVENOUS AS NEEDED
Status: DISCONTINUED | OUTPATIENT
Start: 2019-08-05 | End: 2019-08-05 | Stop reason: HOSPADM

## 2019-08-05 RX ORDER — ROCURONIUM BROMIDE 10 MG/ML
INJECTION, SOLUTION INTRAVENOUS AS NEEDED
Status: DISCONTINUED | OUTPATIENT
Start: 2019-08-05 | End: 2019-08-05 | Stop reason: HOSPADM

## 2019-08-05 RX ORDER — SODIUM CHLORIDE 0.9 % (FLUSH) 0.9 %
5-40 SYRINGE (ML) INJECTION EVERY 8 HOURS
Status: DISCONTINUED | OUTPATIENT
Start: 2019-08-05 | End: 2019-08-05 | Stop reason: HOSPADM

## 2019-08-05 RX ORDER — NEOSTIGMINE METHYLSULFATE 1 MG/ML
INJECTION, SOLUTION INTRAVENOUS AS NEEDED
Status: DISCONTINUED | OUTPATIENT
Start: 2019-08-05 | End: 2019-08-05 | Stop reason: HOSPADM

## 2019-08-05 RX ORDER — CEFAZOLIN SODIUM/WATER 2 G/20 ML
2 SYRINGE (ML) INTRAVENOUS
Status: COMPLETED | OUTPATIENT
Start: 2019-08-05 | End: 2019-08-05

## 2019-08-05 RX ORDER — LIDOCAINE HYDROCHLORIDE 10 MG/ML
0.1 INJECTION, SOLUTION EPIDURAL; INFILTRATION; INTRACAUDAL; PERINEURAL AS NEEDED
Status: DISCONTINUED | OUTPATIENT
Start: 2019-08-05 | End: 2019-08-05 | Stop reason: HOSPADM

## 2019-08-05 RX ORDER — FENTANYL CITRATE 50 UG/ML
INJECTION, SOLUTION INTRAMUSCULAR; INTRAVENOUS AS NEEDED
Status: DISCONTINUED | OUTPATIENT
Start: 2019-08-05 | End: 2019-08-05 | Stop reason: HOSPADM

## 2019-08-05 RX ORDER — CLOPIDOGREL BISULFATE 75 MG/1
75 TABLET ORAL DAILY
Status: DISCONTINUED | OUTPATIENT
Start: 2019-08-06 | End: 2019-08-09 | Stop reason: HOSPADM

## 2019-08-05 RX ORDER — HYDROCHLOROTHIAZIDE 25 MG/1
12.5 TABLET ORAL DAILY
Status: DISCONTINUED | OUTPATIENT
Start: 2019-08-06 | End: 2019-08-09 | Stop reason: HOSPADM

## 2019-08-05 RX ORDER — PIOGLITAZONEHYDROCHLORIDE 15 MG/1
15 TABLET ORAL DAILY
Status: DISCONTINUED | OUTPATIENT
Start: 2019-08-06 | End: 2019-08-09 | Stop reason: HOSPADM

## 2019-08-05 RX ORDER — SODIUM CHLORIDE 9 MG/ML
125 INJECTION, SOLUTION INTRAVENOUS CONTINUOUS
Status: DISPENSED | OUTPATIENT
Start: 2019-08-05 | End: 2019-08-06

## 2019-08-05 RX ORDER — GLYCOPYRROLATE 0.2 MG/ML
INJECTION INTRAMUSCULAR; INTRAVENOUS AS NEEDED
Status: DISCONTINUED | OUTPATIENT
Start: 2019-08-05 | End: 2019-08-05 | Stop reason: HOSPADM

## 2019-08-05 RX ORDER — MIDAZOLAM HYDROCHLORIDE 1 MG/ML
0.5 INJECTION, SOLUTION INTRAMUSCULAR; INTRAVENOUS
Status: DISCONTINUED | OUTPATIENT
Start: 2019-08-05 | End: 2019-08-05 | Stop reason: HOSPADM

## 2019-08-05 RX ORDER — LIDOCAINE HYDROCHLORIDE 20 MG/ML
INJECTION, SOLUTION EPIDURAL; INFILTRATION; INTRACAUDAL; PERINEURAL AS NEEDED
Status: DISCONTINUED | OUTPATIENT
Start: 2019-08-05 | End: 2019-08-05 | Stop reason: HOSPADM

## 2019-08-05 RX ORDER — FENTANYL CITRATE 50 UG/ML
50 INJECTION, SOLUTION INTRAMUSCULAR; INTRAVENOUS AS NEEDED
Status: DISCONTINUED | OUTPATIENT
Start: 2019-08-05 | End: 2019-08-05 | Stop reason: HOSPADM

## 2019-08-05 RX ORDER — POLYETHYLENE GLYCOL 3350 17 G/17G
17 POWDER, FOR SOLUTION ORAL DAILY
Status: DISCONTINUED | OUTPATIENT
Start: 2019-08-06 | End: 2019-08-09 | Stop reason: HOSPADM

## 2019-08-05 RX ORDER — PHENYLEPHRINE HCL IN 0.9% NACL 0.4MG/10ML
SYRINGE (ML) INTRAVENOUS AS NEEDED
Status: DISCONTINUED | OUTPATIENT
Start: 2019-08-05 | End: 2019-08-05 | Stop reason: HOSPADM

## 2019-08-05 RX ORDER — NALOXONE HYDROCHLORIDE 0.4 MG/ML
0.4 INJECTION, SOLUTION INTRAMUSCULAR; INTRAVENOUS; SUBCUTANEOUS AS NEEDED
Status: DISCONTINUED | OUTPATIENT
Start: 2019-08-05 | End: 2019-08-09 | Stop reason: HOSPADM

## 2019-08-05 RX ORDER — CELECOXIB 200 MG/1
200 CAPSULE ORAL ONCE
Status: COMPLETED | OUTPATIENT
Start: 2019-08-05 | End: 2019-08-05

## 2019-08-05 RX ORDER — DEXTROSE 50 % IN WATER (D50W) INTRAVENOUS SYRINGE
25-50 AS NEEDED
Status: DISCONTINUED | OUTPATIENT
Start: 2019-08-05 | End: 2019-08-09 | Stop reason: SDUPTHER

## 2019-08-05 RX ORDER — SODIUM CHLORIDE, SODIUM LACTATE, POTASSIUM CHLORIDE, CALCIUM CHLORIDE 600; 310; 30; 20 MG/100ML; MG/100ML; MG/100ML; MG/100ML
INJECTION, SOLUTION INTRAVENOUS
Status: DISCONTINUED | OUTPATIENT
Start: 2019-08-05 | End: 2019-08-05 | Stop reason: HOSPADM

## 2019-08-05 RX ORDER — SIMVASTATIN 40 MG/1
40 TABLET, FILM COATED ORAL
Status: DISCONTINUED | OUTPATIENT
Start: 2019-08-05 | End: 2019-08-09 | Stop reason: HOSPADM

## 2019-08-05 RX ORDER — KETAMINE HYDROCHLORIDE 10 MG/ML
INJECTION, SOLUTION INTRAMUSCULAR; INTRAVENOUS AS NEEDED
Status: DISCONTINUED | OUTPATIENT
Start: 2019-08-05 | End: 2019-08-05 | Stop reason: HOSPADM

## 2019-08-05 RX ORDER — METFORMIN HYDROCHLORIDE 500 MG/1
500 TABLET ORAL 2 TIMES DAILY WITH MEALS
Status: DISCONTINUED | OUTPATIENT
Start: 2019-08-06 | End: 2019-08-09 | Stop reason: HOSPADM

## 2019-08-05 RX ORDER — AMOXICILLIN 250 MG
1 CAPSULE ORAL 2 TIMES DAILY
Status: DISCONTINUED | OUTPATIENT
Start: 2019-08-05 | End: 2019-08-09 | Stop reason: HOSPADM

## 2019-08-05 RX ORDER — ONDANSETRON 2 MG/ML
4 INJECTION INTRAMUSCULAR; INTRAVENOUS AS NEEDED
Status: DISCONTINUED | OUTPATIENT
Start: 2019-08-05 | End: 2019-08-05 | Stop reason: HOSPADM

## 2019-08-05 RX ORDER — SUCCINYLCHOLINE CHLORIDE 20 MG/ML
INJECTION INTRAMUSCULAR; INTRAVENOUS AS NEEDED
Status: DISCONTINUED | OUTPATIENT
Start: 2019-08-05 | End: 2019-08-05 | Stop reason: HOSPADM

## 2019-08-05 RX ORDER — SODIUM CHLORIDE 0.9 % (FLUSH) 0.9 %
5-40 SYRINGE (ML) INJECTION EVERY 8 HOURS
Status: DISCONTINUED | OUTPATIENT
Start: 2019-08-05 | End: 2019-08-09 | Stop reason: HOSPADM

## 2019-08-05 RX ORDER — FENTANYL CITRATE 50 UG/ML
25 INJECTION, SOLUTION INTRAMUSCULAR; INTRAVENOUS
Status: DISCONTINUED | OUTPATIENT
Start: 2019-08-05 | End: 2019-08-05 | Stop reason: HOSPADM

## 2019-08-05 RX ORDER — SODIUM CHLORIDE 0.9 % (FLUSH) 0.9 %
5-40 SYRINGE (ML) INJECTION AS NEEDED
Status: DISCONTINUED | OUTPATIENT
Start: 2019-08-05 | End: 2019-08-09 | Stop reason: HOSPADM

## 2019-08-05 RX ORDER — LOSARTAN POTASSIUM 50 MG/1
50 TABLET ORAL DAILY
Status: DISCONTINUED | OUTPATIENT
Start: 2019-08-06 | End: 2019-08-09 | Stop reason: HOSPADM

## 2019-08-05 RX ORDER — SODIUM CHLORIDE, SODIUM LACTATE, POTASSIUM CHLORIDE, CALCIUM CHLORIDE 600; 310; 30; 20 MG/100ML; MG/100ML; MG/100ML; MG/100ML
75 INJECTION, SOLUTION INTRAVENOUS CONTINUOUS
Status: DISCONTINUED | OUTPATIENT
Start: 2019-08-05 | End: 2019-08-05 | Stop reason: HOSPADM

## 2019-08-05 RX ORDER — PREGABALIN 75 MG/1
75 CAPSULE ORAL ONCE
Status: COMPLETED | OUTPATIENT
Start: 2019-08-05 | End: 2019-08-05

## 2019-08-05 RX ORDER — HYDROMORPHONE HYDROCHLORIDE 1 MG/ML
0.2 INJECTION, SOLUTION INTRAMUSCULAR; INTRAVENOUS; SUBCUTANEOUS
Status: DISCONTINUED | OUTPATIENT
Start: 2019-08-05 | End: 2019-08-05 | Stop reason: HOSPADM

## 2019-08-05 RX ADMIN — ACETAMINOPHEN 1000 MG: 500 TABLET ORAL at 18:34

## 2019-08-05 RX ADMIN — OXYCODONE HYDROCHLORIDE 10 MG: 5 TABLET ORAL at 21:44

## 2019-08-05 RX ADMIN — CELECOXIB 200 MG: 200 CAPSULE ORAL at 11:12

## 2019-08-05 RX ADMIN — ROCURONIUM BROMIDE 5 MG: 10 SOLUTION INTRAVENOUS at 11:50

## 2019-08-05 RX ADMIN — Medication 80 MCG: at 11:49

## 2019-08-05 RX ADMIN — LIDOCAINE HYDROCHLORIDE 100 MG: 20 INJECTION, SOLUTION EPIDURAL; INFILTRATION; INTRACAUDAL; PERINEURAL at 11:49

## 2019-08-05 RX ADMIN — FAMOTIDINE 20 MG: 20 TABLET ORAL at 18:34

## 2019-08-05 RX ADMIN — Medication 10 ML: at 21:45

## 2019-08-05 RX ADMIN — PHENYLEPHRINE HYDROCHLORIDE 20 MCG/MIN: 10 INJECTION INTRAVENOUS at 12:07

## 2019-08-05 RX ADMIN — Medication 80 MCG: at 12:56

## 2019-08-05 RX ADMIN — FENTANYL CITRATE 50 MCG: 50 INJECTION, SOLUTION INTRAMUSCULAR; INTRAVENOUS at 12:26

## 2019-08-05 RX ADMIN — ONDANSETRON HYDROCHLORIDE 4 MG: 2 INJECTION, SOLUTION INTRAMUSCULAR; INTRAVENOUS at 13:00

## 2019-08-05 RX ADMIN — HYDROMORPHONE HYDROCHLORIDE 0.2 MG: 1 INJECTION, SOLUTION INTRAMUSCULAR; INTRAVENOUS; SUBCUTANEOUS at 13:53

## 2019-08-05 RX ADMIN — SODIUM CHLORIDE 125 ML/HR: 900 INJECTION, SOLUTION INTRAVENOUS at 15:30

## 2019-08-05 RX ADMIN — ROCURONIUM BROMIDE 15 MG: 10 SOLUTION INTRAVENOUS at 12:12

## 2019-08-05 RX ADMIN — Medication 1 MG: at 13:04

## 2019-08-05 RX ADMIN — PROPOFOL 100 MG: 10 INJECTION, EMULSION INTRAVENOUS at 11:49

## 2019-08-05 RX ADMIN — Medication 80 MCG: at 12:02

## 2019-08-05 RX ADMIN — SODIUM CHLORIDE, SODIUM LACTATE, POTASSIUM CHLORIDE, AND CALCIUM CHLORIDE 75 ML/HR: 600; 310; 30; 20 INJECTION, SOLUTION INTRAVENOUS at 11:22

## 2019-08-05 RX ADMIN — SODIUM CHLORIDE, POTASSIUM CHLORIDE, SODIUM LACTATE AND CALCIUM CHLORIDE: 600; 310; 30; 20 INJECTION, SOLUTION INTRAVENOUS at 11:41

## 2019-08-05 RX ADMIN — KETAMINE HYDROCHLORIDE 20 MG: 10 INJECTION, SOLUTION INTRAMUSCULAR; INTRAVENOUS at 12:05

## 2019-08-05 RX ADMIN — GLYCOPYRROLATE 0.2 MG: 0.2 INJECTION, SOLUTION INTRAMUSCULAR; INTRAVENOUS at 13:04

## 2019-08-05 RX ADMIN — FENTANYL CITRATE 100 MCG: 50 INJECTION, SOLUTION INTRAMUSCULAR; INTRAVENOUS at 11:39

## 2019-08-05 RX ADMIN — HYDROMORPHONE HYDROCHLORIDE 0.2 MG: 1 INJECTION, SOLUTION INTRAMUSCULAR; INTRAVENOUS; SUBCUTANEOUS at 17:00

## 2019-08-05 RX ADMIN — FENTANYL CITRATE 25 MCG: 50 INJECTION INTRAMUSCULAR; INTRAVENOUS at 14:15

## 2019-08-05 RX ADMIN — HYDROMORPHONE HYDROCHLORIDE 0.2 MG: 1 INJECTION, SOLUTION INTRAMUSCULAR; INTRAVENOUS; SUBCUTANEOUS at 14:25

## 2019-08-05 RX ADMIN — Medication 2 G: at 12:03

## 2019-08-05 RX ADMIN — HYDROMORPHONE HYDROCHLORIDE 0.2 MG: 1 INJECTION, SOLUTION INTRAMUSCULAR; INTRAVENOUS; SUBCUTANEOUS at 14:40

## 2019-08-05 RX ADMIN — SODIUM CHLORIDE, POTASSIUM CHLORIDE, SODIUM LACTATE AND CALCIUM CHLORIDE: 600; 310; 30; 20 INJECTION, SOLUTION INTRAVENOUS at 13:07

## 2019-08-05 RX ADMIN — PREGABALIN 75 MG: 75 CAPSULE ORAL at 11:13

## 2019-08-05 RX ADMIN — SUCCINYLCHOLINE CHLORIDE 140 MG: 20 INJECTION, SOLUTION INTRAMUSCULAR; INTRAVENOUS at 11:50

## 2019-08-05 RX ADMIN — FENTANYL CITRATE 25 MCG: 50 INJECTION INTRAMUSCULAR; INTRAVENOUS at 13:54

## 2019-08-05 RX ADMIN — Medication 80 MCG: at 12:59

## 2019-08-05 RX ADMIN — SENNOSIDES, DOCUSATE SODIUM 1 TABLET: 50; 8.6 TABLET, FILM COATED ORAL at 18:34

## 2019-08-05 RX ADMIN — Medication 2 G: at 20:00

## 2019-08-05 RX ADMIN — ROPIVACAINE HYDROCHLORIDE 20 ML: 5 INJECTION, SOLUTION EPIDURAL; INFILTRATION; PERINEURAL at 11:39

## 2019-08-05 RX ADMIN — PROPOFOL 50 MG: 10 INJECTION, EMULSION INTRAVENOUS at 11:50

## 2019-08-05 RX ADMIN — OXYCODONE HYDROCHLORIDE 10 MG: 5 TABLET ORAL at 18:42

## 2019-08-05 RX ADMIN — ACETAMINOPHEN 1000 MG: 500 TABLET ORAL at 11:12

## 2019-08-05 RX ADMIN — HYDROMORPHONE HYDROCHLORIDE 0.2 MG: 1 INJECTION, SOLUTION INTRAMUSCULAR; INTRAVENOUS; SUBCUTANEOUS at 14:10

## 2019-08-05 RX ADMIN — MIDAZOLAM HYDROCHLORIDE 2 MG: 1 INJECTION, SOLUTION INTRAMUSCULAR; INTRAVENOUS at 11:39

## 2019-08-05 RX ADMIN — FENTANYL CITRATE 50 MCG: 50 INJECTION, SOLUTION INTRAMUSCULAR; INTRAVENOUS at 11:49

## 2019-08-05 NOTE — ANESTHESIA PREPROCEDURE EVALUATION
Anesthetic History   No history of anesthetic complications            Review of Systems / Medical History  Patient summary reviewed, nursing notes reviewed and pertinent labs reviewed    Pulmonary  Within defined limits                 Neuro/Psych         Psychiatric history     Cardiovascular              CAD and CABG    Exercise tolerance: >4 METS     GI/Hepatic/Renal  Within defined limits              Endo/Other    Diabetes: type 2    Blood dyscrasia and arthritis     Other Findings   Comments: Von Willebrands  ISSUES WITH CLOTTING FACTOR VIII           Physical Exam    Airway  Mallampati: II  TM Distance: > 6 cm  Neck ROM: normal range of motion   Mouth opening: Normal     Cardiovascular  Regular rate and rhythm,  S1 and S2 normal,  no murmur, click, rub, or gallop  Rhythm: regular  Rate: normal         Dental  No notable dental hx       Pulmonary  Breath sounds clear to auscultation               Abdominal  GI exam deferred       Other Findings            Anesthetic Plan    ASA: 3  Anesthesia type: general      Post-op pain plan if not by surgeon: peripheral nerve block single    Induction: Intravenous  Anesthetic plan and risks discussed with: Patient

## 2019-08-05 NOTE — OP NOTES
8/5/2019  OPERATIVE REPORT      PREOPERATIVE DIAGNOSIS: Osteoarthritis, left knee. POSTOPERATIVE DIAGNOSIS: Osteoarthritis, left knee. OPERATIVE PROCEDURE: left total knee replacement. SURGEON: Shawn Calzada MD    ASSISTANT SURGEON: Heber Hernandez, [facility]    ANESTHESIA: Spinal.    Antibiotics: Ancef      INDICATIONS: : A 77 y.o.  male  with end stage osteoarthritis to the left knee, not responsive to conservative management. COMPLICATIONS:None    PROCEDURE: The patient was taken to the operating room, underwent  placement of spinal anesthesia. The knee and leg were prepped and  draped in the usual fashion. The leg was exsanguinated with the Esmarch  bandage and tourniquet inflated to 350 mmHg. A longitudinal midline  incision made down through skin and subcutaneous tissue. A medial  parapatellar arthrotomy was performed, and extended proximally along the  medial border of the quadriceps tendon, distally along the medial border of  the insertion of the patella tendon. Medial release was performed. Retropatellar fat pad was sharply excised. The patella was subluxated  laterally, the knee was flexed to 90 degrees. Drill was used to enter the  intramedullary canal of the distal femur. The 5 degree intramedullary guide  was applied and the distal femoral cut was performed. The femur was sized  to a 9. A 9 cutting block was applied, and the anterior, posterior,  chamfer cuts were performed. The posterior stabilized cut was performed. The extramedullary tibial guide was applied, and the tibia was cut in  neutral alignment. The tibia was sized to a G. Knee was balanced to varus  and valgus stress. Flexion and extension gaps were equal. Trial reduction  was performed, using 10 mm insert. Excellent range of motion and stability  were noted. The patella was everted, and the patella was cut using freehand  technique. Patella was sized to a 35.  Drill holes were placed, and patella  button applied. The patella tracked normally. All trial components were  removed, and surfaces were prepared using drill and punch. All residual  meniscal tissue was sharply excised. Hemostasis was obtained using Bovie  apparatus. All components were cemented in place with antibiotic cement, taking care to remove all  excess cement. The knee was maintained in full extension while the cement  hardened. The tourniquet was let down after a total tourniquet time of 40  minutes. Hemostasis was obtained using the Bovie apparatus. The joint was  extensively irrigated with antibiotic solution. The arthrotomy was repaired  using #2 Vicryl in interrupted figure-of-eight fashion. Subcutaneous tissue  was approximated using 2-0 Vicryl in interrupted fashion. Skin edges were  approximated using stapling apparatus. Joint was infiltrated with 30 mL of  0.5% Marcaine with epinephrine. Bulky sterile dressing was applied, and the  patient was transferred to recovery room in stable condition. There were no  Complications. The Physician assistant was involved in retraction and wound closure. ESTIMATED BLOOD LOSS: 100 cc.     SPECIMEN: Bone      Karson Padilla M.D.  8/5/2019  1:13 PM

## 2019-08-05 NOTE — ANESTHESIA POSTPROCEDURE EVALUATION
Post-Anesthesia Evaluation and Assessment    Patient: Kennedy Alvarado MRN: [id number]  SSN: [id number]    YOB: 1950  Age: 77 y.o. Sex: male      I have evaluated the patient and they are stable and ready for discharge from the PACU. Cardiovascular Function/Vital Signs  Visit Vitals  /58   Pulse 84   Temp 37.3 °C (99.1 °F)   Resp 11   Ht 5' 11\" (1.803 m)   Wt 95.7 kg (211 lb)   SpO2 96%   BMI 29.43 kg/m²       Patient is status post General anesthesia for Procedure(s):  LEFT TOTAL KNEE REPLACEMENT. Nausea/Vomiting: None    Postoperative hydration reviewed and adequate. Pain:  Pain Scale 1: Numeric (0 - 10) (08/05/19 1058)  Pain Intensity 1: 0 (08/05/19 1058)   Managed    Neurological Status:   Neuro (WDL): Within Defined Limits (08/05/19 1102)   At baseline    Mental Status, Level of Consciousness: Alert and  oriented to person, place, and time    Pulmonary Status:   O2 Device: CO2 nasal cannula (08/05/19 1337)   Adequate oxygenation and airway patent    Complications related to anesthesia: None    Post-anesthesia assessment completed. No concerns    Signed By: Matty Miramontes MD     August 5, 2019              Procedure(s):  LEFT TOTAL KNEE REPLACEMENT. No value filed. <BSHSIANPOST>    Vitals Value Taken Time   /67 8/5/2019  1:45 PM   Temp 37.3 °C (99.1 °F) 8/5/2019  1:37 PM   Pulse 81 8/5/2019  1:51 PM   Resp 14 8/5/2019  1:51 PM   SpO2 98 % 8/5/2019  1:51 PM   Vitals shown include unvalidated device data.

## 2019-08-05 NOTE — PROGRESS NOTES
The following herbal, alternative, and/or nutritional/dietary supplement product(s) has been discontinued  per [facility] approved policy:    Potassium 99 mg    Please reorder upon discharge if appropriate.

## 2019-08-05 NOTE — BRIEF OP NOTE
BRIEF OPERATIVE NOTE    Date of Procedure: 8/5/2019   Preoperative Diagnosis: DEGENERATIVE JOINT DISEASE LEFT KNEE  Postoperative Diagnosis: DEGENERATIVE JOINT DISEASE LEFT KNEE    Procedure(s):  LEFT TOTAL KNEE REPLACEMENT  Surgeon(s) and Role:     * Juliano Asif MD - Primary         Surgical Assistant: Assistant: Isis Lin, [facility]    Surgical Staff:  Celeste Ram  Circ-Relief: Yasmeen Horner RN  Physician Assistant: Jailene Gaxiola PA-C  Scrub Tech-1: Jesus Aaron  Scrub Tech-Relief: Fleet Tia  Surg Asst-1: Bartlett Pare A  Event Time In Time Out   Incision Start 1217    Incision Close       Anesthesia: General   Estimated Blood Loss: 100cc  Specimens: * No specimens in log *   Findings: DJD   Complications: none  Implants:   Implant Name Type Inv.  Item Serial No.  Lot No. LRB No. Used Action   CEMENT BNE GENTAMC GHV 40GM -- SMARTSET - [id number]  CEMENT  [employer] GHV 40GM -- SMARTSET  [facility] [address] [employer] [id number] Left 1 Implanted   CEMENT BNE GENTAMC GHV 40GM -- SMARTSET - SNA  CEMENT BNE GENTAMC GHV 40GM -- SMARTSET NA [facility] [employer] [id number] Left 1 Implanted   Articular Surface   NA [employer] [id number] Left 1 Implanted   FEM CR MARIA DE JESUS CCR STD SZ 9 LT -- PERSONA - SNA  FEM CR MARIA DE JESUS CCR STD SZ 9 LT -- PERSONA [employer] [id number] Left 1 Implanted   TIB PRN NP STM 5 DEG SZ GL -- PERSONA - SNA  TIB PRN NP STM 5 DEG SZ GL -- PERSONA NA [employer] [id number] Left 1 Implanted   INSERT ALL POLY PAT PLY 35MM -- PERSONA - SNA  INSERT ALL POLY PAT PLY 35MM -- PERSONA NA [employer] [id number] Left 1 Implanted

## 2019-08-05 NOTE — PROGRESS NOTES
Primary Nurse Raiza Orosco RN and Kamari Gilmore RN performed a dual skin assessment on this patient No impairment noted  Benjamin score is 21

## 2019-08-05 NOTE — PROGRESS NOTES
Consult received and chart reviewed. Patient reports he is still quite numb in his LLE and demonstrates inadequate motor return to safely attempt ambulation at this time. We will follow up in the morning to assess mobility.     Buck Novak, PT

## 2019-08-05 NOTE — ANESTHESIA PROCEDURE NOTES
Peripheral Block    Performed by: Brittany Burleson DO  Authorized by: Brittany Burleson DO       Pre-procedure: Indications: at surgeon's request and post-op pain management    Preanesthetic Checklist: patient identified, risks and benefits discussed, site marked, timeout performed and patient being monitored      Block Type:   Block Type:   Adductor canal  Laterality:  Left  Monitoring:  Standard ASA monitoring, continuous pulse ox, frequent vital sign checks, heart rate, responsive to questions and oxygen  Injection Technique:  Single shot  Procedures: ultrasound guided    Patient Position: supine  Prep: DuraPrep    Needle Type:  Stimuplex  Needle Gauge:  22 G  Needle Localization:  Ultrasound guidance    Assessment:  Number of attempts:  1  Injection Assessment:  Incremental injection every 5 mL, local visualized surrounding nerve on ultrasound, negative aspiration for blood, no paresthesia and no intravascular symptoms  Patient tolerance:  Patient tolerated the procedure well with no immediate complications

## 2019-08-05 NOTE — PROGRESS NOTES
TRANSFER - IN REPORT:    Verbal report received from Binu(name) on Tommy Barbour  being received from [facility]) for routine post - op      Report consisted of patients Situation, Background, Assessment and   Recommendations(SBAR). Information from the following report(s) SBAR was reviewed with the receiving nurse. Opportunity for questions and clarification was provided. Assessment completed upon patients arrival to unit and care assumed.

## 2019-08-05 NOTE — PERIOP NOTES
TRANSFER - OUT REPORT:    Verbal report given to Jenn Mclean RN on Betzaida Batista  being transferred to room [id number] for routine post - op       Report consisted of patients Situation, Background, Assessment and   Recommendations(SBAR). Time Pre op antibiotic given:  1219  Anesthesia Stop time:  2651  Vargas Present on Transfer to floor: NO  Order for Vargas on Chart:  N/a    Information from the following report(s) SBAR and MAR was reviewed with the receiving nurse. Opportunity for questions and clarification was provided. Is the patient on 02? YES       L/Min 2       Other     Is the patient on a monitor? NO    Is the nurse transporting with the patient? NO    Surgical Waiting Area notified of patient's transfer from PACU?  YES    Lines:   Peripheral IV 08/05/19 Left Arm (Active)   Site Assessment Clean, dry, & intact 8/5/2019  1:35 PM   Phlebitis Assessment 0 8/5/2019  2:45 PM   Infiltration Assessment 0 8/5/2019  2:45 PM   Dressing Status Clean, dry, & intact 8/5/2019  1:35 PM   Dressing Type Transparent 8/5/2019  1:35 PM   Hub Color/Line Status Infusing 8/5/2019  2:45 PM        The following personal items collected during your admission accompanied patient upon transfer:   Dental Appliance: Dental Appliances: None  Vision: Visual Aid: Glasses(READING GLASSES)  Hearing Aid:    Jewelry:    Clothing:    Other Valuables:    Valuables sent to safe:

## 2019-08-06 LAB
ANION GAP SERPL CALC-SCNC: 6 MMOL/L (ref 5–15)
BUN SERPL-MCNC: 23 MG/DL (ref 6–20)
BUN/CREAT SERPL: 21 (ref 12–20)
CALCIUM SERPL-MCNC: 7.8 MG/DL (ref 8.5–10.1)
CHLORIDE SERPL-SCNC: 105 MMOL/L (ref 97–108)
CO2 SERPL-SCNC: 23 MMOL/L (ref 21–32)
CREAT SERPL-MCNC: 1.12 MG/DL (ref 0.7–1.3)
GLUCOSE BLD STRIP.AUTO-MCNC: 154 MG/DL (ref 65–100)
GLUCOSE BLD STRIP.AUTO-MCNC: 196 MG/DL (ref 65–100)
GLUCOSE BLD STRIP.AUTO-MCNC: 205 MG/DL (ref 65–100)
GLUCOSE BLD STRIP.AUTO-MCNC: 210 MG/DL (ref 65–100)
GLUCOSE SERPL-MCNC: 160 MG/DL (ref 65–100)
HGB BLD-MCNC: 11.1 G/DL (ref 12.1–17)
POTASSIUM SERPL-SCNC: 4.4 MMOL/L (ref 3.5–5.1)
SERVICE CMNT-IMP: ABNORMAL
SODIUM SERPL-SCNC: 134 MMOL/L (ref 136–145)

## 2019-08-06 PROCEDURE — 51798 US URINE CAPACITY MEASURE: CPT

## 2019-08-06 PROCEDURE — 74011636637 HC RX REV CODE- 636/637: Performed by: PHYSICIAN ASSISTANT

## 2019-08-06 PROCEDURE — 97530 THERAPEUTIC ACTIVITIES: CPT

## 2019-08-06 PROCEDURE — 97161 PT EVAL LOW COMPLEX 20 MIN: CPT

## 2019-08-06 PROCEDURE — 82962 GLUCOSE BLOOD TEST: CPT

## 2019-08-06 PROCEDURE — 85018 HEMOGLOBIN: CPT

## 2019-08-06 PROCEDURE — 65270000029 HC RM PRIVATE

## 2019-08-06 PROCEDURE — 80048 BASIC METABOLIC PNL TOTAL CA: CPT

## 2019-08-06 PROCEDURE — 74011250637 HC RX REV CODE- 250/637: Performed by: PHYSICIAN ASSISTANT

## 2019-08-06 PROCEDURE — 97165 OT EVAL LOW COMPLEX 30 MIN: CPT

## 2019-08-06 PROCEDURE — 36415 COLL VENOUS BLD VENIPUNCTURE: CPT

## 2019-08-06 PROCEDURE — 74011250636 HC RX REV CODE- 250/636: Performed by: PHYSICIAN ASSISTANT

## 2019-08-06 PROCEDURE — 97535 SELF CARE MNGMENT TRAINING: CPT

## 2019-08-06 RX ORDER — AMOXICILLIN 250 MG
1 CAPSULE ORAL 2 TIMES DAILY
Qty: 20 TAB | Refills: 0 | Status: ON HOLD | OUTPATIENT
Start: 2019-08-06 | End: 2021-11-01

## 2019-08-06 RX ORDER — ASPIRIN 81 MG/1
81 TABLET ORAL DAILY
Status: DISCONTINUED | OUTPATIENT
Start: 2019-08-07 | End: 2019-08-09 | Stop reason: HOSPADM

## 2019-08-06 RX ORDER — MELOXICAM 7.5 MG/1
7.5 TABLET ORAL DAILY
Qty: 30 TAB | Refills: 0 | Status: ON HOLD | OUTPATIENT
Start: 2019-08-06 | End: 2021-11-01

## 2019-08-06 RX ORDER — ACETAMINOPHEN 500 MG
1000 TABLET ORAL EVERY 6 HOURS
Qty: 120 TAB | Refills: 0 | Status: ON HOLD | OUTPATIENT
Start: 2019-08-06 | End: 2021-11-01

## 2019-08-06 RX ORDER — GUAIFENESIN 100 MG/5ML
81 LIQUID (ML) ORAL DAILY
Status: DISCONTINUED | OUTPATIENT
Start: 2019-08-07 | End: 2019-08-06

## 2019-08-06 RX ORDER — OXYCODONE HYDROCHLORIDE 5 MG/1
5-10 TABLET ORAL
Qty: 60 TAB | Refills: 0 | Status: SHIPPED | OUTPATIENT
Start: 2019-08-06 | End: 2019-08-11

## 2019-08-06 RX ADMIN — FAMOTIDINE 20 MG: 20 TABLET ORAL at 18:29

## 2019-08-06 RX ADMIN — OXYCODONE HYDROCHLORIDE 10 MG: 5 TABLET ORAL at 19:35

## 2019-08-06 RX ADMIN — SENNOSIDES, DOCUSATE SODIUM 1 TABLET: 50; 8.6 TABLET, FILM COATED ORAL at 18:29

## 2019-08-06 RX ADMIN — PIOGLITAZONE 15 MG: 15 TABLET ORAL at 09:44

## 2019-08-06 RX ADMIN — ACETAMINOPHEN 1000 MG: 500 TABLET ORAL at 23:09

## 2019-08-06 RX ADMIN — ACETAMINOPHEN 1000 MG: 500 TABLET ORAL at 18:29

## 2019-08-06 RX ADMIN — SENNOSIDES, DOCUSATE SODIUM 1 TABLET: 50; 8.6 TABLET, FILM COATED ORAL at 09:43

## 2019-08-06 RX ADMIN — Medication 2 G: at 04:54

## 2019-08-06 RX ADMIN — OXYCODONE HYDROCHLORIDE 10 MG: 5 TABLET ORAL at 07:03

## 2019-08-06 RX ADMIN — INSULIN LISPRO 3 UNITS: 100 INJECTION, SOLUTION INTRAVENOUS; SUBCUTANEOUS at 18:28

## 2019-08-06 RX ADMIN — OXYCODONE HYDROCHLORIDE 10 MG: 5 TABLET ORAL at 09:54

## 2019-08-06 RX ADMIN — ACETAMINOPHEN 1000 MG: 500 TABLET ORAL at 12:35

## 2019-08-06 RX ADMIN — Medication 10 ML: at 22:00

## 2019-08-06 RX ADMIN — OXYCODONE HYDROCHLORIDE 10 MG: 5 TABLET ORAL at 02:31

## 2019-08-06 RX ADMIN — APIXABAN 2.5 MG: 2.5 TABLET, FILM COATED ORAL at 07:04

## 2019-08-06 RX ADMIN — METFORMIN HYDROCHLORIDE 500 MG: 500 TABLET ORAL at 18:29

## 2019-08-06 RX ADMIN — OXYCODONE HYDROCHLORIDE 10 MG: 5 TABLET ORAL at 16:15

## 2019-08-06 RX ADMIN — OXYCODONE HYDROCHLORIDE 10 MG: 5 TABLET ORAL at 23:09

## 2019-08-06 RX ADMIN — LOSARTAN POTASSIUM 50 MG: 50 TABLET ORAL at 09:44

## 2019-08-06 RX ADMIN — ACETAMINOPHEN 1000 MG: 500 TABLET ORAL at 00:00

## 2019-08-06 RX ADMIN — METFORMIN HYDROCHLORIDE 500 MG: 500 TABLET ORAL at 07:42

## 2019-08-06 RX ADMIN — MELOXICAM 7.5 MG: 7.5 TABLET ORAL at 12:35

## 2019-08-06 RX ADMIN — Medication 10 ML: at 07:05

## 2019-08-06 RX ADMIN — HYDROCHLOROTHIAZIDE 12.5 MG: 25 TABLET ORAL at 09:43

## 2019-08-06 RX ADMIN — INSULIN LISPRO 2 UNITS: 100 INJECTION, SOLUTION INTRAVENOUS; SUBCUTANEOUS at 12:35

## 2019-08-06 RX ADMIN — FAMOTIDINE 20 MG: 20 TABLET ORAL at 09:43

## 2019-08-06 RX ADMIN — INSULIN LISPRO 2 UNITS: 100 INJECTION, SOLUTION INTRAVENOUS; SUBCUTANEOUS at 07:42

## 2019-08-06 RX ADMIN — CLOPIDOGREL BISULFATE 75 MG: 75 TABLET ORAL at 09:43

## 2019-08-06 RX ADMIN — Medication 10 ML: at 16:13

## 2019-08-06 NOTE — PROGRESS NOTES
MANASA: SNF Referral to [facility]. Care Management Interventions  PCP Verified by CM: Yes  Palliative Care Criteria Met (RRAT>21 & CHF Dx)?: No  Mode of Transport at Discharge: BLS  Transition of Care Consult (CM Consult): SNF(patient requesting SNF no preference)  Partner SNF: Yes  MyChart Signup: No  Discharge Durable Medical Equipment: No  Health Maintenance Reviewed: Yes  Physical Therapy Consult: Yes  Occupational Therapy Consult: Yes  Speech Therapy Consult: No  Current Support Network: Lives Alone([place] [address]  Confirm Follow Up Transport: Family  Plan discussed with Pt/Family/Caregiver: Yes  Freedom of Choice Offered: Yes  [place] Resource Information Provided?: No  Discharge Location  Discharge Placement: Home with home health    Reason for Admission:   Left Total Knee Replacement                   RRAT Score:  8                   Plan for utilizing home health:      No, the patient states he lives alone and needs to go to rehab, [facility] patient, referral sent to [facility]                    Current Advanced Directive/Advance Care Plan: Full Code-ADV not on file                         CRM met with the patient. He did not do well with therapy today. The patient lives alone. Will likely need SNF placement for rehab. The daughter [place] [address] [facility] [address]. The patient has no facility preference. CRM sent referral to [facility] via [address] Will follow. CM offered screening for Medicaid Long-Term Services & Supports. The patient stated that he is not a candidate for Medicaid.      Leeland Brunner, [address]

## 2019-08-06 NOTE — PROGRESS NOTES
Problem: Mobility Impaired (Adult and Pediatric)  Goal: *Acute Goals and Plan of Care (Insert Text)  Description  FUNCTIONAL STATUS PRIOR TO ADMISSION: Patient was modified independent using a Rollator for functional mobility. HOME SUPPORT PRIOR TO ADMISSION: The patient lived alone with minimal local support. Physical Therapy Goals  Initiated 8/6/2019    1. Patient will move from supine to sit and sit to supine , scoot up and down and roll side to side in bed with minimal assistance/contact guard assist within 4 days. 2. Patient will perform sit to stand with minimal assistance/contact guard assist within 4 days. 3. Patient will ambulate with minimal assistance/contact guard assist for 75 feet with the least restrictive device within 4 days. 4. Patient will ascend/descend 4 stairs with cane and one handrail(s) with minimal assistance/contact guard assist within 4 days. 5. Patient will perform home exercise program per protocol with independence within 4 days. 6. Patient will demonstrate AROM 0-90 degrees in operative joint within 4 days. 8/6/2019 1729 by Frederick Carrion  Outcome: Not Progressing Towards Goal   PHYSICAL THERAPY TREATMENT  Patient: Rohit Valero (28 y.o. male)  Date: 8/6/2019  Diagnosis: Primary osteoarthritis of left knee [M17.12] Primary osteoarthritis of left knee  Procedure(s) (LRB):  LEFT TOTAL KNEE REPLACEMENT (Left) 1 Day Post-Op  Precautions: Fall, WBAT  Chart, physical therapy assessment, plan of care and goals were reviewed. ASSESSMENT  Based on the objective data described below, patient is not improving toward goals. Current Level of Function Impacting Discharge (mobility/balance): Performed bed mobility with moderate assistance. Sit-stand performed x2 with maximal assistance x 2 attempts. First & second attempts, he kept left knee flexed and hip externally rotated and was unable to maintain standing at RW.  Third attempt, bed was elevated, immobilizer was applied to LLE for stability and patient sit-stand with maximal assistance of 2. Was able to achieve upright and was able to move LLE and take one and one-half steps. Patient then stated that he had to stop as his thighs were burning and were giving out. Took one step back, then placed back on bed with max x 2. Other factors to consider for discharge: No assist in the home/stairs in the home/not making progress yet, POD#1 after 2 PT sessions         PLAN :  Patient continues to benefit from skilled intervention to address the above impairments. Continue treatment per established plan of care. to address goals. Recommendation for discharge: (in order for the patient to meet his/her long term goals)  To be determined: Patient stating aDvid Mazariegos is no way that I can go home. I have to go to a Facility for a few weeks. \" If he does not improve, he may indeed have to go to some type of Rehab. This discharge recommendation:  A follow-up discussion with the attending provider and/or case management is planned. Discussion will be ongoing. Equipment recommendations for successful discharge (if) home: Per patient, has cane, rollator and rolling walker. SUBJECTIVE:   Patient stated I can't move this leg. I am in pain. My thighs can't hold me up. I feel like I am falling backward.     OBJECTIVE DATA SUMMARY:   Critical Behavior:  Neurologic State: Alert  Orientation Level: Oriented X4  Cognition: Follows commands  Safety/Judgement: Decreased awareness of environment  Functional Mobility Training:  Bed Mobility:  Rolling: Minimum assistance  Supine to Sit: Moderate assistance  Sit to Supine: Moderate assistance  Scooting: Moderate assistance        Transfers:  Sit to Stand: Maximum assistance; Adaptive equipment;Assist x2; Other (comment)(bed elevated)  Stand to Sit: Maximum assistance;Assist x2;Adaptive equipment; Other (comment)(immobilizer)                             Balance:  Sitting: Impaired; With support  Sitting - Static: Good (unsupported)  Sitting - Dynamic: Fair (occasional)  Standing: Impaired; With support  Standing - Static: Fair;Constant support; Other (comment)(with immobilizer and )  Standing - Dynamic : Constant support;Poor; Other (comment)(immobilizer)  Ambulation/Gait Training:              Gait Description (WDL): (UNABLE TO TAKE ANY STEPS)                                      Activity Tolerance:   Poor      After treatment patient left in no apparent distress:   Supine in bed, Call bell within reach, Side rails x 3 and nurse notified.      COMMUNICATION/COLLABORATION:   The patients plan of care was discussed with: Occupational Therapist, Registered Nurse and     Robby Nicholson   Time Calculation: 30 mins

## 2019-08-06 NOTE — PROGRESS NOTES
Ortho Progress Note  1 Day Post-Op  Procedure(s):  LEFT TOTAL KNEE REPLACEMENT    Subjective: Pt doing well today, denies significant pain \"I've been staying on top of my pain medication. \" Ready to work with PT. Denies N/V, lightheadedness, dizziness, SOB, or abdominal pain. Physical Exam:   Visit Vitals  /77 (BP 1 Location: Left arm, BP Patient Position: At rest)   Pulse 97   Temp 99.5 °F (37.5 °C)   Resp 16   Ht 5' 11\" (1.803 m)   Wt 95.7 kg (211 lb)   SpO2 97%   BMI 29.43 kg/m²     General appearance: alert, cooperative, no distress, appears stated age  Abdomen: soft, non-tender.  Bowel sounds normal. No masses,  no organomegaly  Extremities: Homans sign is negative, no sign of DVT, limited ROM L knee 2/2 bulky dressing, thigh without tenderness, calf soft and nontender  Pulses: 2+ and symmetric  Wound: bulky dressing c/d/i, no drainage  Neurologic: Grossly normal, ankle dorsi/plantar flexion intact, heel raise limited    Recent Results (from the past 24 hour(s))   TYPE & SCREEN    Collection Time: 08/05/19 11:26 AM   Result Value Ref Range    Crossmatch Expiration 08/08/2019     ABO/Rh(D) Ciara Garza POSITIVE     Antibody screen NEG    GLUCOSE, POC    Collection Time: 08/05/19 11:30 AM   Result Value Ref Range    Glucose (POC) 92 65 - 100 mg/dL    Performed by [facility], POC    Collection Time: 08/05/19  2:03 PM   Result Value Ref Range    Glucose (POC) 115 (H) 65 - 100 mg/dL    Performed by [address], POC    Collection Time: 08/05/19 10:00 PM   Result Value Ref Range    Glucose (POC) 146 (H) 65 - 100 mg/dL    Performed by [facility], BASIC    Collection Time: 08/06/19  7:11 AM   Result Value Ref Range    Sodium 134 (L) 136 - 145 mmol/L    Potassium 4.4 3.5 - 5.1 mmol/L    Chloride 105 97 - 108 mmol/L    CO2 23 21 - 32 mmol/L    Anion gap 6 5 - 15 mmol/L    Glucose 160 (H) 65 - 100 mg/dL    BUN 23 (H) 6 - 20 MG/DL    Creatinine 1.12 0.70 - 1.30 MG/DL BUN/Creatinine ratio 21 (H) 12 - 20      GFR est AA >60 >60 ml/min/1.73m2    GFR est non-AA >60 >60 ml/min/1.73m2    Calcium 7.8 (L) 8.5 - 10.1 MG/DL   HEMOGLOBIN    Collection Time: 08/06/19  7:11 AM   Result Value Ref Range    HGB 11.1 (L) 12.1 - 17.0 g/dL   GLUCOSE, POC    Collection Time: 08/06/19  7:12 AM   Result Value Ref Range    Glucose (POC) 154 (H) 65 - 100 mg/dL    Performed by Mortimer Maul        Assessment:  Stable Post Op    Plan:  DVT Prophylaxis:Eliquis 2.5mg BID x 2 weeks                               Plavix 75mg daily    --Pt with hx VWB disease, on Plavix and ASA daily PTA, has never had any issues according to patient, monitor. May need to adjust anticoagulation  Physical Therapy: WBAT                             Pt will require extensive PT prior to discharge home due to pain, gait instability, and limited social support. Discharge Planning: home with home health when able  Pain control: tylenol, mobic 7.5, oxycodone 10mg  Acute blood loss anemia: hgb 11.1, normal post op finding, pt asymptomatic, will continue to monitor      Addendum:  Discussed anticoagulation with Pt's hematologist, Dr. Britany Carrero who recommends pt continue with home regimen: Plavix 75 and ASA 81 PO Daily. Will d/c eliquis.

## 2019-08-06 NOTE — PROGRESS NOTES
Bedside and Verbal shift change report given to Olivia Persaud (oncoming nurse) by Altagracia Hope (offgoing nurse). Report included the following information SBAR.

## 2019-08-06 NOTE — PROGRESS NOTES
Problem: Self Care Deficits Care Plan (Adult)  Goal: *Acute Goals and Plan of Care (Insert Text)  Description    FUNCTIONAL STATUS PRIOR TO ADMISSION: Patient was modified independent using a Rollator for functional mobility (pt endorsed he completed ADL seated frequently). HOME SUPPORT: The patient lived alone with friends to provide assistance. Occupational Therapy Goals  Initiated 8/6/2019  1. Patient will perform bathing with minimal assistance/contact guard assist within 7 day(s). 2.  Patient will perform LB dressing moderate A level within 7 days. 3.  Patient will perform toilet transfers with minimal assistance/contact guard assist within 7 day(s). 4.  Patient will perform all aspects of toileting with minimal assistance/contact guard assist within 7 day(s). 5.  Patient will participate in upper extremity therapeutic exercise/activities with supervision/set-up for 10 minutes within 7 day(s). 6.  Patient will utilize energy conservation techniques during functional activities with verbal and visual cues within 7 day(s). Outcome: Not Met  OCCUPATIONAL THERAPY EVALUATION  Patient: Arun Harper (19 y.o. male)  Date: 8/6/2019  Primary Diagnosis: Primary osteoarthritis of left knee [M17.12]  Procedure(s) (LRB):  LEFT TOTAL KNEE REPLACEMENT (Left) 1 Day Post-Op   Precautions:   Fall, WBAT    ASSESSMENT  Based on the objective data described below, the patient presents with impaired balance, weakness, impaired functional reach to feet and subsequently impaired independence with ADL and related mobility s/p TKA. Pt endorsed he had notably difficulty with mobility and completed ADL seated level PTA due to LLE pain and weakness. Pt reports he has not transferred into his tub to shower in approximately 3 months (completed sink level ADL). Pt stated he desires rehabilitation stay next level of care.      Current Level of Function Impacting Discharge (ADLs/self-care): Maximum A LB ADL    Functional Outcome Measure: The patient scored 40/100 on the Barthel outcome measure which is indicative of 50% functional impairment. Other factors to consider for discharge: Pt resides alone     Patient will benefit from skilled therapy intervention to address the above noted impairments. PLAN :  Recommendations and Planned Interventions: self care training, functional mobility training, therapeutic exercise, balance training, therapeutic activities, endurance activities, patient education and home safety training    Frequency/Duration: Patient will be followed by occupational therapy 5 times a week to address goals. Recommendation for discharge: (in order for the patient to meet his/her long term goals)  To be determined: Pt requested rehabilitation stay next level of care     This discharge recommendation:  Pts discharge plan discussed by team today. Equipment recommendations for successful discharge (if) home: to be determined by rehab facility       SUBJECTIVE:   Patient stated I told my doctor rehab is the only way.     OBJECTIVE DATA SUMMARY:   HISTORY:   Past Medical History:   Diagnosis Date    Arthritis     CAD (coronary artery disease) 03/2018    CABG, MI    Cancer ([facility] [address] 2012    Prostate    Chronic pain     LEFT KNEE    Diabetes ([facility] [address]     NIDDM    Ill-defined condition     ISSUES WITH CLOTTING FACTOR VIII    Psychiatric disorder     DEPRESSION    Von Willebrand disease ([facility] [address]      Past Surgical History:   Procedure Laterality Date    ABDOMEN SURGERY PROC UNLISTED  1954    RIGHT CONGENITAL INGUINAL HERNIA REPAIR    ABDOMEN SURGERY PROC UNLISTED  1991    LEFT INGUINAL HERNIA REPAIR    ABDOMEN SURGERY PROC UNLISTED      SIGMOIDOSCOPY-FOR HEMMORIODS.     CABG, ARTERY-VEIN, FOUR  03/2018    CABG    HX APPENDECTOMY  1979    HX ARTERIAL BYPASS      HX CATARACT REMOVAL Bilateral 2016, 2011    HX GI  2017    COLONOSCOPY WITH NO POLYPS    HX HEENT  1949    T&A    HX HEENT  1972 EXTRACTION OF WISDOM TEETH X 4 (2 SURGERIES 2 TEETH AT A TIME)    HX ORTHOPAEDIC  02/2006    RIGHT HAMMER TOES X 4 FIXED    HX OTHER SURGICAL  1963    RIGHT INNER THIGH CEBACIOUS CYST REMOVED    HX PROSTATECTOMY  06/2012    RADICAL PROSTATECTOMY    TOTAL HIP ARTHROPLASTY Right 2017       Expanded or extensive additional review of patient history:     Home Situation  Home Environment: Private residence  # Steps to Enter: 1  One/Two Story Residence: One story  Living Alone: Yes  Support Systems: Family member(s)  Patient Expects to be Discharged to[de-identified] Rehabilitation facility  Current DME Used/Available at Home: [address], rollator  Tub or Shower Type: Tub/Shower combination    Hand dominance: Right    EXAMINATION OF PERFORMANCE DEFICITS:  Cognitive/Behavioral Status:  Neurologic State: Alert  Orientation Level: Oriented X4  Cognition: Follows commands  Perception: Appears intact  Perseveration: Perseverates during conversation  Safety/Judgement: Decreased awareness of environment    Skin: Appears intact BUE    Edema: No edema noted BUE    Hearing: Auditory  Auditory Impairment: None    Vision/Perceptual:    Tracking: Able to track stimulus in all quadrants w/o difficulty              Visual Fields: (Appears intact)       Acuity: Within Defined Limits    Corrective Lenses: Glasses    Range of Motion:    AROM: Generally decreased, functional  PROM: Generally decreased, functional(unable to fully extend L knee/postures in hip ER in supine)                      Strength:    Strength: Generally decreased, functional                Coordination:  Coordination: Generally decreased, functional  Fine Motor Skills-Upper: Left Intact; Right Intact    Gross Motor Skills-Upper: Left Intact; Right Intact    Tone & Sensation:    Tone: Normal  Sensation: Intact                      Balance:  Sitting: Impaired  Sitting - Static: Good (unsupported)  Sitting - Dynamic: Fair (occasional)  Standing: Impaired; With support  Standing - Static: Poor;Constant support  Standing - Dynamic : Other (comment)(UNABLE)    Functional Mobility and Transfers for ADLs:  Bed Mobility:  Rolling: Minimum assistance  Supine to Sit: Moderate assistance  Sit to Supine: Moderate assistance  Scooting: Maximum assistance(Supine scoot to [facility])    Transfers:  Sit to Stand: Maximum assistance;Assist x2  Stand to Sit: Maximum assistance;Assist x2    ADL Assessment:  Feeding: Setup    Oral Facial Hygiene/Grooming: Setup(Seated)    Bathing: Maximum assistance    Upper Body Dressing: Setup    Lower Body Dressing: Maximum assistance    Toileting: Maximum assistance                ADL Intervention and task modifications:                                     Cognitive Retraining  Safety/Judgement: Decreased awareness of environment       Functional Measure:  Barthel Index:    Bathin  Bladder: 5  Bowels: 10  Groomin  Dressin  Feeding: 10  Mobility: 0  Stairs: 0  Toilet Use: 5  Transfer (Bed to Chair and Back): 0  Total: 40/100        Percentage of impairment   0%   1-19%   20-39%   40-59%   60-79%   80-99%   100%   Barthel Score 0-100 100 99-80 79-60 59-40 20-39 1-19   0     The Barthel ADL Index: Guidelines  1. The index should be used as a record of what a patient does, not as a record of what a patient could do. 2. The main aim is to establish degree of independence from any help, physical or verbal, however minor and for whatever reason. 3. The need for supervision renders the patient not independent. 4. A patient's performance should be established using the best available evidence. Asking the patient, friends/relatives and nurses are the usual sources, but direct observation and common sense are also important. However direct testing is not needed. 5. Usually the patient's performance over the preceding 24-48 hours is important, but occasionally longer periods will be relevant. 6. Middle categories imply that the patient supplies over 50 per cent of the effort.   7. Use of aids to be independent is allowed. Bandar Méndez., Barthel, DSAMMIE (1187). Functional evaluation: the Barthel Index. [address] (14)2. JULIET Isidro, Gloria Bass., Shankar Lorenzo.Luzma, [address] (1999). Measuring the change indisability after inpatient rehabilitation; comparison of the responsiveness of the Barthel Index and Functional [place] Measure. Journal of Neurology, Neurosurgery, and Psychiatry, 66(4), 597-445. TRACE Muñiz, WAYNE Yeung, & Linette Muir M.A. (2004.) Assessment of post-stroke quality of life in cost-effectiveness studies: The usefulness of the Barthel Index and the EuroQoL-5D. Quality of Life Research, 15, 899-94        Occupational Therapy Evaluation Charge Determination   History Examination Decision-Making   LOW Complexity : Brief history review  MEDIUM Complexity : 3-5 performance deficits relating to physical, cognitive , or psychosocial skils that result in activity limitations and / or participation restrictions MEDIUM Complexity : Patient may present with comorbidities that affect occupational performnce. Miniml to moderate modification of tasks or assistance (eg, physical or verbal ) with assesment(s) is necessary to enable patient to complete evaluation       Based on the above components, the patient evaluation is determined to be of the following complexity level: LOW   Pain Rating:  None reported at rest.     Activity Tolerance:   requires rest breaks  Please refer to the flowsheet for vital signs taken during this treatment. After treatment patient left in no apparent distress:    Supine in bed    COMMUNICATION/EDUCATION:   The patients plan of care was discussed with: Physical Therapist.    Home safety education was provided and the patient/caregiver indicated understanding., Patient/family have participated as able in goal setting and plan of care. and Patient/family agree to work toward stated goals and plan of care.     This patients plan of care is appropriate for delegation to [facility].     Thank you for this referral.  Foster Settler  Time Calculation: 15 mins

## 2019-08-06 NOTE — DIABETES MGMT
[facility]    [facility] Ortho Education Note    Recommendations/ Comments: Blood sugars trending up. If appropriate, please consider:   - addition of Lantus 15 units  Diet changed to include consistent carbohydrate    Current hospital DM medication: Lispro correction, normal sensitivity and Actos    Chart reviewed and initial evaluation complete on Steven Rodríguez. Patient is a 77 y.o. male with known Type 2 Diabetes on oral agents (dual therapy): metformin (generic), pioglitazone (Actos)  insulin injections: Tresiba 20 units at home. A1c:   Lab Results   Component Value Date/Time    Hemoglobin A1c 8.1 (H) 07/01/2019 12:43 PM       Assessed and instructed patient on the following:   · risk of wound infection/ delayed healing   · interpretation of lab results, blood sugar goals, complications of diabetes mellitus, hypoglycemia prevention and treatment, insulin adjustments and nutrition. Provided patient with the following: [x]            Survival skills education materials               [x]            BG guidelines for post-op patients                  [x]            Outpatient D[facility] contact number    Recent Glucose Results:   Lab Results   Component Value Date/Time     (H) 08/06/2019 07:11 AM    GLUCPOC 196 (H) 08/06/2019 11:53 AM    GLUCPOC 154 (H) 08/06/2019 07:12 AM    GLUCPOC 146 (H) 08/05/2019 10:00 PM        Lab Results   Component Value Date/Time    Creatinine 1.12 08/06/2019 07:11 AM     Estimated Creatinine Clearance: 72.5 mL/min (based on SCr of 1.12 mg/dL). Active Orders   Diet    DIET REGULAR        PO intake: No data found. Will continue to follow as needed. Thank you.    Shayne Ewing, MS, RN, CDE    Time spent: 40 minutes

## 2019-08-06 NOTE — PROGRESS NOTES
Patient assessed for readiness to ambulate. Additional Blood Pressure/Pulse Data  Pulse 2: 88  BP 2: 145/74  BP Method 2: Automatic  Patient Position 2: Sitting  Pulse 3: 94  BP 3: (!) 154/92  BP Method 3: Automatic  Patient Position 3: Standing Vital Signs  Level of Consciousness: Alert  Temp: 98.1 °F (36.7 °C)  Temp Source: Oral  Pulse (Heart Rate): 74  Heart Rate Source: Monitor  Cardiac Rhythm: Normal sinus rhythm  Resp Rate: 16  BP: 101/58  MAP (Monitor): 75  MAP (Calculated): 72  BP 1 Location: Right arm  BP 1 Method: Automatic  BP Patient Position: Supine  MEWS Score: 1. Patient ambulated with assistance of 2 nurses. Patient ambulated with gait belt and walker. Patient walked to sidestepped to Head of Bed. Patient returned safely to bed.

## 2019-08-06 NOTE — PROGRESS NOTES
Bedside and Verbal shift change report given to Jenn Mclean RN (oncoming nurse) by Rigo Lewis RN (offgoing nurse). Report included the following information SBAR, Kardex, OR Summary, Procedure Summary, Intake/Output, MAR and Recent Results.

## 2019-08-06 NOTE — PROGRESS NOTES
Problem: Mobility Impaired (Adult and Pediatric)  Goal: *Acute Goals and Plan of Care (Insert Text)  Note:   FUNCTIONAL STATUS PRIOR TO ADMISSION: Patient was modified independent using a Rollator for functional mobility. HOME SUPPORT PRIOR TO ADMISSION: Patient lived alone with occasional assistance from friend, but states that he is primarily on his own. Physical Therapy Goals  Initiated 8/6/2019    1. Patient will move from supine to sit and sit to supine , scoot up and down and roll side to side in bed with supervision/set-up within 4 days. 2. Patient will perform sit to stand with supervision/set-up within 4 days. 3. Patient will ambulate with supervision/set-up for 75 feet with the least restrictive device within 4 days. 4. Patient will ascend/descend 4 stairs with cane and one handrail with minimal assistance/contact guard assist within 4 days. 5. Patient will perform home exercise program per protocol with independence within 4 days. 6. Patient will demonstrate AROM 0-90 degrees in operative joint within 4 days. PHYSICAL THERAPY EVALUATION  Patient: Hugo Chairez (5 y.o. male)  Date: 8/6/2019  Primary Diagnosis: Primary osteoarthritis of left knee [M17.12]  Procedure(s) (LRB):  LEFT TOTAL KNEE REPLACEMENT (Left) 1 Day Post-Op   Precautions:  Fall, WBAT      ASSESSMENT  Based on the objective data described below, the patient presents with significant impairment in functional mobility, activity tolerance and balance s/p L TKA. Per patient, PLOF was modified independent  with ADLs and IADLs with use of rollator due to frequent episodes of thighs \"burning and giving out\". Patient states that he lives alone and will have limited to no assistance. Current Level of Function Impacting Discharge (mobility/balance):  After assisting patient in dressing, putting on socks and sneakers at his request, he required moderate assistance for bed mobility and had difficulty coming into sitting due to weak core muscles. Sit-stand transfer required +2 maximal assistance and patient could not straighten L knee despite tactile, verbal and mirroring cues. Also held LLE in hip external rotation that he was unable to correct. Could take very little weight through LLE and stood at [facility] for only about one minute, then had to sit as right leg gave out. Unable to take any steps at all. Back into bed with moderate assistance. Instructed patient to perform quad sets with 5 second hold x 10 every hour and to make sure that large towel roll remains at lateral portion of L knee in order to keep the hip in neutral and to encourage L knee extension. Wrote this on patient's white board and informed nurse. Reported above to Arnita Leventhal. For Dr. Daphne Sevilla. Patient performed very poorly at his first PT session this morning. Unable to ambulate or stand up straight with RW gait belt and moderate assistance of 2. Patient is stating that he \"is going to need to go to Rehab\" since he lives alone. Original plan was Home Health Pt. Will continue to follow and hopefully he will improve rapidly. Will continue to monitor progress and communicate with Dr. Daphne Sevilla and Social Work regarding discharge disposition. Functional Outcome Measure: The patient scored 25/100 on the Barthel outcome measure which is indicative of 60-79% impairment in functional mobility and ADLs. .      Other factors to consider for discharge: Lives alone with limited assistance/reported history of \"thighs giving out\" and several other co-morbidities/A & O x 4/Motivated     Patient will benefit from skilled therapy intervention to address the above noted impairments.        PLAN :  Recommendations and Planned Interventions: bed mobility training, transfer training, gait training, therapeutic exercises, patient and family training/education and therapeutic activities      Frequency/Duration: Patient will be followed by physical therapy:  twice daily to address goals. Recommendation for discharge: (in order for the patient to meet his/her long term goals)  To be determined: See above narrative. This discharge recommendation:  Is actively being discussed with physician and care team.    Equipment recommendations for successful discharge (if) home: None-per patient, has cane, rollator and rolling walker. SUBJECTIVE:   Patient stated Honor Caulk will do the best I can, but I have had cardiac bypass, joint problems and a long history of burning and weakness in both thighs that cause me to need to sit down quickly and frequently.     OBJECTIVE DATA SUMMARY:   HISTORY:    Past Medical History:   Diagnosis Date    Arthritis     CAD (coronary artery disease) 03/2018    CABG, MI    Cancer ([facility] 2012    Prostate    Chronic pain     LEFT KNEE    Diabetes ([facility] [address]     NIDDM    Ill-defined condition     ISSUES WITH CLOTTING FACTOR VIII    Psychiatric disorder     DEPRESSION    Von Willebrand disease ([facility] [address]      Past Surgical History:   Procedure Laterality Date    ABDOMEN SURGERY PROC UNLISTED  1954    RIGHT CONGENITAL INGUINAL HERNIA REPAIR    ABDOMEN SURGERY PROC UNLISTED  1991    LEFT INGUINAL HERNIA REPAIR    ABDOMEN SURGERY PROC UNLISTED      SIGMOIDOSCOPY-FOR HEMMORIODS.     CABG, ARTERY-VEIN, FOUR  03/2018    CABG    HX APPENDECTOMY  1979    HX ARTERIAL BYPASS      HX CATARACT REMOVAL Bilateral 2016, 2011    HX GI  2017    COLONOSCOPY WITH NO POLYPS    HX HEENT  1949    T&A    HX HEENT  1972    EXTRACTION OF WISDOM TEETH X 4 (2 SURGERIES 2 TEETH AT A TIME)    HX ORTHOPAEDIC  02/2006    RIGHT HAMMER TOES X 4 FIXED    HX OTHER SURGICAL  1963    RIGHT INNER THIGH CEBACIOUS CYST REMOVED    HX PROSTATECTOMY  06/2012    RADICAL PROSTATECTOMY    TOTAL HIP ARTHROPLASTY Right 2017       Personal factors and/or comorbidities impacting plan of care: Lives alone with limited assistance/reported history of \"thighs giving out\" and several other co-morbidities/A & O x 4/Motivated    Home Situation  Home Environment: Private residence  # Steps to Enter: 1  One/Two Story Residence: One story  Living Alone: Yes  Support Systems: Family member(s)  Patient Expects to be Discharged to[de-identified] Rehabilitation facility  Current DME Used/Available at Home: Glucometer, [place] [employer], rollator    EXAMINATION/PRESENTATION/DECISION MAKING:   Critical Behavior:  Neurologic State: Alert, Appropriate for age  Orientation Level: Appropriate for age, Oriented X4  Cognition: Appropriate decision making, Appropriate for age attention/concentration, Appropriate safety awareness, Follows commands     Hearing: Auditory  Auditory Impairment: None  Range Of Motion:  AROM: Generally decreased, functional           PROM: Generally decreased, functional(unable to fully extend L knee/postures in hip ER in supine)           Strength:    Strength: Generally decreased, functional(BOTH quads are weak)                    Tone & Sensation:   Tone: Normal              Sensation: Intact               Coordination:  Coordination: Within functional limits  Vision:      Functional Mobility:  Bed Mobility:  Rolling: Minimum assistance  Supine to Sit: Moderate assistance  Sit to Supine: Moderate assistance  Scooting: Moderate assistance  Transfers:  Sit to Stand: Maximum assistance;Assist x2  Stand to Sit: Maximum assistance;Assist x2                       Balance:   Sitting: Impaired; Without support  Sitting - Static: Fair (occasional)  Sitting - Dynamic: Fair (occasional)  Standing: Impaired; With support  Standing - Static: Poor;Constant support  Standing - Dynamic : Other (comment)(UNABLE)  Ambulation/Gait Training:              Gait Description (WDL): (UNABLE TO TAKE ANY STEPS)                 Therapeutic Exercises:   Instructed patient to perform quad sets with 5 second hold x 10 every hour and to make sure that large towel roll remains at lateral portion of L knee in order to keep the hip in neutral and to encourage L knee extension.  Wrote this on patient's white board and informed nurse. Functional Measure:  Barthel Index:    Bathin  Bladder: 5  Bowels: 10  Groomin  Dressin  Feeding: 10  Mobility: 0  Stairs: 0  Toilet Use: 0  Transfer (Bed to Chair and Back): 0  Total: 25/100       Percentage of impairment   0%   1-19%   20-39%   40-59%   60-79%   80-99%   100%   Barthel Score 0-100 100 99-80 79-60 59-40 20-39 1-19   0     The Barthel ADL Index: Guidelines  1. The index should be used as a record of what a patient does, not as a record of what a patient could do. 2. The main aim is to establish degree of independence from any help, physical or verbal, however minor and for whatever reason. 3. The need for supervision renders the patient not independent. 4. A patient's performance should be established using the best available evidence. Asking the patient, friends/relatives and nurses are the usual sources, but direct observation and common sense are also important. However direct testing is not needed. 5. Usually the patient's performance over the preceding 24-48 hours is important, but occasionally longer periods will be relevant. 6. Middle categories imply that the patient supplies over 50 per cent of the effort. 7. Use of aids to be independent is allowed. Joelle Rock., Barthel, D.W. (1163). Functional evaluation: the Barthel Index. [facility] (14)2. Jun  jamal JULIET Diaz, Shira Lagoser., [facility] [place], [address] [place] [address] (). Measuring the change indisability after inpatient rehabilitation; comparison of the responsiveness of the Barthel Index and Functional [place] Measure. Journal of Neurology, Neurosurgery, and Psychiatry, 66(4), 684-438. Josh May, N.J.A, ZANDRA Yeung.PRAVIN, & Helen Cardona, M.A. (2004.) Assessment of post-stroke quality of life in cost-effectiveness studies: The usefulness of the Barthel Index and the EuroQoL-5D.  Quality of Life Research, 15, 261-50           Physical Therapy Evaluation Charge Determination   History Examination Presentation Decision-Making   MEDIUM  Complexity : 1-2 comorbidities / personal factors will impact the outcome/ POC  MEDIUM Complexity : 3 Standardized tests and measures addressing body structure, function, activity limitation and / or participation in recreation  MEDIUM Complexity : Evolving with changing characteristics  MEDIUM Complexity : FOTO score of 26-74      Based on the above components, the patient evaluation is determined to be of the following complexity level: MEDIUM    Pain Ratin/10 L knee    Activity Tolerance:   Fair      After treatment patient left in no apparent distress:   Supine in bed, Call bell within reach, Side rails x 3 and nurse notified. COMMUNICATION/EDUCATION:   The patients plan of care was discussed with: Registered Nurse, Physician and . Fall prevention education was provided and the patient/caregiver indicated understanding., Patient/family have participated as able in goal setting and plan of care. and Patient/family agree to work toward stated goals and plan of care.     Thank you for this referral.  Lan Badillo   Time Calculation: 45 mins

## 2019-08-07 ENCOUNTER — APPOINTMENT (OUTPATIENT)
Dept: VASCULAR SURGERY | Age: 69
DRG: 470 | End: 2019-08-07
Attending: PHYSICIAN ASSISTANT
Payer: MEDICARE

## 2019-08-07 LAB
GLUCOSE BLD STRIP.AUTO-MCNC: 162 MG/DL (ref 65–100)
GLUCOSE BLD STRIP.AUTO-MCNC: 171 MG/DL (ref 65–100)
GLUCOSE BLD STRIP.AUTO-MCNC: 196 MG/DL (ref 65–100)
GLUCOSE BLD STRIP.AUTO-MCNC: 221 MG/DL (ref 65–100)
SERVICE CMNT-IMP: ABNORMAL

## 2019-08-07 PROCEDURE — 97530 THERAPEUTIC ACTIVITIES: CPT

## 2019-08-07 PROCEDURE — 74011636637 HC RX REV CODE- 636/637: Performed by: PHYSICIAN ASSISTANT

## 2019-08-07 PROCEDURE — 74011250637 HC RX REV CODE- 250/637: Performed by: PHYSICIAN ASSISTANT

## 2019-08-07 PROCEDURE — 77030034850

## 2019-08-07 PROCEDURE — 51798 US URINE CAPACITY MEASURE: CPT

## 2019-08-07 PROCEDURE — 65270000029 HC RM PRIVATE

## 2019-08-07 PROCEDURE — 77030011943

## 2019-08-07 PROCEDURE — 93970 EXTREMITY STUDY: CPT

## 2019-08-07 PROCEDURE — 82962 GLUCOSE BLOOD TEST: CPT

## 2019-08-07 RX ORDER — INSULIN GLARGINE 100 [IU]/ML
15 INJECTION, SOLUTION SUBCUTANEOUS DAILY
Status: DISCONTINUED | OUTPATIENT
Start: 2019-08-08 | End: 2019-08-09 | Stop reason: HOSPADM

## 2019-08-07 RX ADMIN — INSULIN LISPRO 3 UNITS: 100 INJECTION, SOLUTION INTRAVENOUS; SUBCUTANEOUS at 12:42

## 2019-08-07 RX ADMIN — ACETAMINOPHEN 1000 MG: 500 TABLET ORAL at 20:08

## 2019-08-07 RX ADMIN — OXYCODONE HYDROCHLORIDE 10 MG: 5 TABLET ORAL at 20:08

## 2019-08-07 RX ADMIN — Medication 10 ML: at 18:20

## 2019-08-07 RX ADMIN — OXYCODONE HYDROCHLORIDE 10 MG: 5 TABLET ORAL at 04:09

## 2019-08-07 RX ADMIN — INSULIN LISPRO 2 UNITS: 100 INJECTION, SOLUTION INTRAVENOUS; SUBCUTANEOUS at 17:18

## 2019-08-07 RX ADMIN — ACETAMINOPHEN 1000 MG: 500 TABLET ORAL at 12:42

## 2019-08-07 RX ADMIN — SIMVASTATIN 40 MG: 40 TABLET, FILM COATED ORAL at 23:41

## 2019-08-07 RX ADMIN — FAMOTIDINE 20 MG: 20 TABLET ORAL at 10:21

## 2019-08-07 RX ADMIN — SENNOSIDES, DOCUSATE SODIUM 1 TABLET: 50; 8.6 TABLET, FILM COATED ORAL at 10:16

## 2019-08-07 RX ADMIN — OXYCODONE HYDROCHLORIDE 10 MG: 5 TABLET ORAL at 23:42

## 2019-08-07 RX ADMIN — CLOPIDOGREL BISULFATE 75 MG: 75 TABLET ORAL at 10:22

## 2019-08-07 RX ADMIN — FAMOTIDINE 20 MG: 20 TABLET ORAL at 17:17

## 2019-08-07 RX ADMIN — ASPIRIN 81 MG: 81 TABLET ORAL at 10:17

## 2019-08-07 RX ADMIN — OXYCODONE HYDROCHLORIDE 10 MG: 5 TABLET ORAL at 10:25

## 2019-08-07 RX ADMIN — METFORMIN HYDROCHLORIDE 500 MG: 500 TABLET ORAL at 17:17

## 2019-08-07 RX ADMIN — SENNOSIDES, DOCUSATE SODIUM 1 TABLET: 50; 8.6 TABLET, FILM COATED ORAL at 17:16

## 2019-08-07 RX ADMIN — LOSARTAN POTASSIUM 50 MG: 50 TABLET ORAL at 10:23

## 2019-08-07 RX ADMIN — OXYCODONE HYDROCHLORIDE 10 MG: 5 TABLET ORAL at 06:33

## 2019-08-07 RX ADMIN — PIOGLITAZONE 15 MG: 15 TABLET ORAL at 10:19

## 2019-08-07 RX ADMIN — METFORMIN HYDROCHLORIDE 500 MG: 500 TABLET ORAL at 10:20

## 2019-08-07 RX ADMIN — ACETAMINOPHEN 1000 MG: 500 TABLET ORAL at 23:41

## 2019-08-07 RX ADMIN — POLYETHYLENE GLYCOL 3350 17 G: 17 POWDER, FOR SOLUTION ORAL at 10:14

## 2019-08-07 RX ADMIN — HYDROCHLOROTHIAZIDE 12.5 MG: 25 TABLET ORAL at 10:28

## 2019-08-07 RX ADMIN — MELOXICAM 7.5 MG: 7.5 TABLET ORAL at 10:18

## 2019-08-07 RX ADMIN — Medication 10 ML: at 06:33

## 2019-08-07 RX ADMIN — ACETAMINOPHEN 1000 MG: 500 TABLET ORAL at 06:33

## 2019-08-07 NOTE — PROGRESS NOTES
[facility] in Orthopedic [facility] Unit on 8/7/2019. Visited by Rev. Ashly Joyner MDiv, [facility], [facility] service: [phone])

## 2019-08-07 NOTE — PROGRESS NOTES
PHYSICAL THERAPY  Patient deferred am PT session due to not feeling well, \"had a rough night\", just had a Vargas placed. Patient requested to wait until afternoon.   Orrin Riedel

## 2019-08-07 NOTE — PROGRESS NOTES
Problem: Mobility Impaired (Adult and Pediatric)  Goal: *Acute Goals and Plan of Care (Insert Text)  Description  FUNCTIONAL STATUS PRIOR TO ADMISSION: Patient was modified independent using a Rollator for functional mobility. HOME SUPPORT PRIOR TO ADMISSION: The patient lived alone with minimal local support. Physical Therapy Goals  Initiated 8/6/2019    1. Patient will move from supine to sit and sit to supine , scoot up and down and roll side to side in bed with minimal assistance/contact guard assist within 4 days. 2. Patient will perform sit to stand with minimal assistance/contact guard assist within 4 days. 3. Patient will ambulate with minimal assistance/contact guard assist for 75 feet with the least restrictive device within 4 days. 4. Patient will ascend/descend 4 stairs with cane and one handrail(s) with minimal assistance/contact guard assist within 4 days. 5. Patient will perform home exercise program per protocol with independence within 4 days. 6. Patient will demonstrate AROM 0-90 degrees in operative joint within 4 days. Outcome: Progressing Towards Goal   PHYSICAL THERAPY TREATMENT  Patient: Palmira Miller (54 y.o. male)  Date: 8/7/2019  Diagnosis: Primary osteoarthritis of left knee [M17.12] Primary osteoarthritis of left knee  Procedure(s) (LRB):  LEFT TOTAL KNEE REPLACEMENT (Left) 2 Days Post-Op  Precautions: Fall, WBAT  Chart, physical therapy assessment, plan of care and goals were reviewed. ASSESSMENT  Based on the objective data described below, patient demonstrated slight improvement with bed mobility and transfers. Current Level of Function Impacting Discharge (mobility/balance): Pain seems well-controlled. Bed mobility and transfers performed with minimal assistance-moderate assistance. Able to balance in standing with RW and with/without immobilizer. Still having difficulty moving LEs forward, states that he cannot coordinate the movement.  Took 2 steps forward, 1 step back. Now has a Vargas catheter. Other factors to consider for discharge: Lives alone/not ambulating yet         PLAN :  Patient continues to benefit from skilled intervention to address the above impairments. Continue treatment per established plan of care. to address goals. Recommendation for discharge: (in order for the patient to meet his/her long term goals)  Therapy 3 hours per day 5-7 days per week    This discharge recommendation:  Has been made in collaboration with the attending provider and/or case management    Equipment recommendations for successful discharge (if) home: None-per patient, has cane and rolling walker. SUBJECTIVE:   Patient stated [facility] had a rough night and now I have this Vargas.     OBJECTIVE DATA SUMMARY:   Critical Behavior:  Neurologic State: Alert, Appropriate for age  Orientation Level: Appropriate for age, Oriented X4  Cognition: Appropriate decision making, Appropriate for age attention/concentration, Appropriate safety awareness, Follows commands  Safety/Judgement: Decreased awareness of environment  Functional Mobility Training:  Bed Mobility:     Supine to Sit: Minimum assistance  Sit to Supine: Minimum assistance  Scooting: Minimum assistance        Transfers:  Sit to Stand: Moderate assistance  Stand to Sit: Moderate assistance                             Balance:  Sitting: Intact  Sitting - Static: Good (unsupported)  Sitting - Dynamic: Fair (occasional)  Standing: Impaired; With support  Standing - Static: Fair;Constant support  Standing - Dynamic : Poor;Constant support  Ambulation/Gait Training:                Activity Tolerance:   Fair      After treatment patient left in no apparent distress:   Supine in bed, Call bell within reach, Side rails x 3 and nurse notified     COMMUNICATION/COLLABORATION:   The patients plan of care was discussed with: Occupational Therapist and Registered Nurse    Jaylan Bradshaw   Time Calculation: 40 mins

## 2019-08-07 NOTE — PROGRESS NOTES
This AM. Pt has blisters in tiger stripe formation on side of knee below dry dressing. ABD applied to protect blisters. SAÚL Brown Single aware and Day shift nurse aware too.

## 2019-08-07 NOTE — PROGRESS NOTES
Bedside and Verbal shift change report given to Dean Hung (oncoming nurse) by Elizabeth Sears (offgoing nurse). Report included the following information SBAR.

## 2019-08-07 NOTE — PROGRESS NOTES
SAÚL Martinez notified of patient complaining of calf pain and muscle spasm. Order given for bilateral lower extremity doppler.

## 2019-08-07 NOTE — PROGRESS NOTES
Occupational Therapy:  08/07/19    Chart reviewed for OT treatment. Received in bed requesting deferral of therapy due to \"not feeling well\" and \"had a rough night\". He also reports discomfort from nunes placement this AM. Requested therapy return this afternoon. Will follow up as able.      Thank you,  Gregoria Bright, OTR/L

## 2019-08-07 NOTE — PROGRESS NOTES
MANASA: [facility] for SNF when medically stable. Patient eligible for SNF on 8/8. CRM will follow to set up [facility] transport.      Cyrus Bull, [address]

## 2019-08-07 NOTE — PROGRESS NOTES
Ortho POD#2  Pain better, difficulty voiding, H/O prostatectomy, straight cathed this AM, no BM yet  afeb   Knee OK  N/V intact  Plan: bowel meds           May need nunes for a couple days           Wants to go to rehab

## 2019-08-07 NOTE — DIABETES MGMT
[facility]    [facility] Progress Note    Recommendations/ Comments: Blood sugars trending up. If appropriate, please consider:   - addition of Lantus 15 units  Notified Dr. Tho Flores office of above    Current hospital DM medication: Lispro correction, normal sensitivity, Meformin and Actos    Chart reviewed on Luiza Islas. Patient is a 77 y.o. male with known Type 2 Diabetes on oral agents (dual therapy): metformin (generic), pioglitazone (Actos), Tresiba 20 units at home  insulin injections: Tresiba 20 units at home. A1c:   Lab Results   Component Value Date/Time    Hemoglobin A1c 8.1 (H) 07/01/2019 12:43 PM    Hemoglobin A1c 7.1 (H) 10/30/2017 05:41 PM       Recent Glucose Results:   Lab Results   Component Value Date/Time    GLUCPOC 221 (H) 08/07/2019 11:08 AM    GLUCPOC 196 (H) 08/07/2019 07:16 AM    GLUCPOC 205 (H) 08/06/2019 09:51 PM        Lab Results   Component Value Date/Time    Creatinine 1.12 08/06/2019 07:11 AM     Estimated Creatinine Clearance: 72.5 mL/min (based on SCr of 1.12 mg/dL). Active Orders   Diet    DIET DIABETIC WITH OPTIONS Consistent Carb 1800kcal; Regular        PO intake: No data found. Will continue to follow as needed.     Thank you  Eve Boggs MS, RN, CDE    Time spent: 6 minutes

## 2019-08-07 NOTE — PROGRESS NOTES
Bedside and Verbal shift change report given to Fausto Dixon RN (oncoming nurse) by Kushal Abrams RN (offgoing nurse). Report included the following information SBAR, Kardex, OR Summary, Procedure Summary, Intake/Output, MAR and Recent Results.

## 2019-08-08 LAB
GLUCOSE BLD STRIP.AUTO-MCNC: 127 MG/DL (ref 65–100)
GLUCOSE BLD STRIP.AUTO-MCNC: 128 MG/DL (ref 65–100)
GLUCOSE BLD STRIP.AUTO-MCNC: 140 MG/DL (ref 65–100)
GLUCOSE BLD STRIP.AUTO-MCNC: 209 MG/DL (ref 65–100)
SERVICE CMNT-IMP: ABNORMAL

## 2019-08-08 PROCEDURE — 97530 THERAPEUTIC ACTIVITIES: CPT

## 2019-08-08 PROCEDURE — 97116 GAIT TRAINING THERAPY: CPT

## 2019-08-08 PROCEDURE — 82962 GLUCOSE BLOOD TEST: CPT

## 2019-08-08 PROCEDURE — 65270000029 HC RM PRIVATE

## 2019-08-08 PROCEDURE — 74011250637 HC RX REV CODE- 250/637: Performed by: PHYSICIAN ASSISTANT

## 2019-08-08 PROCEDURE — 74011636637 HC RX REV CODE- 636/637: Performed by: PHYSICIAN ASSISTANT

## 2019-08-08 RX ORDER — MAGNESIUM CITRATE
296 SOLUTION, ORAL ORAL
Status: DISPENSED | OUTPATIENT
Start: 2019-08-08 | End: 2019-08-08

## 2019-08-08 RX ADMIN — HYDROCHLOROTHIAZIDE 12.5 MG: 25 TABLET ORAL at 08:38

## 2019-08-08 RX ADMIN — FAMOTIDINE 20 MG: 20 TABLET ORAL at 17:47

## 2019-08-08 RX ADMIN — ACETAMINOPHEN 500 MG: 500 TABLET ORAL at 12:00

## 2019-08-08 RX ADMIN — OXYCODONE HYDROCHLORIDE 5 MG: 5 TABLET ORAL at 11:40

## 2019-08-08 RX ADMIN — OXYCODONE HYDROCHLORIDE 10 MG: 5 TABLET ORAL at 07:11

## 2019-08-08 RX ADMIN — POLYETHYLENE GLYCOL 3350 17 G: 17 POWDER, FOR SOLUTION ORAL at 08:38

## 2019-08-08 RX ADMIN — SENNOSIDES, DOCUSATE SODIUM 1 TABLET: 50; 8.6 TABLET, FILM COATED ORAL at 17:47

## 2019-08-08 RX ADMIN — SIMVASTATIN 40 MG: 40 TABLET, FILM COATED ORAL at 22:52

## 2019-08-08 RX ADMIN — METFORMIN HYDROCHLORIDE 500 MG: 500 TABLET ORAL at 08:38

## 2019-08-08 RX ADMIN — INSULIN GLARGINE 15 UNITS: 100 INJECTION, SOLUTION SUBCUTANEOUS at 10:14

## 2019-08-08 RX ADMIN — ACETAMINOPHEN 1000 MG: 500 TABLET ORAL at 07:11

## 2019-08-08 RX ADMIN — METFORMIN HYDROCHLORIDE 500 MG: 500 TABLET ORAL at 17:47

## 2019-08-08 RX ADMIN — OXYCODONE HYDROCHLORIDE 5 MG: 5 TABLET ORAL at 22:52

## 2019-08-08 RX ADMIN — PIOGLITAZONE 15 MG: 15 TABLET ORAL at 08:38

## 2019-08-08 RX ADMIN — INSULIN LISPRO 3 UNITS: 100 INJECTION, SOLUTION INTRAVENOUS; SUBCUTANEOUS at 12:23

## 2019-08-08 RX ADMIN — INSULIN LISPRO 2 UNITS: 100 INJECTION, SOLUTION INTRAVENOUS; SUBCUTANEOUS at 16:47

## 2019-08-08 RX ADMIN — ASPIRIN 81 MG: 81 TABLET ORAL at 08:38

## 2019-08-08 RX ADMIN — MELOXICAM 7.5 MG: 7.5 TABLET ORAL at 08:38

## 2019-08-08 RX ADMIN — SENNOSIDES, DOCUSATE SODIUM 1 TABLET: 50; 8.6 TABLET, FILM COATED ORAL at 08:38

## 2019-08-08 RX ADMIN — CLOPIDOGREL BISULFATE 75 MG: 75 TABLET ORAL at 08:38

## 2019-08-08 RX ADMIN — OXYCODONE HYDROCHLORIDE 10 MG: 5 TABLET ORAL at 15:15

## 2019-08-08 RX ADMIN — ACETAMINOPHEN 1000 MG: 500 TABLET ORAL at 22:52

## 2019-08-08 RX ADMIN — OXYCODONE HYDROCHLORIDE 5 MG: 5 TABLET ORAL at 19:21

## 2019-08-08 RX ADMIN — LOSARTAN POTASSIUM 50 MG: 50 TABLET ORAL at 08:38

## 2019-08-08 RX ADMIN — FAMOTIDINE 20 MG: 20 TABLET ORAL at 08:39

## 2019-08-08 NOTE — PROGRESS NOTES
Problem: Mobility Impaired (Adult and Pediatric)  Goal: *Acute Goals and Plan of Care (Insert Text)  Description  FUNCTIONAL STATUS PRIOR TO ADMISSION: Patient was modified independent using a Rollator for functional mobility. HOME SUPPORT PRIOR TO ADMISSION: The patient lived alone with minimal local support. Physical Therapy Goals  Initiated 8/6/2019    1. Patient will move from supine to sit and sit to supine , scoot up and down and roll side to side in bed with minimal assistance/contact guard assist within 4 days. 2. Patient will perform sit to stand with minimal assistance/contact guard assist within 4 days. 3. Patient will ambulate with minimal assistance/contact guard assist for 75 feet with the least restrictive device within 4 days. 4. Patient will ascend/descend 4 stairs with cane and one handrail(s) with minimal assistance/contact guard assist within 4 days. 5. Patient will perform home exercise program per protocol with independence within 4 days. 6. Patient will demonstrate AROM 0-90 degrees in operative joint within 4 days. Outcome: Progressing Towards Goal  PHYSICAL THERAPY TREATMENT  Patient: Rohit Valero (60 y.o. male)  Date: 8/8/2019  Diagnosis: Primary osteoarthritis of left knee [M17.12] Primary osteoarthritis of left knee  Procedure(s) (LRB):  LEFT TOTAL KNEE REPLACEMENT (Left) 3 Days Post-Op  Precautions: Fall, WBAT  Chart, physical therapy assessment, plan of care and goals were reviewed. ASSESSMENT  Based on the objective data described below, pt continues to demonstrate impaired balance and gait, decreased strength, increased pain, and baseline bilateral LE neuropathy impacting mobility. Pt was able to progress gait distance this date, but needed significant amount of assistance at end of gait trial due to poor balance and reports of L knee buckling as well as RLE giving way.  Pt verbalized wanting to be able to return home with HHPT, but pt continues to require 1-2 person assist for all aspects of OOB mobility. Pt stated he lives alone, but could call someone to come help him whenever he needed it. Current Level of Function Impacting Discharge (mobility/balance): standby-CGA bed mobility, sit-stand transfers varied from min A to mod A x 1-2 persons, gait training ~12 feet with RW with constant min A and progressed to MOD A x 1-2 persons    Other factors to consider for discharge: lives alone, 1 small step to enter, poor progress with acute therapy, limited to no assistance at home, baseline mobility status is limited to short gait distance with a RW and rollator follow         PLAN :  Patient continues to benefit from skilled intervention to address the above impairments. Continue treatment per established plan of care. to address goals. Recommendation for discharge: (in order for the patient to meet his/her long term goals)  Therapy up to 5 days/week in SNF setting    This discharge recommendation:  Has been made in collaboration with the attending provider and/or case management    Equipment recommendations for successful discharge (if) home: to be determined by rehab facility       SUBJECTIVE:   Patient stated I know it's going to be a struggle at home at first.    OBJECTIVE DATA SUMMARY:   Critical Behavior:  Neurologic State: Alert  Orientation Level: Oriented X4  Cognition: Appropriate decision making  Safety/Judgement: Decreased awareness of environment            Functional Mobility Training:  Bed Mobility:     Supine to Sit: Contact guard assistance     Scooting: Stand-by assistance        Transfers:  Sit to Stand: Minimum assistance  Stand to Sit: Moderate assistance   Pt needed moderate assist x 1-2 person to turn to sit on the bed quickly due to bilateral knee buckling           Balance:  Sitting: Intact  Sitting - Static: Good (unsupported)  Sitting - Dynamic: Good (unsupported)  Standing: Impaired; With support  Standing - Static: Fair;Constant support  Standing - Dynamic : Fair;Constant support  Ambulation/Gait Training:  Distance (ft): 12 Feet (ft)  Assistive Device: Gait belt;Walker, rolling  Ambulation - Level of Assistance: Minimal assistance;Assist x2        Gait Abnormalities: Antalgic;Decreased step clearance; Step to gait;Trunk sway increased        Base of Support: Widened  Stance: Left decreased;Right decreased  Speed/Estrellita: Shuffled; Slow  Step Length: Right shortened;Left shortened         Activity Tolerance:   Fair  Please refer to the flowsheet for vital signs taken during this treatment.     After treatment patient left in no apparent distress:   Supine in bed, Call bell within reach and Side rails x 3    COMMUNICATION/COLLABORATION:   The patients plan of care was discussed with: Occupational Therapist and Registered Nurse    Ricky Henderson, PT, DPT   Time Calculation: 25 mins

## 2019-08-08 NOTE — PROGRESS NOTES
Ortho Daily Progress Note    8/8/2019  9:13 AM    POD:  3 Days Post-Op  S/P:  Procedure(s):  LEFT TOTAL KNEE REPLACEMENT    Afebrile/VSS, NAD, MSE at base line  Doing well without complaints of nausea  Pain well controlled  Calves soft/NTTP Bilaterally. Left calf swollen, supple. - Homans sign  Doppler obtained last night negative for DVT  Incision OK, no drainage or dehiscence. Several blisters on the lateral aspect of the incision- clean and dry- no signs of infection noted. Dressed with light sterile dressing and adaptic  Moving lower extremities well. Neurocirculatory exam intact and within normal range. Lab Results   Component Value Date/Time    HGB 11.1 (L) 08/06/2019 07:11 AM    INR 1.0 07/01/2019 12:43 PM     Recent Labs     08/06/19  0711 07/01/19  1243   CREA 1.12 1.13   BUN 23* 23*     Estimated Creatinine Clearance: 72.5 mL/min (based on SCr of 1.12 mg/dL).     PLAN: Spoke with Dr. Antonia Bahena of [facility]- OK to leave nunes in and follow up as an outpatient- H/O prostatectomy  DVT prophylaxis: ASA and Plavix  WBAT with PT-mobilization  Pain Control- tylenol, oxycodone, mobic  Plan to D/C home likely tomorrow      SAÚL Young

## 2019-08-08 NOTE — PROGRESS NOTES
MANASA: Discharge cancelled to [place] today. The patient is requesting to speak with Dr. Katherine Burnett to discuss SNF vs home tomorrow. The patient will discharge to [facility] SNF today at 1:30pm via [facility]. The discharge folder is located on the hard chart with report # and [facility] PCS form. Nursing will need to sent completed AVS, Scripts, Kardex, and Scammon. Umm RN was notified. CRM spoke with the patient and he is not ready to make a final decision for SNF vs home today/tomorrow. He stated he felt pushed and would like to talk with Dr. Katherine Burnett. CRM explained that the bed a [place] may not be available tomorrow. The patient understood. Transport cancelled until the patient speaks with Dr. Katherine Burnett. Frieda HAYS was notified.     Timo Palm, [address]

## 2019-08-08 NOTE — PROGRESS NOTES
Problem: Mobility Impaired (Adult and Pediatric)  Goal: *Acute Goals and Plan of Care (Insert Text)  Description  FUNCTIONAL STATUS PRIOR TO ADMISSION: Patient was modified independent using a Rollator for functional mobility. HOME SUPPORT PRIOR TO ADMISSION: The patient lived alone with minimal local support. Physical Therapy Goals  Initiated 8/6/2019    1. Patient will move from supine to sit and sit to supine , scoot up and down and roll side to side in bed with minimal assistance/contact guard assist within 4 days. 2. Patient will perform sit to stand with minimal assistance/contact guard assist within 4 days. 3. Patient will ambulate with minimal assistance/contact guard assist for 75 feet with the least restrictive device within 4 days. 4. Patient will ascend/descend 4 stairs with cane and one handrail(s) with minimal assistance/contact guard assist within 4 days. 5. Patient will perform home exercise program per protocol with independence within 4 days. 6. Patient will demonstrate AROM 0-90 degrees in operative joint within 4 days. Outcome: Progressing Towards Goal     PHYSICAL THERAPY TREATMENT  Patient: Kennedy Alvarado (74 y.o. male)  Date: 8/8/2019  Diagnosis: Primary osteoarthritis of left knee [M17.12] Primary osteoarthritis of left knee  Procedure(s) (LRB):  LEFT TOTAL KNEE REPLACEMENT (Left) 3 Days Post-Op  Precautions: Fall, WBAT  Chart, physical therapy assessment, plan of care and goals were reviewed. ASSESSMENT:  Pt received supine in bed agreeable to therapy and is slowly progressing towards goals. Completed bed mobility to sit EOB w/ modA x 2. Sat EOB w/ fair balance for approx 2 mins. Attempted sit>stand w/ maxA x 2 and pt was unable to maintain standing position. Did second trial of sit>stand w/ modA x 2 and pt was able to stay standing on the second attempt. Ambulated 3 ft to bedside chair w/ RW and Antionette x 2 w/ antalgic/shuffled gait pattern.  Completed stand>sit w/ modA x 2 and additional verbal cues for safety awareness to complete session. Will continue to follow for further gait training. Recommend SNF rehab upon d/c to return to baseline level of function. Progression toward goals:  ?    Improving appropriately and progressing toward goals  ? Improving slowly and progressing toward goals  ? Not making progress toward goals and plan of care will be adjusted     PLAN:  Patient continues to benefit from skilled intervention to address the above impairments. Continue treatment per established plan of care. Discharge Recommendations:  Onel Cruz  Further Equipment Recommendations for Discharge:  none      SUBJECTIVE:   Patient stated Angelica Durbin like to tell me what to do that why im single .     OBJECTIVE DATA SUMMARY:   Critical Behavior:  Neurologic State: Alert  Orientation Level: Oriented X4  Cognition: Appropriate decision making  Safety/Judgement: Decreased awareness of environment  Functional Mobility Training:  Bed Mobility:     Supine to Sit: Moderate assistance;Assist x2     Scooting: Stand-by assistance        Transfers:  Sit to Stand: Maximum assistance(second trial modA)  Stand to Sit: Moderate assistance                             Balance:  Sitting: Intact  Sitting - Static: Good (unsupported)  Sitting - Dynamic: Fair (occasional)  Standing: Impaired; With support  Standing - Static: Fair;Constant support  Standing - Dynamic : Fair;Constant support  Ambulation/Gait Training:  Distance (ft): 3 Feet (ft)  Assistive Device: Walker, rolling;Gait belt  Ambulation - Level of Assistance: Minimal assistance;Assist x2        Gait Abnormalities: Antalgic;Decreased step clearance        Base of Support: Widened  Stance: Left decreased;Right decreased  Speed/Estrellita: Delayed  Step Length: Left shortened;Right shortened      Therapeutic Exercises:   Pt education on TKA exercises per protocol  Pain:  Pain Scale 1: Numeric (0 - 10)  Pain Intensity 1: 5 Pain Intervention(s) 1: Medication (see MAR)  Activity Tolerance:   Good. VSS  Please refer to the flowsheet for vital signs taken during this treatment. After treatment:   ?    Patient left in no apparent distress sitting up in chair  ? Patient left in no apparent distress in bed  ? Call bell left within reach  ? Nursing notified  ? Caregiver present  ? Bed alarm activated    COMMUNICATION/COLLABORATION:   The patients plan of care was discussed with: Physical Therapist, Occupational Therapist and Registered Nurse    Megha Manzano, [facility]   Time Calculation: 23 mins    Regarding student involvement in patient care:  A student participated in this treatment session. Per CMS Medicare statements and APTA guidelines I certify that the following was true:  1. I was present and directly observed the entire session. 2. I made all skilled judgments and clinical decisions regarding care. 3. I am the practitioner responsible for assessment, treatment, and documentation.

## 2019-08-08 NOTE — PROGRESS NOTES
Problem: Self Care Deficits Care Plan (Adult)  Goal: *Acute Goals and Plan of Care (Insert Text)  Description    FUNCTIONAL STATUS PRIOR TO ADMISSION: Patient was modified independent using a Rollator for functional mobility (pt endorsed he completed ADL seated frequently). HOME SUPPORT: The patient lived alone with friends to provide assistance. Occupational Therapy Goals  Initiated 8/6/2019  1. Patient will perform bathing with minimal assistance/contact guard assist within 7 day(s). 2.  Patient will perform LB dressing moderate A level within 7 days. 3.  Patient will perform toilet transfers with minimal assistance/contact guard assist within 7 day(s). 4.  Patient will perform all aspects of toileting with minimal assistance/contact guard assist within 7 day(s). 5.  Patient will participate in upper extremity therapeutic exercise/activities with supervision/set-up for 10 minutes within 7 day(s). 6.  Patient will utilize energy conservation techniques during functional activities with verbal and visual cues within 7 day(s). Outcome: Progressing Towards Goal    OCCUPATIONAL THERAPY TREATMENT  Patient: Ciara Stokes (13 y.o. male)  Date: 8/8/2019  Diagnosis: Primary osteoarthritis of left knee [M17.12] Primary osteoarthritis of left knee  Procedure(s) (LRB):  LEFT TOTAL KNEE REPLACEMENT (Left) 3 Days Post-Op  Precautions: Fall, WBAT  Chart, occupational therapy assessment, plan of care, and goals were reviewed. ASSESSMENT  Based on the objective data described below, patient presents with mod A x2 for bed mobility and mod-max A x2 for functional transfers due to L TKA POD 3. Patient is currently limited primarily by anxiety and secondarily by neuropathy in B feet and a \"burning\" sensation in B thighs. Patient was educated on the importance of mobilizing and participating in ROM of the L knee and also encouraged to transfer to the [facility] when needed for toileting.  Patient is far from baseline and will benefit from [facility] upon discharge. Current Level of Function Impacting Discharge (ADLs): Patient requires max A x2 for functional transfers and is extremely limited by anxiety. Other factors to consider for discharge: Patient lives alone with limited assistance available. PLAN :  Patient continues to benefit from skilled intervention to address the above impairments. Continue treatment per established plan of care. to address goals. Recommend with staff: To [facility] with assist x2, out of bed 3x/day in chair. Recommend next OT session: Functional transfer to [facility], teach tub transfer    Recommendation for discharge: (in order for the patient to meet his/her long term goals)  Therapy up to 5 days/week in SNF setting    This discharge recommendation:  Has not yet been discussed the attending provider and/or case management    Equipment recommendations for successful discharge (if) home: to be determined by rehab facility       SUBJECTIVE:   Patient stated this is the first time I've been out of bed in days.     OBJECTIVE DATA SUMMARY:   Cognitive/Behavioral Status:  Neurologic State: Alert  Orientation Level: Oriented X4  Cognition: Appropriate decision making             Functional Mobility and Transfers for ADLs:  Bed Mobility:  Supine to Sit: Moderate assistance;Assist x2  Scooting: Stand-by assistance    Transfers:  Sit to Stand: Maximum assistance;Assist x2     Bed to Chair: Moderate assistance;Assist x2    Balance:  Sitting: Intact  Sitting - Static: Good (unsupported)  Sitting - Dynamic: Good (unsupported)  Standing: Impaired; With support  Standing - Static: Fair;Constant support  Standing - Dynamic : Fair;Constant support    ADL Intervention:   Patient was received supine in bed and participated in bed mobility to sitting with mod A x2. Patient sat EOB unsupported for ~5 minutes.  Patient required max A x2 for sit to stand transfer and was taught rocking and nose over toes for successful transfer. Patient required mod A x2 for stand to sit transfer in chair. Patient was left sitting in chair with call bell in reach and L leg propped up. Activity Tolerance:   Fair  Please refer to the flowsheet for vital signs taken during this treatment.     After treatment patient left in no apparent distress:   Sitting in chair and Call bell within reach    COMMUNICATION/COLLABORATION:   The patients plan of care was discussed with: Physical Therapist and Registered Nurse    SRIKANTH Perez  Time Calculation: 20 mins

## 2019-08-08 NOTE — DISCHARGE INSTRUCTIONS
Discharge Instructions Knee Replacement  Dr. Sol Mcdowell    Patient Name  Parker Osorio  Date of procedure  8/5/2019    Procedure  Procedure(s):  LEFT TOTAL KNEE REPLACEMENT  Surgeon  Surgeon(s) and Role:     Michael Lees MD - Primary  Date of discharge: 8/9/19  PCP: Janae Sierra MD    Follow up appointments   Follow up with Dr. Sol Mcdowell in 2 weeks. Call [phone] to make an appointment.  If home health has been arranged for you the agency will contact you to arrange dates/times for visits. Please call them if you do not hear from them within 24 hours after you are discharged    When to call your Orthopaedic Surgeon: Call [phone]. If you call after 5pm or on a weekend, the on call physician will be contacted   Unrelieved pain   Signs of infection-if your incision is red; continues to have drainage; drainage has a foul odor or if you have a persistent fever over 101 degrees   Signs of a blood clot in your leg-calf pain, tenderness, redness, swelling of lower leg    When to call your Primary Care Physician:   Concerns about medical conditions such as diabetes, high blood pressure, asthma, congestive heart failure   Call if blood sugars are elevated, persistent headache or dizziness, coughing or congestion, constipation or diarrhea, burning with urination, abnormal heart rate    When to call [id number] go to the nearest emergency room   Acute onset of chest pain, shortness of breath, difficulty breathing      Activity   Weight bearing as tolerated with walker or crutches.  Complete your Home Exercise Program daily as instructed by your therapist.    Tirso Cipro up every one hour and walk (except at night when sleeping)   Do not drive or operate heavy machinery      Incision Care   You will have staples in your knee incision; they will be removed at Dr. Marietta Pino office visit in 2 weeks   Keep a dressing (ABD and paper tape) on your incision and change daily.   Wash your hands thoroughly before changing the dressing. Once you incision is not draining, you may leave it open to air   You may shower and get your incision wet but do not submerge your incision under water in a bath tub, hot tub or swimming pool for 6 weeks after surgery. Preventing blood clots   Take Aspirin and Plavix as prescribed    Pain management   Keep ice wrap in place except when walking; changing gel packs every 4 hours   Lie down and elevate your leg on pillows for about 30 minutes after walking to decrease swelling and pain   Do ankle pumps (10 repetitions) every hour while awake and get up frequently to walk   Take Tylenol 1000 mg every 6 hours for 2 weeks    Take narcotic pain pill as prescribed if needed. Take with food; avoid alcohol while taking pain medication. Decrease the amount of narcotic pain medication as your pain lessens     Diet   Resume usual diet; drink plenty of fluids; eat foods high in fiber   Take over-the-counter stool softeners and laxatives to prevent constipation      Vargas catheter  Routine catheter care, keep in until visit with Dr. Dayton Armendariz which is next week. Call his office for additional instructions if needed.  [phone]

## 2019-08-08 NOTE — PROGRESS NOTES
Physical therapy:    Attempted PT session. Pt declining therapy at this time as he wanted to eat his lunch. Pt requested therapy return later today. Will defer and continue to follow.     Missy Bergman, PT, DPT

## 2019-08-08 NOTE — PROGRESS NOTES
Verbal shift change report given to [address] (oncoming nurse) by Yves Mcgrath (offgoing nurse). Report included the following information SBAR.

## 2019-08-08 NOTE — PROGRESS NOTES
Bedside and Verbal shift change report given to JULITO Salomon (oncoming nurse) by Heron Lee RN (offgoing nurse). Report included the following information SBAR, Kardex, Intake/Output, MAR and Recent Results.

## 2019-08-09 VITALS
RESPIRATION RATE: 16 BRPM | OXYGEN SATURATION: 98 % | WEIGHT: 211 LBS | HEART RATE: 91 BPM | DIASTOLIC BLOOD PRESSURE: 68 MMHG | BODY MASS INDEX: 29.54 KG/M2 | TEMPERATURE: 97.8 F | SYSTOLIC BLOOD PRESSURE: 112 MMHG | HEIGHT: 71 IN

## 2019-08-09 LAB
GLUCOSE BLD STRIP.AUTO-MCNC: 121 MG/DL (ref 65–100)
GLUCOSE BLD STRIP.AUTO-MCNC: 143 MG/DL (ref 65–100)
GLUCOSE BLD STRIP.AUTO-MCNC: 189 MG/DL (ref 65–100)
SERVICE CMNT-IMP: ABNORMAL

## 2019-08-09 PROCEDURE — 82962 GLUCOSE BLOOD TEST: CPT

## 2019-08-09 PROCEDURE — 97530 THERAPEUTIC ACTIVITIES: CPT

## 2019-08-09 PROCEDURE — 97116 GAIT TRAINING THERAPY: CPT

## 2019-08-09 PROCEDURE — 97535 SELF CARE MNGMENT TRAINING: CPT

## 2019-08-09 PROCEDURE — 74011250637 HC RX REV CODE- 250/637: Performed by: PHYSICIAN ASSISTANT

## 2019-08-09 PROCEDURE — 74011636637 HC RX REV CODE- 636/637: Performed by: PHYSICIAN ASSISTANT

## 2019-08-09 RX ADMIN — INSULIN LISPRO 2 UNITS: 100 INJECTION, SOLUTION INTRAVENOUS; SUBCUTANEOUS at 16:48

## 2019-08-09 RX ADMIN — ASPIRIN 81 MG: 81 TABLET ORAL at 08:57

## 2019-08-09 RX ADMIN — POLYETHYLENE GLYCOL 3350 17 G: 17 POWDER, FOR SOLUTION ORAL at 08:57

## 2019-08-09 RX ADMIN — OXYCODONE HYDROCHLORIDE 10 MG: 5 TABLET ORAL at 07:02

## 2019-08-09 RX ADMIN — OXYCODONE HYDROCHLORIDE 10 MG: 5 TABLET ORAL at 02:40

## 2019-08-09 RX ADMIN — FAMOTIDINE 20 MG: 20 TABLET ORAL at 17:25

## 2019-08-09 RX ADMIN — HYDROCHLOROTHIAZIDE 12.5 MG: 25 TABLET ORAL at 08:57

## 2019-08-09 RX ADMIN — OXYCODONE HYDROCHLORIDE 5 MG: 5 TABLET ORAL at 19:15

## 2019-08-09 RX ADMIN — INSULIN GLARGINE 15 UNITS: 100 INJECTION, SOLUTION SUBCUTANEOUS at 08:57

## 2019-08-09 RX ADMIN — OXYCODONE HYDROCHLORIDE 10 MG: 5 TABLET ORAL at 13:08

## 2019-08-09 RX ADMIN — METFORMIN HYDROCHLORIDE 500 MG: 500 TABLET ORAL at 08:57

## 2019-08-09 RX ADMIN — SENNOSIDES, DOCUSATE SODIUM 1 TABLET: 50; 8.6 TABLET, FILM COATED ORAL at 08:57

## 2019-08-09 RX ADMIN — CLOPIDOGREL BISULFATE 75 MG: 75 TABLET ORAL at 08:57

## 2019-08-09 RX ADMIN — OXYCODONE HYDROCHLORIDE 10 MG: 5 TABLET ORAL at 10:35

## 2019-08-09 RX ADMIN — PIOGLITAZONE 15 MG: 15 TABLET ORAL at 08:57

## 2019-08-09 RX ADMIN — METFORMIN HYDROCHLORIDE 500 MG: 500 TABLET ORAL at 16:48

## 2019-08-09 RX ADMIN — INSULIN LISPRO 2 UNITS: 100 INJECTION, SOLUTION INTRAVENOUS; SUBCUTANEOUS at 13:05

## 2019-08-09 RX ADMIN — ACETAMINOPHEN 1000 MG: 500 TABLET ORAL at 13:05

## 2019-08-09 RX ADMIN — SENNOSIDES, DOCUSATE SODIUM 1 TABLET: 50; 8.6 TABLET, FILM COATED ORAL at 17:25

## 2019-08-09 RX ADMIN — LOSARTAN POTASSIUM 50 MG: 50 TABLET ORAL at 08:57

## 2019-08-09 RX ADMIN — MELOXICAM 7.5 MG: 7.5 TABLET ORAL at 08:57

## 2019-08-09 RX ADMIN — Medication 10 ML: at 13:09

## 2019-08-09 RX ADMIN — ACETAMINOPHEN 1000 MG: 500 TABLET ORAL at 07:01

## 2019-08-09 RX ADMIN — OXYCODONE HYDROCHLORIDE 10 MG: 5 TABLET ORAL at 16:49

## 2019-08-09 RX ADMIN — ACETAMINOPHEN 1000 MG: 500 TABLET ORAL at 17:25

## 2019-08-09 RX ADMIN — FAMOTIDINE 20 MG: 20 TABLET ORAL at 08:57

## 2019-08-09 NOTE — PROGRESS NOTES
Dionne Fuelling Bundle Patient    MANASA: SNF-Referral sent to [facility]       [address] spoke with Nikki NICOLAS. She plans to work with the patient this am. The patient so far is not safe for discharge home alone. Dr. Sol Mcdowell prefers that the patient go home with [place]. CRM will follow for final plan. 10:58pm: CRM spoke with Stormy NICOLAS. The patient is not doing well enough to go home alone. The patient is requesting to go to SNF Rehab. The patient does not want to go to [place] now. He has requested to go to a facility on the [address] [facility]. CRM offered choice and the patient chose [facility]. Referral sent via [address] Will follow. [facility] has accepted the patient for SNF. CRM requested an [facility] discharge time for 2:30pm-5:30pm transport time confirmed. The patient will discharge today to [facility] for SNF at 5:30pm. The discharge folder is located on the hard chart with report # and [facility] PCS form. Nursing will need to send completed AVS, scripts, and Mars. Katiana Croft RN was notified. The patient is in agreement with the plan. IM letter received.      [place] [id number], [address]

## 2019-08-09 NOTE — PROGRESS NOTES
Problem: Self Care Deficits Care Plan (Adult)  Goal: *Acute Goals and Plan of Care (Insert Text)  Description    FUNCTIONAL STATUS PRIOR TO ADMISSION: Patient was modified independent using a Rollator for functional mobility (pt endorsed he completed ADL seated frequently). HOME SUPPORT: The patient lived alone with friends to provide assistance. Occupational Therapy Goals  Initiated 8/6/2019  1. Patient will perform bathing with minimal assistance/contact guard assist within 7 day(s). 2.  Patient will perform LB dressing moderate A level within 7 days. 3.  Patient will perform toilet transfers with minimal assistance/contact guard assist within 7 day(s). 4.  Patient will perform all aspects of toileting with minimal assistance/contact guard assist within 7 day(s). 5.  Patient will participate in upper extremity therapeutic exercise/activities with supervision/set-up for 10 minutes within 7 day(s). 6.  Patient will utilize energy conservation techniques during functional activities with verbal and visual cues within 7 day(s). Outcome: Progressing Towards Goal     OCCUPATIONAL THERAPY TREATMENT  Patient: Ciara Stokes (23 y.o. male)  Date: 8/9/2019  Diagnosis: Primary osteoarthritis of left knee [M17.12] Primary osteoarthritis of left knee  Procedure(s) (LRB):  LEFT TOTAL KNEE REPLACEMENT (Left) 4 Days Post-Op  Precautions: Fall, WBAT  Chart, occupational therapy assessment, plan of care, and goals were reviewed. ASSESSMENT  Based on the objective data described below, Pt is AAOx4, overall perseverates during ADLs but is easily redirection. Overall the patient required set/up assistance to minimal assistance for UB dressing, UB Bathing, and grooming. Anticipate moderate assistance for any LB dressing and bathing secondary to decreased functional mobility, balance, anxiety with standing and decreased BLE ROM and strength.  Patient is below his functional baseline which he stated was modified independent. Further conversation with the patient about prior level of function indicated the patient had stropped accessing his bathtub 1 month prior to admission and sponge bathing. Current Level of Function Impacting Discharge (ADLs): Mod A for LB ADLs, min A to set/up for UB ADLs, min A for functional mobility with RW for SPT to and from [facility], refuses further mobility. Other factors to consider for discharge: Patient lives at home alone and will have friends for support but needs to be a level of independent to be able to toilet, dress and access areas of his home prior to dc home safely. PLAN :  Patient continues to benefit from skilled intervention to address the above impairments. Continue treatment per established plan of care. to address goals. Recommend with staff: Krista Guzman as able and safe    Recommend next OT session: Standing ADLs, LB dressing education, strengthening activities     Recommendation for discharge: (in order for the patient to meet his/her long term goals)  Therapy up to 5 days/week in SNF setting versus home secondary to decreased assistance at home and decreased functional mobility while here     This discharge recommendation:  Has been made in collaboration with the attending provider and/or case management    Equipment recommendations for successful discharge (if) home: to be determined by rehab facility       SUBJECTIVE:   Patient stated my \"good leg\" has been so atrophied that I really don't feel comfortable on it.     OBJECTIVE DATA SUMMARY:   Cognitive/Behavioral Status:  Neurologic State: Alert  Orientation Level: Oriented X4  Cognition: Appropriate decision making  Perception: Appears intact  Perseveration: Perseverates during ADLS       Functional Mobility and Transfers for ADLs:      Transfers:  Sit<>stand: Min A from chair to [facility]      Balance:  Sitting: Intact; Without support  Standing: Impaired; With support  Standing - Static: Constant support;Fair(once fully upright, tends to lean forward at hps)  Standing - Dynamic : Constant support;Poor    ADL Intervention:    Patient received in his chair and set/up for UB bathing, dressing, and grooming. UB Dressing: Min A to help pull shirt down in the back  UB bathing: set/up assistance with basin infornt of the patient, able to access UB with help for back  Grooming: Set/Up in seated position, pt is able to shave and wash face/brush teeth    Set/up in seated secondary to patient decreased standing balance and anxiety with standing. Anticipate based on the patient's observed abilities during transfer he would be min to mod A for all of set/up tasks if needing to collect items or perform standing. Therapeutic Exercises:   SIT<>STAND: Min A with moderate encouragement needed. Pt needed reminders for hand placement and sqeuqnce of task   SPT to [facility]: min A with RW     Pain:  \"mild\" in L knee per patient     Activity Tolerance:   Good  Please refer to the flowsheet for vital signs taken during this treatment.     After treatment patient left in no apparent distress:   Call bell within reach and seated on [facility], RN informed      COMMUNICATION/COLLABORATION:   The patients plan of care was discussed with: Physical Therapist and Registered Nurse    Amparo Ramos  Time Calculation: 55 mins

## 2019-08-09 NOTE — PROGRESS NOTES
Bedside and Verbal shift change report given to Addis Brand RN (oncoming nurse) by Danika Mendoza RN (offgoing nurse). Report included the following information SBAR, Kardex, Intake/Output, MAR and Recent Results.

## 2019-08-09 NOTE — PROGRESS NOTES
Bedside and Verbal shift change report given to Sosa Ramirez RN (oncoming nurse) by Luisa Mcdermott RN (offgoing nurse). Report included the following information SBAR, Kardex, Intake/Output, MAR and Recent Results.

## 2019-08-09 NOTE — PROGRESS NOTES
Ortho  No complaint  afeb  Knee OK  Plan: D/C home            Leave nunes in per , to be removed as outpt

## 2019-08-09 NOTE — PROGRESS NOTES
Patient finishing working with PT stating \"I am not ready to work with you right now, I am too overwhelmed. \"     Patient discussing discharge options at home with the PT when entering room. Did state his preference may be to DC home. OT will attempt back later today if able. To DC home the patient will need to demonstrate basic functional transfers, toileting, dressing and showering tasks at modified independence to supervision level which is the assistance that he would have available at home. He would also need intensive HHOT to increase independence to baseline for self care and home safety.      Donato Gracia, MS OTR/L

## 2019-08-09 NOTE — PROGRESS NOTES
TRANSFER - OUT REPORT:    Verbal report given to [address] (name) on Kimberley Celaya  being transferred to [facility] (unit) for routine progression of care       Report consisted of patients Situation, Background, Assessment and   Recommendations(SBAR). Information from the following report(s) SBAR, Kardex, Intake/Output, MAR and Recent Results was reviewed with the receiving nurse. Lines: Vargas    Opportunity for questions and clarification was provided.       Patient transported with:   JUANCARLOS

## 2019-08-09 NOTE — PROGRESS NOTES
Patient seen for initial PT visit today, but all he would do was sit EOB. He talks at length about his frustrations and is emotional and tearful about his situation overall. Discussed with him the importance of mobility and safety at home. He agreed to practice gait training at 10:00. We will follow up with a full note and recommendations at that time. Note that patient has not been able to demonstrate mobility that would be safe to be alone at home, or even to be able to get into his home from a vehicle.       Kyle Flor, PT

## 2019-08-09 NOTE — PROGRESS NOTES
Problem: Mobility Impaired (Adult and Pediatric)  Goal: *Acute Goals and Plan of Care (Insert Text)  Description  FUNCTIONAL STATUS PRIOR TO ADMISSION: Patient was modified independent using a Rollator for functional mobility. HOME SUPPORT PRIOR TO ADMISSION: The patient lived alone with minimal local support. Physical Therapy Goals  Initiated 8/6/2019    1. Patient will move from supine to sit and sit to supine , scoot up and down and roll side to side in bed with minimal assistance/contact guard assist within 4 days. 2. Patient will perform sit to stand with minimal assistance/contact guard assist within 4 days. 3. Patient will ambulate with minimal assistance/contact guard assist for 75 feet with the least restrictive device within 4 days. 4. Patient will ascend/descend 4 stairs with cane and one handrail(s) with minimal assistance/contact guard assist within 4 days. 5. Patient will perform home exercise program per protocol with independence within 4 days. 6. Patient will demonstrate AROM 0-90 degrees in operative joint within 4 days. Outcome: Progressing Towards Goal   PHYSICAL THERAPY TREATMENT  Patient: Ke Park (30 y.o. male)  Date: 8/9/2019  Diagnosis: Primary osteoarthritis of left knee [M17.12] Primary osteoarthritis of left knee  Procedure(s) (LRB):  LEFT TOTAL KNEE REPLACEMENT (Left) 4 Days Post-Op  Precautions: Fall, WBAT  Chart, physical therapy assessment, plan of care and goals were reviewed. ASSESSMENT: patient seen for 2 PT sessions  Based on the objective data described below, patient continues to make very slow progress with his mobility. He is tearful and needs to be redirected repeatedly throughout session. Once he is able to stand, which takes mod assist due to his LE weakness bilaterally, he fatigues very quickly with attempts at gait. He steps are short and poorly controlled in both LEs.   He needed to sit urgently after about 8' of walking and then needed several minutes of rest to be able to walk back. He is not safe for discharge home even if he had 24 hour supervision, unless he had skilled caregivers who were able to provide hands on assist for all transfers and mobility out of the bed. He is quite weak in both legs and the post op pain in the L knee makes ambulation even more unstable. Patient agrees that he is not safe to be home alone. Discussed with , as well. Current Level of Function Impacting Discharge (mobility/balance): mod to max assist for any OOB mobility    Other factors to consider for discharge: lives alone, bilateral LE weakness, history of falls at home, poor social support system, emotional lability         PLAN :  Patient continues to benefit from skilled intervention to address the above impairments. Continue treatment per established plan of care. to address goals. Recommendation for discharge: (in order for the patient to meet his/her long term goals)  Therapy up to 5 days/week in SNF setting    This discharge recommendation:  Has been made in collaboration with the attending provider and/or case management    Equipment recommendations for successful discharge (if) home: to be determined by rehab facility       SUBJECTIVE:   Patient stated I just can't deal with all of this right now.     OBJECTIVE DATA SUMMARY:   Critical Behavior:  Neurologic State: Alert, Appropriate for age  Orientation Level: Oriented X4, Appropriate for age  Cognition: Appropriate decision making, Appropriate for age attention/concentration, Appropriate safety awareness, Follows commands  Safety/Judgement: Decreased awareness of environment  Functional Mobility Training:  Bed Mobility:     Supine to Sit: Modified independent  Sit to Supine: (up in chair after)           Transfers:  Sit to Stand: Moderate assistance; Adaptive equipment; Additional time(very unstable getting to standing)  Stand to Sit: Moderate assistance; Adaptive equipment; Additional time(poor control of descent)        Bed to Chair: Moderate assistance; Adaptive equipment; Additional time                    Balance:  Sitting: Intact; Without support  Standing: Impaired; With support  Standing - Static: Constant support;Fair(once fully upright, tends to lean forward at hps)  Standing - Dynamic : Constant support;Poor  Ambulation/Gait Training:  Distance (ft): 8 Feet (ft)(twice with a seated rest between)  Assistive Device: Gait belt;Walker, rolling  Ambulation - Level of Assistance: Moderate assistance; Adaptive equipment; Additional time        Gait Abnormalities: Antalgic;Decreased step clearance; Path deviations; Step to gait;Trunk sway increased; Shuffling gait(very poor foot clearance bilaterally, weakness bilaterally)  Right Side Weight Bearing: Full  Left Side Weight Bearing: As tolerated  Base of Support: Center of gravity altered;Narrowed  Stance: Left decreased(both knees in slight flexion in stance)  Speed/Estrellita: Pace decreased (<100 feet/min); Shuffled  Step Length: Left shortened;Right shortened  Swing Pattern: (neither LE demonstrates a normal swing)                 Stairs: Therapeutic Exercises:     Pain Rating:  Patient reports pain is and weakness are limiting factors in ambulation, reminded to ask for pain meds when they are available    Activity Tolerance:   Fair and requires frequent rest breaks  Please refer to the flowsheet for vital signs taken during this treatment.     After treatment patient left in no apparent distress:   Sitting in chair, Call bell within reach and RN aware     COMMUNICATION/COLLABORATION:   The patients plan of care was discussed with: Occupational Therapist, Registered Nurse and     Stormy Colorado, PT   Time Calculation: 38 mins

## 2019-08-12 NOTE — NURSE NAVIGATOR
[facility]  [facility] [place] [facility] Handoff     FROM:                                TO: [facility]                                                      ([address] [facility] or Facility name)  [address]  [address] [address]  Dept: [address]: [phone]                                      Room#:  573/01                                                       Nurse Navigator:  Cheyenne De La Garza RN         SITUATION      ASAScore: ASA 3 - Patient with moderate systemic disease with functional limitations    Admitted:  8/5/2019  Hospital Day: 5      Attending Provider:  No att. providers found     Consultations:  None    PCP:  Bry Jeffery MD   [phone]     Admitting Dx:  Primary osteoarthritis of left knee [M17.12]       Principal Problem:    Primary osteoarthritis of left knee (8/5/2019)      7 Days Post-Op of   Procedure(s):  LEFT TOTAL KNEE REPLACEMENT   BY: Josh Mckeon MD             ON: 8/5/2019                  Code Status: Prior             Advance Directive? Yes Not W Pt (Send w/patient)     Isolation:  There are currently no Active Isolations       MDRO: No current active infections    BACKGROUND     Allergies: Allergies   Allergen Reactions    Ddavp [Desmopressin] Other (comments)     KIDNEY ISSUES.        Past Medical History:   Diagnosis Date    Arthritis     CAD (coronary artery disease) 03/2018    CABG, MI    Cancer ([facility] [address] 2012    Prostate    Chronic pain     LEFT KNEE    Diabetes ([facility] [address]     NIDDM    Ill-defined condition     ISSUES WITH CLOTTING FACTOR VIII    Psychiatric disorder     DEPRESSION    Von Willebrand disease ([facility] [address]        Past Surgical History:   Procedure Laterality Date    ABDOMEN SURGERY PROC UNLISTED  1954    RIGHT CONGENITAL INGUINAL HERNIA REPAIR    ABDOMEN SURGERY PROC UNLISTED  1991    LEFT INGUINAL HERNIA REPAIR    ABDOMEN SURGERY [address] UNLISTED      SIGMOIDOSCOPY-FOR HEMMORIODS.  CABG, ARTERY-VEIN, FOUR  2018    CABG    HX APPENDECTOMY  1979    HX ARTERIAL BYPASS      HX CATARACT REMOVAL Bilateral ,     HX GI  2017    COLONOSCOPY WITH NO POLYPS    HX HEENT  1949    T&A    HX HEENT  1972    EXTRACTION OF WISDOM TEETH X 4 (2 SURGERIES 2 TEETH AT A TIME)    HX ORTHOPAEDIC  2006    RIGHT HAMMER TOES X 4 FIXED    HX OTHER SURGICAL  1963    RIGHT INNER THIGH CEBACIOUS CYST REMOVED    HX PROSTATECTOMY  2012    RADICAL PROSTATECTOMY    TOTAL HIP ARTHROPLASTY Right 2017       Prior to Admission Medications   Prescriptions Last Dose Informant Patient Reported? Taking? OTHER 2019  Yes Yes   Sig: Take 1 Tab by mouth daily. CATECOSTIM (HERBAL SUPPLEMENT)   TESTOSTERONE IM 2019  Yes Yes   Si Dose by IntraMUSCular route every twenty-eight (28) days. aspirin delayed-release 81 mg tablet 2019 at Unknown time  Yes Yes   Sig: Take 81 mg by mouth daily. clopidogrel (PLAVIX) 75 mg tab 2019  Yes No   Sig: Take 75 mg by mouth daily. diazePAM (VALIUM) 5 mg tablet Unknown at Unknown time  Yes No   Sig: Take 5 mg by mouth as needed for Anxiety. fish,flaxseed oil-e.prim-bcurr (FISH, FLAX ANDBORAGE OIL,PRIM,) 400-400-200 mg cap 2019 at Unknown time  Yes No   Sig: Take 2 Caps by mouth daily. hydroCHLOROthiazide (HYDRODIURIL) 12.5 mg tablet 2019 at Unknown time  Yes Yes   Sig: Take 12.5 mg by mouth daily as needed. insulin degludec (TRESIBA U-100 INSULIN SC) 8/3/2019  Yes Yes   Si Units by SubCUTAneous route every evening. irbesartan (AVAPRO) 150 mg tablet 2019  Yes Yes   Sig: Take 150 mg by mouth daily. IN AM   metFORMIN (GLUCOPHAGE) 500 mg tablet 2019  Yes Yes   Sig: Take 500 mg by mouth two (2) times daily (with meals). multivitamin (ONE A DAY) tablet 2019  Yes Yes   Sig: Take 1 Tab by mouth daily.    oxyCODONE IR (ROXICODONE) 10 mg tab immediate release tablet   Yes No   Sig: Take 10 mg by mouth every four (4) hours as needed. PT MAY TAKE 1/2 TO 1 TAB AS NEEDED   pioglitazone (ACTOS) 15 mg tablet 8/3/2019  Yes Yes   Sig: Take 15 mg by mouth daily. potassium 99 mg tablet 8/2/2019  Yes Yes   Sig: Take 99 mg by mouth daily. PT TAKES 2 TABS DAILY FOR LEG CRAMPS   simvastatin (ZOCOR) 40 mg tablet 8/2/2019  Yes Yes   Sig: Take  by mouth nightly. Facility-Administered Medications: None       Vaccinations: There is no immunization history on file for this patient. ASSESSMENT   Age: 77 y.o. Gender: male        Height: Height: 5' 11\" (180.3 cm)                    Weight:Weight: 95.7 kg (211 lb)     No data found. Active Orders   There are no active orders of the following types: Diet. Orientation: Orientation Level: Oriented X4    Active Lines/Drains:  (Peg Tube / Vargas / CL or S/L?):no    Urinary Status: Vargas, Draining, Retention      Last BM: Last Bowel Movement Date: 08/08/19     Skin Integrity: Incision (comment)(left kne)             Mobility: Slightly limited   Weight Bearing Status: WBAT (Weight Bearing as Tolerated)      Gait Training  Assistive Device: Gait belt, Walker, rolling  Ambulation - Level of Assistance: Moderate assistance, Adaptive equipment, Additional time  Distance (ft): 8 Feet (ft)(twice with a seated rest between)     On Anticoagulation?  YES  Aspirin  81 mg daily; Plavix 75 mg daily                                       Pain Medications given:  Oxycodone 5 mg                                   Lab Results   Component Value Date/Time    Glucose 160 (H) 08/06/2019 07:11 AM    Hemoglobin A1c 8.1 (H) 07/01/2019 12:43 PM    INR 1.0 07/01/2019 12:43 PM    INR 1.0 10/05/2017 09:09 AM    HGB 11.1 (L) 08/06/2019 07:11 AM    HGB 14.2 07/01/2019 12:43 PM    HGB 14.8 05/24/2019 12:40 AM    HGB 12.4 10/31/2017 04:47 AM       Readmission Risks:  Score:         RECOMMENDATION     See After Visit Summary (AVS) for:  · Discharge instructions  · After [address]   · Medication Reconciliation          [facility] Orthopaedic Nurse Navigator  NIECY Johnson, RN-BC       Office  [phone]  Cell      [phone]  Fax      [phone]  [email or web address]             . [email or web address]

## 2019-08-12 NOTE — DISCHARGE SUMMARY
Orthopedic Service Discharge Summary    Patient ID:  Fina Malin  [id number]  male  77 y.o.  1950    Admit date: 2019    Discharge date and time: 2019  7:22 PM     Admitting Physician: Delores Ludwig MD     Discharge Physician: Delores Ludwig MD    Consulting Physician(s): Treatment Team: Utilization Review: Arsenio Moseley RN; Care Manager: Raul Hu BSW    Date of Surgery: 2019     Preoperative Diagnosis:  DEGENERATIVE JOINT DISEASE LEFT KNEE    Postoperative Diagnosis: DEGENERATIVE JOINT DISEASE LEFT KNEE    Procedure(s): Procedure(s):  LEFT TOTAL KNEE REPLACEMENT    Surgeon: Surgeon(s) and Role:     Amanda Coy MD - Primary      Anesthesia:  General    Preoperative Medical Clearance:                           HPI:  Pt is a 77 y.o. male who has a history of Primary osteoarthritis of left knee [M17.12]  with pain and limitations of activities of daily living who presents at this time for a left TKR following the failure of conservative management. PMH:   Past Medical History:   Diagnosis Date    Arthritis     CAD (coronary artery disease) 2018    CABG, MI    Cancer ([facility] [address] 2012    Prostate    Chronic pain     LEFT KNEE    Diabetes ([facility] [address]     NIDDM    Ill-defined condition     ISSUES WITH CLOTTING FACTOR VIII    Psychiatric disorder     DEPRESSION    Von Willebrand disease ([facility] [address]        Medications upon admission :   Prior to Admission Medications   Prescriptions Last Dose Informant Patient Reported? Taking? OTHER 2019  Yes Yes   Sig: Take 1 Tab by mouth daily. CATECOSTIM (HERBAL SUPPLEMENT)   TESTOSTERONE IM 2019  Yes Yes   Si Dose by IntraMUSCular route every twenty-eight (28) days. aspirin delayed-release 81 mg tablet 2019 at Unknown time  Yes Yes   Sig: Take 81 mg by mouth daily. clopidogrel (PLAVIX) 75 mg tab 2019  Yes No   Sig: Take 75 mg by mouth daily.    diazePAM (VALIUM) 5 mg tablet Unknown at Unknown time  Yes No Sig: Take 5 mg by mouth as needed for Anxiety. fish,flaxseed oil-e.prim-bcurr (FISH, FLAX ANDBORAGE OIL,PRIM,) 400-400-200 mg cap 2019 at Unknown time  Yes No   Sig: Take 2 Caps by mouth daily. hydroCHLOROthiazide (HYDRODIURIL) 12.5 mg tablet 2019 at Unknown time  Yes Yes   Sig: Take 12.5 mg by mouth daily as needed. insulin degludec (TRESIBA U-100 INSULIN SC) 8/3/2019  Yes Yes   Si Units by SubCUTAneous route every evening. irbesartan (AVAPRO) 150 mg tablet 2019  Yes Yes   Sig: Take 150 mg by mouth daily. IN AM   metFORMIN (GLUCOPHAGE) 500 mg tablet 2019  Yes Yes   Sig: Take 500 mg by mouth two (2) times daily (with meals). multivitamin (ONE A DAY) tablet 2019  Yes Yes   Sig: Take 1 Tab by mouth daily. oxyCODONE IR (ROXICODONE) 10 mg tab immediate release tablet   Yes No   Sig: Take 10 mg by mouth every four (4) hours as needed. PT MAY TAKE 1/2 TO 1 TAB AS NEEDED   pioglitazone (ACTOS) 15 mg tablet 8/3/2019  Yes Yes   Sig: Take 15 mg by mouth daily. potassium 99 mg tablet 2019  Yes Yes   Sig: Take 99 mg by mouth daily. PT TAKES 2 TABS DAILY FOR LEG CRAMPS   simvastatin (ZOCOR) 40 mg tablet 2019  Yes Yes   Sig: Take  by mouth nightly. Facility-Administered Medications: None        Allergies: Allergies   Allergen Reactions    Ddavp [Desmopressin] Other (comments)     KIDNEY ISSUES. Hospital Course: The patient underwent surgery. Complications:  None; patient tolerated the procedure well. Was taken to the PACU in stable condition and then transferred to the Orthopedics floor.       Condition on discharge: good    Perioperative Antibiotics:  Ancef     Postoperative Pain Management:  Acetaminophen, mobic, oxycodone    DVT Prophylaxis: ASA and Plavix , SCDs    Postoperative transfusions:     none Banked PRBCs     Post Op complications: none    Hemoglobin at discharge:    Lab Results   Component Value Date/Time    HGB 11.1 (L) 2019 07:11 AM    INR 1.0 07/01/2019 12:43 PM       Dressing was changed on POD # 2. Incision - clean, dry and intact. No significant erythema or swelling. Neurovascular exam within normal limits. Wound appears to be healing without any evidence of infection. Physical Therapy started on the day following surgery and progressed to ambulation with the aid of a rolling walker for distances of 8 feet with moderate assistance. Range of motion  limited by pain. At the time of discharge, able to go up and down stairs and had understanding of precautions needed following surgery. Discharged to: SNF ([facility]). Discharge instructions:  -See Full Summary of discharge instructions attached  -Anticoagulate with ASA and Plavix  -Resume pre hospital diet            -Resume home medications   Discharge Medication List as of 8/9/2019  5:34 PM      START taking these medications    Details   acetaminophen (TYLENOL) 500 mg tablet Take 2 Tabs by mouth every six (6) hours. , Print, Disp-120 Tab, R-0      meloxicam (MOBIC) 7.5 mg tablet Take 1 Tab by mouth daily. , Print, Disp-30 Tab, R-0      senna-docusate (PERICOLACE) 8.6-50 mg per tablet Take 1 Tab by mouth two (2) times a day., Print, Disp-20 Tab, R-0         CONTINUE these medications which have CHANGED    Details   oxyCODONE IR (ROXICODONE) 5 mg immediate release tablet Take 1-2 Tabs by mouth every four (4) hours as needed (severe pain) for up to 5 days. Max Daily Amount: 60 mg., Print, Disp-60 Tab, R-0         CONTINUE these medications which have NOT CHANGED    Details   pioglitazone (ACTOS) 15 mg tablet Take 15 mg by mouth daily. , Historical Med      hydroCHLOROthiazide (HYDRODIURIL) 12.5 mg tablet Take 12.5 mg by mouth daily as needed., Historical Med      potassium 99 mg tablet Take 99 mg by mouth daily. PT TAKES 2 TABS DAILY FOR LEG CRAMPS, Historical Med      aspirin delayed-release 81 mg tablet Take 81 mg by mouth daily. , Historical Med      multivitamin (ONE A DAY) tablet Take 1 Tab by mouth daily. , Historical Med      OTHER Take 1 Tab by mouth daily. CATECOSTIM (HERBAL SUPPLEMENT), Historical Med      insulin degludec (TRESIBA U-100 INSULIN SC) 20 Units by SubCUTAneous route every evening., Historical Med      TESTOSTERONE IM 1 Dose by IntraMUSCular route every twenty-eight (28) days. , Historical Med      simvastatin (ZOCOR) 40 mg tablet Take  by mouth nightly., Historical Med      metFORMIN (GLUCOPHAGE) 500 mg tablet Take 500 mg by mouth two (2) times daily (with meals). , Historical Med      irbesartan (AVAPRO) 150 mg tablet Take 150 mg by mouth daily. IN AM, Historical Med      clopidogrel (PLAVIX) 75 mg tab Take 75 mg by mouth daily. , Historical Med      diazePAM (VALIUM) 5 mg tablet Take 5 mg by mouth as needed for Anxiety. , Historical Med         STOP taking these medications       apixaban (ELIQUIS) 2.5 mg tablet Comments:   Reason for Stopping:         fish,flaxseed oil-e.prim-bcurr (FISH, FLAX Nigel Castillo,) 400-400-200 mg cap Comments:   Reason for Stopping:            per medical continuation form  -Discharge activity: Activity as tolerated  -Ambulate with Walkers, Type: Rolling Walker, appropriate total joint protocol  -Wound Care Keep wound clean and dry, Reinforce dressing PRN, Ice to area for comfort and As directed  -Follow up in office in 2 weeks      Signed:  Baltazar Jones PA-C  8/12/2019  1:25 PM        No att. providers found

## 2019-08-15 ENCOUNTER — PATIENT OUTREACH (OUTPATIENT)
Dept: CASE MANAGEMENT | Age: 69
End: 2019-08-15

## 2019-08-15 NOTE — PROGRESS NOTES
Community Care Team documentation for patient in [facility]  Initial Follow Up       Patient was discharged to [address] [facility], [facility]. Information included in this progress note has been provided to SNF. Hospital Admission and Diagnosis: [facility] 8/5-8/9 Primary osteoarthritis of left knee, S/P LEFT TOTAL KNEE REPLACEMENT      RRAT Score: 8        Advance Care Planning: Not on file       PCP : Natali Turner MD    Spoke with SNF team.  Provided needed hospital follow up appointments:  Wilfredo Kevin MD in 2 weeks call for appt [phone] ; Routine catheter care, keep in until visit with Dr. Salvador Alvarez next week, call for appt [phone]. SNF Medical update:   PT/OT update: Currently stand by assist with bed mobility. Min assist with transfers. Ambulating 15ft with RW and contact guard assist. Barrier: Limited motivation. Barrier: Anxiety. LOS/ Disposition: LOS TBD. Goal to return home alone with family and friends support.  TBD. Community Care Team will follow up weekly with Onel Anthony until discharge. Medications were not reconciled and general patient assessment was not completed during this [facility] outreach.

## 2019-08-22 ENCOUNTER — PATIENT OUTREACH (OUTPATIENT)
Dept: CASE MANAGEMENT | Age: 69
End: 2019-08-22

## 2019-08-29 ENCOUNTER — PATIENT OUTREACH (OUTPATIENT)
Dept: CASE MANAGEMENT | Age: 69
End: 2019-08-29

## 2021-10-31 ENCOUNTER — HOSPITAL ENCOUNTER (INPATIENT)
Age: 71
LOS: 9 days | Discharge: HOME HEALTH | DRG: 239 | End: 2021-11-09
Attending: EMERGENCY MEDICINE | Admitting: INTERNAL MEDICINE
Payer: MEDICARE

## 2021-10-31 ENCOUNTER — APPOINTMENT (OUTPATIENT)
Dept: GENERAL RADIOLOGY | Age: 71
DRG: 239 | End: 2021-10-31
Attending: EMERGENCY MEDICINE
Payer: MEDICARE

## 2021-10-31 DIAGNOSIS — D68.00 VON WILLEBRAND DISEASE: ICD-10-CM

## 2021-10-31 DIAGNOSIS — E11.628 DIABETIC FOOT INFECTION (HCC): Primary | ICD-10-CM

## 2021-10-31 DIAGNOSIS — L08.9 DIABETIC FOOT INFECTION (HCC): Primary | ICD-10-CM

## 2021-10-31 DIAGNOSIS — N18.31 TYPE 2 DIABETES MELLITUS WITH STAGE 3A CHRONIC KIDNEY DISEASE, WITHOUT LONG-TERM CURRENT USE OF INSULIN (HCC): ICD-10-CM

## 2021-10-31 DIAGNOSIS — I25.10 CORONARY ARTERY DISEASE INVOLVING NATIVE CORONARY ARTERY OF NATIVE HEART WITHOUT ANGINA PECTORIS: ICD-10-CM

## 2021-10-31 DIAGNOSIS — E11.22 TYPE 2 DIABETES MELLITUS WITH STAGE 3A CHRONIC KIDNEY DISEASE, WITHOUT LONG-TERM CURRENT USE OF INSULIN (HCC): ICD-10-CM

## 2021-10-31 DIAGNOSIS — Z01.818 PRE-OP EVALUATION: ICD-10-CM

## 2021-10-31 DIAGNOSIS — N18.30 STAGE 3 CHRONIC KIDNEY DISEASE, UNSPECIFIED WHETHER STAGE 3A OR 3B CKD (HCC): ICD-10-CM

## 2021-10-31 PROBLEM — E11.621 DIABETIC FOOT ULCER (HCC): Status: ACTIVE | Noted: 2021-10-31

## 2021-10-31 PROBLEM — L97.509 DIABETIC FOOT ULCER (HCC): Status: ACTIVE | Noted: 2021-10-31

## 2021-10-31 LAB
ALBUMIN SERPL-MCNC: 2.5 G/DL (ref 3.5–5)
ALBUMIN/GLOB SERPL: 0.5 {RATIO} (ref 1.1–2.2)
ALP SERPL-CCNC: 170 U/L (ref 45–117)
ALT SERPL-CCNC: 51 U/L (ref 12–78)
ANION GAP SERPL CALC-SCNC: 7 MMOL/L (ref 5–15)
AST SERPL-CCNC: 44 U/L (ref 15–37)
BASOPHILS # BLD: 0.1 K/UL (ref 0–0.1)
BASOPHILS NFR BLD: 1 % (ref 0–1)
BILIRUB SERPL-MCNC: 0.5 MG/DL (ref 0.2–1)
BUN SERPL-MCNC: 23 MG/DL (ref 6–20)
BUN/CREAT SERPL: 15 (ref 12–20)
CALCIUM SERPL-MCNC: 8.5 MG/DL (ref 8.5–10.1)
CHLORIDE SERPL-SCNC: 100 MMOL/L (ref 97–108)
CO2 SERPL-SCNC: 24 MMOL/L (ref 21–32)
COMMENT, HOLDF: NORMAL
CREAT SERPL-MCNC: 1.52 MG/DL (ref 0.7–1.3)
DIFFERENTIAL METHOD BLD: ABNORMAL
EOSINOPHIL # BLD: 0.1 K/UL (ref 0–0.4)
EOSINOPHIL NFR BLD: 1 % (ref 0–7)
ERYTHROCYTE [DISTWIDTH] IN BLOOD BY AUTOMATED COUNT: 13.3 % (ref 11.5–14.5)
GLOBULIN SER CALC-MCNC: 4.6 G/DL (ref 2–4)
GLUCOSE SERPL-MCNC: 312 MG/DL (ref 65–100)
HCT VFR BLD AUTO: 33 % (ref 36.6–50.3)
HGB BLD-MCNC: 11.1 G/DL (ref 12.1–17)
IMM GRANULOCYTES # BLD AUTO: 0.2 K/UL (ref 0–0.04)
IMM GRANULOCYTES NFR BLD AUTO: 1 % (ref 0–0.5)
LACTATE SERPL-SCNC: 2 MMOL/L (ref 0.4–2)
LYMPHOCYTES # BLD: 0.9 K/UL (ref 0.8–3.5)
LYMPHOCYTES NFR BLD: 8 % (ref 12–49)
MCH RBC QN AUTO: 32.1 PG (ref 26–34)
MCHC RBC AUTO-ENTMCNC: 33.6 G/DL (ref 30–36.5)
MCV RBC AUTO: 95.4 FL (ref 80–99)
MONOCYTES # BLD: 1.1 K/UL (ref 0–1)
MONOCYTES NFR BLD: 9 % (ref 5–13)
NEUTS SEG # BLD: 9.4 K/UL (ref 1.8–8)
NEUTS SEG NFR BLD: 80 % (ref 32–75)
NRBC # BLD: 0 K/UL (ref 0–0.01)
NRBC BLD-RTO: 0 PER 100 WBC
PLATELET # BLD AUTO: 250 K/UL (ref 150–400)
PMV BLD AUTO: 10.4 FL (ref 8.9–12.9)
POTASSIUM SERPL-SCNC: 5.1 MMOL/L (ref 3.5–5.1)
PROCALCITONIN SERPL-MCNC: 0.11 NG/ML
PROT SERPL-MCNC: 7.1 G/DL (ref 6.4–8.2)
RBC # BLD AUTO: 3.46 M/UL (ref 4.1–5.7)
SAMPLES BEING HELD,HOLD: NORMAL
SODIUM SERPL-SCNC: 131 MMOL/L (ref 136–145)
WBC # BLD AUTO: 11.7 K/UL (ref 4.1–11.1)

## 2021-10-31 PROCEDURE — 82550 ASSAY OF CK (CPK): CPT

## 2021-10-31 PROCEDURE — 83605 ASSAY OF LACTIC ACID: CPT

## 2021-10-31 PROCEDURE — 36415 COLL VENOUS BLD VENIPUNCTURE: CPT

## 2021-10-31 PROCEDURE — 80053 COMPREHEN METABOLIC PANEL: CPT

## 2021-10-31 PROCEDURE — 87040 BLOOD CULTURE FOR BACTERIA: CPT

## 2021-10-31 PROCEDURE — 83036 HEMOGLOBIN GLYCOSYLATED A1C: CPT

## 2021-10-31 PROCEDURE — 65660000000 HC RM CCU STEPDOWN

## 2021-10-31 PROCEDURE — 99284 EMERGENCY DEPT VISIT MOD MDM: CPT

## 2021-10-31 PROCEDURE — 84145 PROCALCITONIN (PCT): CPT

## 2021-10-31 PROCEDURE — 73630 X-RAY EXAM OF FOOT: CPT

## 2021-10-31 PROCEDURE — 85025 COMPLETE CBC W/AUTO DIFF WBC: CPT

## 2021-10-31 RX ORDER — HEPARIN SODIUM 5000 [USP'U]/ML
5000 INJECTION, SOLUTION INTRAVENOUS; SUBCUTANEOUS EVERY 8 HOURS
Status: DISCONTINUED | OUTPATIENT
Start: 2021-11-01 | End: 2021-11-02

## 2021-10-31 RX ORDER — ONDANSETRON 2 MG/ML
4 INJECTION INTRAMUSCULAR; INTRAVENOUS
Status: DISCONTINUED | OUTPATIENT
Start: 2021-10-31 | End: 2021-11-09 | Stop reason: HOSPADM

## 2021-10-31 RX ORDER — ACETAMINOPHEN 325 MG/1
650 TABLET ORAL
Status: DISCONTINUED | OUTPATIENT
Start: 2021-10-31 | End: 2021-11-09 | Stop reason: HOSPADM

## 2021-10-31 RX ORDER — DEXTROSE 50 % IN WATER (D50W) INTRAVENOUS SYRINGE
12.5-25 AS NEEDED
Status: DISCONTINUED | OUTPATIENT
Start: 2021-10-31 | End: 2021-11-09 | Stop reason: HOSPADM

## 2021-10-31 RX ORDER — ACETAMINOPHEN 650 MG/1
650 SUPPOSITORY RECTAL
Status: DISCONTINUED | OUTPATIENT
Start: 2021-10-31 | End: 2021-11-09 | Stop reason: HOSPADM

## 2021-10-31 RX ORDER — ENOXAPARIN SODIUM 100 MG/ML
40 INJECTION SUBCUTANEOUS DAILY
Status: DISCONTINUED | OUTPATIENT
Start: 2021-11-01 | End: 2021-10-31

## 2021-10-31 RX ORDER — SODIUM CHLORIDE 0.9 % (FLUSH) 0.9 %
5-40 SYRINGE (ML) INJECTION AS NEEDED
Status: DISCONTINUED | OUTPATIENT
Start: 2021-10-31 | End: 2021-11-09 | Stop reason: HOSPADM

## 2021-10-31 RX ORDER — HYDROMORPHONE HYDROCHLORIDE 1 MG/ML
1 INJECTION, SOLUTION INTRAMUSCULAR; INTRAVENOUS; SUBCUTANEOUS
Status: DISCONTINUED | OUTPATIENT
Start: 2021-10-31 | End: 2021-11-09 | Stop reason: HOSPADM

## 2021-10-31 RX ORDER — ONDANSETRON 4 MG/1
4 TABLET, ORALLY DISINTEGRATING ORAL
Status: DISCONTINUED | OUTPATIENT
Start: 2021-10-31 | End: 2021-11-09 | Stop reason: HOSPADM

## 2021-10-31 RX ORDER — INSULIN LISPRO 100 [IU]/ML
INJECTION, SOLUTION INTRAVENOUS; SUBCUTANEOUS
Status: DISCONTINUED | OUTPATIENT
Start: 2021-11-01 | End: 2021-11-09 | Stop reason: HOSPADM

## 2021-10-31 RX ORDER — SODIUM CHLORIDE 0.9 % (FLUSH) 0.9 %
5-40 SYRINGE (ML) INJECTION EVERY 8 HOURS
Status: DISCONTINUED | OUTPATIENT
Start: 2021-10-31 | End: 2021-11-09 | Stop reason: HOSPADM

## 2021-10-31 RX ORDER — MAGNESIUM SULFATE 100 %
4 CRYSTALS MISCELLANEOUS AS NEEDED
Status: DISCONTINUED | OUTPATIENT
Start: 2021-10-31 | End: 2021-11-09 | Stop reason: HOSPADM

## 2021-10-31 RX ORDER — VANCOMYCIN 2 GRAM/500 ML IN 0.9 % SODIUM CHLORIDE INTRAVENOUS
2000
Status: COMPLETED | OUTPATIENT
Start: 2021-10-31 | End: 2021-11-01

## 2021-10-31 RX ORDER — POLYETHYLENE GLYCOL 3350 17 G/17G
17 POWDER, FOR SOLUTION ORAL DAILY PRN
Status: DISCONTINUED | OUTPATIENT
Start: 2021-10-31 | End: 2021-11-09 | Stop reason: HOSPADM

## 2021-11-01 ENCOUNTER — APPOINTMENT (OUTPATIENT)
Dept: MRI IMAGING | Age: 71
DRG: 239 | End: 2021-11-01
Attending: PODIATRIST
Payer: MEDICARE

## 2021-11-01 ENCOUNTER — APPOINTMENT (OUTPATIENT)
Dept: VASCULAR SURGERY | Age: 71
DRG: 239 | End: 2021-11-01
Attending: INTERNAL MEDICINE
Payer: MEDICARE

## 2021-11-01 ENCOUNTER — ANESTHESIA EVENT (OUTPATIENT)
Dept: SURGERY | Age: 71
DRG: 239 | End: 2021-11-01
Payer: MEDICARE

## 2021-11-01 ENCOUNTER — APPOINTMENT (OUTPATIENT)
Dept: CT IMAGING | Age: 71
DRG: 239 | End: 2021-11-01
Attending: PHYSICIAN ASSISTANT
Payer: MEDICARE

## 2021-11-01 ENCOUNTER — APPOINTMENT (OUTPATIENT)
Dept: GENERAL RADIOLOGY | Age: 71
DRG: 239 | End: 2021-11-01
Attending: PODIATRIST
Payer: MEDICARE

## 2021-11-01 ENCOUNTER — ANESTHESIA (OUTPATIENT)
Dept: SURGERY | Age: 71
DRG: 239 | End: 2021-11-01
Payer: MEDICARE

## 2021-11-01 PROBLEM — M17.12 PRIMARY OSTEOARTHRITIS OF LEFT KNEE: Status: RESOLVED | Noted: 2019-08-05 | Resolved: 2021-11-01

## 2021-11-01 PROBLEM — E87.1 HYPONATREMIA: Status: ACTIVE | Noted: 2021-11-01

## 2021-11-01 PROBLEM — I25.10 CAD (CORONARY ARTERY DISEASE): Status: ACTIVE | Noted: 2018-03-01

## 2021-11-01 PROBLEM — M16.11 PRIMARY OSTEOARTHRITIS OF RIGHT HIP: Status: RESOLVED | Noted: 2017-10-30 | Resolved: 2021-11-01

## 2021-11-01 LAB
ALBUMIN SERPL-MCNC: 2.4 G/DL (ref 3.5–5)
ALBUMIN/GLOB SERPL: 0.4 {RATIO} (ref 1.1–2.2)
ALP SERPL-CCNC: 168 U/L (ref 45–117)
ALT SERPL-CCNC: 49 U/L (ref 12–78)
ANION GAP SERPL CALC-SCNC: 8 MMOL/L (ref 5–15)
APPEARANCE UR: CLEAR
AST SERPL-CCNC: 39 U/L (ref 15–37)
ATRIAL RATE: 87 BPM
BACTERIA URNS QL MICRO: NEGATIVE /HPF
BASOPHILS # BLD: 0 K/UL (ref 0–0.1)
BASOPHILS NFR BLD: 0 % (ref 0–1)
BILIRUB SERPL-MCNC: 0.5 MG/DL (ref 0.2–1)
BILIRUB UR QL: NEGATIVE
BUN SERPL-MCNC: 19 MG/DL (ref 6–20)
BUN/CREAT SERPL: 15 (ref 12–20)
CALCIUM SERPL-MCNC: 8.7 MG/DL (ref 8.5–10.1)
CALCULATED P AXIS, ECG09: 50 DEGREES
CALCULATED R AXIS, ECG10: -35 DEGREES
CALCULATED T AXIS, ECG11: 52 DEGREES
CHLORIDE SERPL-SCNC: 102 MMOL/L (ref 97–108)
CK SERPL-CCNC: 67 U/L (ref 39–308)
CO2 SERPL-SCNC: 22 MMOL/L (ref 21–32)
COLOR UR: ABNORMAL
COVID-19 RAPID TEST, COVR: NOT DETECTED
CREAT SERPL-MCNC: 1.31 MG/DL (ref 0.7–1.3)
DIAGNOSIS, 93000: NORMAL
DIFFERENTIAL METHOD BLD: ABNORMAL
EOSINOPHIL # BLD: 0.1 K/UL (ref 0–0.4)
EOSINOPHIL NFR BLD: 1 % (ref 0–7)
EPITH CASTS URNS QL MICRO: ABNORMAL /LPF
ERYTHROCYTE [DISTWIDTH] IN BLOOD BY AUTOMATED COUNT: 13.2 % (ref 11.5–14.5)
EST. AVERAGE GLUCOSE BLD GHB EST-MCNC: 189 MG/DL
GLOBULIN SER CALC-MCNC: 5.5 G/DL (ref 2–4)
GLUCOSE BLD STRIP.AUTO-MCNC: 151 MG/DL (ref 65–117)
GLUCOSE BLD STRIP.AUTO-MCNC: 152 MG/DL (ref 65–117)
GLUCOSE BLD STRIP.AUTO-MCNC: 153 MG/DL (ref 65–117)
GLUCOSE BLD STRIP.AUTO-MCNC: 177 MG/DL (ref 65–117)
GLUCOSE BLD STRIP.AUTO-MCNC: 187 MG/DL (ref 65–117)
GLUCOSE BLD STRIP.AUTO-MCNC: 213 MG/DL (ref 65–117)
GLUCOSE BLD STRIP.AUTO-MCNC: 219 MG/DL (ref 65–117)
GLUCOSE SERPL-MCNC: 225 MG/DL (ref 65–100)
GLUCOSE UR STRIP.AUTO-MCNC: 250 MG/DL
HBA1C MFR BLD: 8.2 % (ref 4–5.6)
HCT VFR BLD AUTO: 40.9 % (ref 36.6–50.3)
HGB BLD-MCNC: 13.6 G/DL (ref 12.1–17)
HGB UR QL STRIP: NEGATIVE
HYALINE CASTS URNS QL MICRO: ABNORMAL /LPF (ref 0–5)
IMM GRANULOCYTES # BLD AUTO: 0 K/UL
IMM GRANULOCYTES NFR BLD AUTO: 0 %
KETONES UR QL STRIP.AUTO: ABNORMAL MG/DL
LEUKOCYTE ESTERASE UR QL STRIP.AUTO: NEGATIVE
LYMPHOCYTES # BLD: 1.1 K/UL (ref 0.8–3.5)
LYMPHOCYTES NFR BLD: 16 % (ref 12–49)
MAGNESIUM SERPL-MCNC: 1.8 MG/DL (ref 1.6–2.4)
MCH RBC QN AUTO: 32.2 PG (ref 26–34)
MCHC RBC AUTO-ENTMCNC: 33.3 G/DL (ref 30–36.5)
MCV RBC AUTO: 96.9 FL (ref 80–99)
METAMYELOCYTES NFR BLD MANUAL: 1 %
MONOCYTES # BLD: 0.6 K/UL (ref 0–1)
MONOCYTES NFR BLD: 9 % (ref 5–13)
NEUTS BAND NFR BLD MANUAL: 1 % (ref 0–6)
NEUTS SEG # BLD: 5.2 K/UL (ref 1.8–8)
NEUTS SEG NFR BLD: 72 % (ref 32–75)
NITRITE UR QL STRIP.AUTO: NEGATIVE
NRBC # BLD: 0 K/UL (ref 0–0.01)
NRBC BLD-RTO: 0 PER 100 WBC
P-R INTERVAL, ECG05: 202 MS
PH UR STRIP: 5 [PH] (ref 5–8)
PLATELET # BLD AUTO: 302 K/UL (ref 150–400)
PMV BLD AUTO: 10.4 FL (ref 8.9–12.9)
POTASSIUM SERPL-SCNC: 4.9 MMOL/L (ref 3.5–5.1)
PROT SERPL-MCNC: 7.9 G/DL (ref 6.4–8.2)
PROT UR STRIP-MCNC: 30 MG/DL
Q-T INTERVAL, ECG07: 352 MS
QRS DURATION, ECG06: 86 MS
QTC CALCULATION (BEZET), ECG08: 423 MS
RBC # BLD AUTO: 4.22 M/UL (ref 4.1–5.7)
RBC #/AREA URNS HPF: ABNORMAL /HPF (ref 0–5)
RBC MORPH BLD: ABNORMAL
SERVICE CMNT-IMP: ABNORMAL
SODIUM SERPL-SCNC: 132 MMOL/L (ref 136–145)
SODIUM UR-SCNC: 76 MMOL/L
SOURCE, COVRS: NORMAL
SP GR UR REFRACTOMETRY: 1.02 (ref 1–1.03)
UA: UC IF INDICATED,UAUC: ABNORMAL
UROBILINOGEN UR QL STRIP.AUTO: 1 EU/DL (ref 0.2–1)
VENTRICULAR RATE, ECG03: 87 BPM
WBC # BLD AUTO: 7.1 K/UL (ref 4.1–11.1)
WBC URNS QL MICRO: ABNORMAL /HPF (ref 0–4)

## 2021-11-01 PROCEDURE — 74011000250 HC RX REV CODE- 250: Performed by: PODIATRIST

## 2021-11-01 PROCEDURE — 77030011267 HC ELECTRD BLD COVD -A: Performed by: PODIATRIST

## 2021-11-01 PROCEDURE — 74011000250 HC RX REV CODE- 250: Performed by: INTERNAL MEDICINE

## 2021-11-01 PROCEDURE — 87077 CULTURE AEROBIC IDENTIFY: CPT

## 2021-11-01 PROCEDURE — 80053 COMPREHEN METABOLIC PANEL: CPT

## 2021-11-01 PROCEDURE — 0Y6M0Z4 DETACHMENT AT RIGHT FOOT, COMPLETE 1ST RAY, OPEN APPROACH: ICD-10-PCS | Performed by: PODIATRIST

## 2021-11-01 PROCEDURE — 87205 SMEAR GRAM STAIN: CPT

## 2021-11-01 PROCEDURE — 85247 CLOT FACTOR VIII MULTIMETRIC: CPT

## 2021-11-01 PROCEDURE — 93005 ELECTROCARDIOGRAM TRACING: CPT

## 2021-11-01 PROCEDURE — 83735 ASSAY OF MAGNESIUM: CPT

## 2021-11-01 PROCEDURE — 82962 GLUCOSE BLOOD TEST: CPT

## 2021-11-01 PROCEDURE — 76060000032 HC ANESTHESIA 0.5 TO 1 HR: Performed by: PODIATRIST

## 2021-11-01 PROCEDURE — 74011250637 HC RX REV CODE- 250/637: Performed by: INTERNAL MEDICINE

## 2021-11-01 PROCEDURE — 0QBQ0ZZ EXCISION OF RIGHT TOE PHALANX, OPEN APPROACH: ICD-10-PCS | Performed by: PODIATRIST

## 2021-11-01 PROCEDURE — 85246 CLOT FACTOR VIII VW ANTIGEN: CPT

## 2021-11-01 PROCEDURE — 73700 CT LOWER EXTREMITY W/O DYE: CPT

## 2021-11-01 PROCEDURE — 73718 MRI LOWER EXTREMITY W/O DYE: CPT

## 2021-11-01 PROCEDURE — 65270000029 HC RM PRIVATE

## 2021-11-01 PROCEDURE — 73630 X-RAY EXAM OF FOOT: CPT

## 2021-11-01 PROCEDURE — 77030008463 HC STPLR SKN PROX J&J -B: Performed by: PODIATRIST

## 2021-11-01 PROCEDURE — 99223 1ST HOSP IP/OBS HIGH 75: CPT | Performed by: INTERNAL MEDICINE

## 2021-11-01 PROCEDURE — 84300 ASSAY OF URINE SODIUM: CPT

## 2021-11-01 PROCEDURE — 85025 COMPLETE CBC W/AUTO DIFF WBC: CPT

## 2021-11-01 PROCEDURE — 93970 EXTREMITY STUDY: CPT

## 2021-11-01 PROCEDURE — 88305 TISSUE EXAM BY PATHOLOGIST: CPT

## 2021-11-01 PROCEDURE — 2709999900 HC NON-CHARGEABLE SUPPLY: Performed by: PODIATRIST

## 2021-11-01 PROCEDURE — 76210000021 HC REC RM PH II 0.5 TO 1 HR: Performed by: PODIATRIST

## 2021-11-01 PROCEDURE — 87186 SC STD MICRODIL/AGAR DIL: CPT

## 2021-11-01 PROCEDURE — 81001 URINALYSIS AUTO W/SCOPE: CPT

## 2021-11-01 PROCEDURE — 74011250636 HC RX REV CODE- 250/636: Performed by: INTERNAL MEDICINE

## 2021-11-01 PROCEDURE — 76010000138 HC OR TIME 0.5 TO 1 HR: Performed by: PODIATRIST

## 2021-11-01 PROCEDURE — 88311 DECALCIFY TISSUE: CPT

## 2021-11-01 PROCEDURE — 36415 COLL VENOUS BLD VENIPUNCTURE: CPT

## 2021-11-01 PROCEDURE — 77030000032 HC CUF TRNQT ZIMM -B: Performed by: PODIATRIST

## 2021-11-01 PROCEDURE — 74011250636 HC RX REV CODE- 250/636: Performed by: NURSE ANESTHETIST, CERTIFIED REGISTERED

## 2021-11-01 PROCEDURE — 0Y6P0Z2 DETACHMENT AT RIGHT 1ST TOE, MID, OPEN APPROACH: ICD-10-PCS | Performed by: PODIATRIST

## 2021-11-01 PROCEDURE — 87075 CULTR BACTERIA EXCEPT BLOOD: CPT

## 2021-11-01 PROCEDURE — 87635 SARS-COV-2 COVID-19 AMP PRB: CPT

## 2021-11-01 PROCEDURE — 74011250636 HC RX REV CODE- 250/636: Performed by: EMERGENCY MEDICINE

## 2021-11-01 RX ORDER — INSULIN GLARGINE AND LIXISENATIDE 100; 33 U/ML; UG/ML
18 INJECTION, SOLUTION SUBCUTANEOUS
COMMUNITY

## 2021-11-01 RX ORDER — PROPOFOL 10 MG/ML
INJECTION, EMULSION INTRAVENOUS AS NEEDED
Status: DISCONTINUED | OUTPATIENT
Start: 2021-11-01 | End: 2021-11-01 | Stop reason: HOSPADM

## 2021-11-01 RX ORDER — SODIUM CHLORIDE 9 MG/ML
75 INJECTION, SOLUTION INTRAVENOUS CONTINUOUS
Status: DISCONTINUED | OUTPATIENT
Start: 2021-11-01 | End: 2021-11-04

## 2021-11-01 RX ORDER — SODIUM CHLORIDE 0.9 % (FLUSH) 0.9 %
5-40 SYRINGE (ML) INJECTION EVERY 8 HOURS
Status: DISCONTINUED | OUTPATIENT
Start: 2021-11-01 | End: 2021-11-01 | Stop reason: HOSPADM

## 2021-11-01 RX ORDER — ONDANSETRON 2 MG/ML
4 INJECTION INTRAMUSCULAR; INTRAVENOUS AS NEEDED
Status: DISCONTINUED | OUTPATIENT
Start: 2021-11-01 | End: 2021-11-01 | Stop reason: HOSPADM

## 2021-11-01 RX ORDER — SODIUM CHLORIDE 0.9 % (FLUSH) 0.9 %
5-40 SYRINGE (ML) INJECTION AS NEEDED
Status: DISCONTINUED | OUTPATIENT
Start: 2021-11-01 | End: 2021-11-01 | Stop reason: HOSPADM

## 2021-11-01 RX ORDER — FLUMAZENIL 0.1 MG/ML
0.2 INJECTION INTRAVENOUS
Status: DISCONTINUED | OUTPATIENT
Start: 2021-11-01 | End: 2021-11-01 | Stop reason: HOSPADM

## 2021-11-01 RX ORDER — ALBUTEROL SULFATE 0.83 MG/ML
2.5 SOLUTION RESPIRATORY (INHALATION) AS NEEDED
Status: DISCONTINUED | OUTPATIENT
Start: 2021-11-01 | End: 2021-11-01 | Stop reason: HOSPADM

## 2021-11-01 RX ORDER — FENTANYL CITRATE 50 UG/ML
25 INJECTION, SOLUTION INTRAMUSCULAR; INTRAVENOUS
Status: DISCONTINUED | OUTPATIENT
Start: 2021-11-01 | End: 2021-11-01 | Stop reason: HOSPADM

## 2021-11-01 RX ORDER — SODIUM CHLORIDE, SODIUM LACTATE, POTASSIUM CHLORIDE, CALCIUM CHLORIDE 600; 310; 30; 20 MG/100ML; MG/100ML; MG/100ML; MG/100ML
INJECTION, SOLUTION INTRAVENOUS
Status: DISCONTINUED | OUTPATIENT
Start: 2021-11-01 | End: 2021-11-01 | Stop reason: HOSPADM

## 2021-11-01 RX ORDER — NALOXONE HYDROCHLORIDE 0.4 MG/ML
0.04 INJECTION, SOLUTION INTRAMUSCULAR; INTRAVENOUS; SUBCUTANEOUS
Status: DISCONTINUED | OUTPATIENT
Start: 2021-11-01 | End: 2021-11-01 | Stop reason: HOSPADM

## 2021-11-01 RX ORDER — BUPIVACAINE HYDROCHLORIDE 5 MG/ML
INJECTION, SOLUTION EPIDURAL; INTRACAUDAL AS NEEDED
Status: DISCONTINUED | OUTPATIENT
Start: 2021-11-01 | End: 2021-11-01 | Stop reason: HOSPADM

## 2021-11-01 RX ORDER — SODIUM CHLORIDE, SODIUM LACTATE, POTASSIUM CHLORIDE, CALCIUM CHLORIDE 600; 310; 30; 20 MG/100ML; MG/100ML; MG/100ML; MG/100ML
125 INJECTION, SOLUTION INTRAVENOUS CONTINUOUS
Status: DISCONTINUED | OUTPATIENT
Start: 2021-11-01 | End: 2021-11-01 | Stop reason: HOSPADM

## 2021-11-01 RX ORDER — LIDOCAINE HYDROCHLORIDE 10 MG/ML
0.1 INJECTION, SOLUTION EPIDURAL; INFILTRATION; INTRACAUDAL; PERINEURAL AS NEEDED
Status: DISCONTINUED | OUTPATIENT
Start: 2021-11-01 | End: 2021-11-01 | Stop reason: HOSPADM

## 2021-11-01 RX ORDER — HYDROMORPHONE HYDROCHLORIDE 1 MG/ML
.25-1 INJECTION, SOLUTION INTRAMUSCULAR; INTRAVENOUS; SUBCUTANEOUS
Status: DISCONTINUED | OUTPATIENT
Start: 2021-11-01 | End: 2021-11-01 | Stop reason: HOSPADM

## 2021-11-01 RX ORDER — FENTANYL CITRATE 50 UG/ML
INJECTION, SOLUTION INTRAMUSCULAR; INTRAVENOUS AS NEEDED
Status: DISCONTINUED | OUTPATIENT
Start: 2021-11-01 | End: 2021-11-01 | Stop reason: HOSPADM

## 2021-11-01 RX ORDER — PROPOFOL 10 MG/ML
INJECTION, EMULSION INTRAVENOUS
Status: DISCONTINUED | OUTPATIENT
Start: 2021-11-01 | End: 2021-11-01 | Stop reason: HOSPADM

## 2021-11-01 RX ORDER — DIPHENHYDRAMINE HYDROCHLORIDE 50 MG/ML
12.5 INJECTION, SOLUTION INTRAMUSCULAR; INTRAVENOUS AS NEEDED
Status: DISCONTINUED | OUTPATIENT
Start: 2021-11-01 | End: 2021-11-01 | Stop reason: HOSPADM

## 2021-11-01 RX ORDER — CLINDAMYCIN PHOSPHATE 600 MG/50ML
600 INJECTION, SOLUTION INTRAVENOUS EVERY 8 HOURS
Status: DISCONTINUED | OUTPATIENT
Start: 2021-11-01 | End: 2021-11-02

## 2021-11-01 RX ORDER — CLINDAMYCIN PHOSPHATE 150 MG/ML
300 INJECTION, SOLUTION INTRAVENOUS EVERY 8 HOURS
Status: DISCONTINUED | OUTPATIENT
Start: 2021-11-01 | End: 2021-11-01 | Stop reason: DRUGHIGH

## 2021-11-01 RX ORDER — SULFAMETHOXAZOLE AND TRIMETHOPRIM 800; 160 MG/1; MG/1
1 TABLET ORAL 2 TIMES DAILY
COMMUNITY
End: 2021-11-09

## 2021-11-01 RX ORDER — HEPARIN SODIUM 5000 [USP'U]/ML
5000 INJECTION, SOLUTION INTRAVENOUS; SUBCUTANEOUS EVERY 8 HOURS
Status: CANCELLED | OUTPATIENT
Start: 2021-11-02

## 2021-11-01 RX ADMIN — Medication 10 ML: at 22:39

## 2021-11-01 RX ADMIN — MEROPENEM 500 MG: 500 INJECTION, POWDER, FOR SOLUTION INTRAVENOUS at 06:28

## 2021-11-01 RX ADMIN — MEROPENEM 500 MG: 500 INJECTION, POWDER, FOR SOLUTION INTRAVENOUS at 14:37

## 2021-11-01 RX ADMIN — SODIUM CHLORIDE 75 ML/HR: 9 INJECTION, SOLUTION INTRAVENOUS at 22:39

## 2021-11-01 RX ADMIN — FENTANYL CITRATE 25 MCG: 50 INJECTION, SOLUTION INTRAMUSCULAR; INTRAVENOUS at 20:06

## 2021-11-01 RX ADMIN — VANCOMYCIN HYDROCHLORIDE 1250 MG: 1.25 INJECTION, POWDER, LYOPHILIZED, FOR SOLUTION INTRAVENOUS at 12:01

## 2021-11-01 RX ADMIN — MEROPENEM 500 MG: 500 INJECTION, POWDER, FOR SOLUTION INTRAVENOUS at 00:23

## 2021-11-01 RX ADMIN — FENTANYL CITRATE 25 MCG: 50 INJECTION, SOLUTION INTRAMUSCULAR; INTRAVENOUS at 19:57

## 2021-11-01 RX ADMIN — PROPOFOL 50 MG: 10 INJECTION, EMULSION INTRAVENOUS at 20:03

## 2021-11-01 RX ADMIN — VANCOMYCIN HYDROCHLORIDE 2000 MG: 10 INJECTION, POWDER, LYOPHILIZED, FOR SOLUTION INTRAVENOUS at 00:24

## 2021-11-01 RX ADMIN — CLINDAMYCIN PHOSPHATE 600 MG: 600 INJECTION, SOLUTION INTRAVENOUS at 12:01

## 2021-11-01 RX ADMIN — SODIUM CHLORIDE, POTASSIUM CHLORIDE, SODIUM LACTATE AND CALCIUM CHLORIDE: 600; 310; 30; 20 INJECTION, SOLUTION INTRAVENOUS at 19:57

## 2021-11-01 RX ADMIN — CLINDAMYCIN PHOSPHATE 600 MG: 600 INJECTION, SOLUTION INTRAVENOUS at 02:15

## 2021-11-01 RX ADMIN — CLINDAMYCIN PHOSPHATE 600 MG: 600 INJECTION, SOLUTION INTRAVENOUS at 20:08

## 2021-11-01 RX ADMIN — PROPOFOL 65 MCG/KG/MIN: 10 INJECTION, EMULSION INTRAVENOUS at 20:03

## 2021-11-01 RX ADMIN — FENTANYL CITRATE 50 MCG: 50 INJECTION, SOLUTION INTRAMUSCULAR; INTRAVENOUS at 20:24

## 2021-11-01 RX ADMIN — SODIUM CHLORIDE 1000 ML: 9 INJECTION, SOLUTION INTRAVENOUS at 00:24

## 2021-11-01 RX ADMIN — Medication 10 ML: at 06:28

## 2021-11-01 RX ADMIN — ACETAMINOPHEN 650 MG: 325 TABLET ORAL at 01:26

## 2021-11-01 RX ADMIN — SODIUM CHLORIDE 75 ML/HR: 9 INJECTION, SOLUTION INTRAVENOUS at 01:43

## 2021-11-01 NOTE — PROGRESS NOTES
Verbal shift change report given to Cristal Lacy (oncoming nurse) by Phil Boateng (offgoing nurse). Report included the following information SBAR, Kardex, Intake/Output and MAR.

## 2021-11-01 NOTE — ANESTHESIA PREPROCEDURE EVALUATION
Relevant Problems   NEUROLOGY   (+) Anxiety and depression      CARDIOVASCULAR   (+) CAD (coronary artery disease)      RENAL FAILURE   (+) CKD (chronic kidney disease), stage III (HCC)      ENDOCRINE   (+) Arthritis   (+) DM type 2 causing renal disease (HCC)   (+) DM type 2 causing vascular disease (HCC)      PERSONAL HX & FAMILY HX OF CANCER   (+) Prostate cancer (HCC)       Anesthetic History   No history of anesthetic complications            Review of Systems / Medical History  Patient summary reviewed, nursing notes reviewed and pertinent labs reviewed    Pulmonary  Within defined limits                 Neuro/Psych         Psychiatric history    Comments: Anxiety/depression  Peripheral neuropathy: moderate to severe Cardiovascular              Past MI, CAD and CABG    Exercise tolerance: >4 METS  Comments: MI, CABG 3/2018   GI/Hepatic/Renal         Renal disease: CRI       Endo/Other    Diabetes: poorly controlled, type 2    Obesity, arthritis and cancer    Comments: Prostate cancer Other Findings   Comments: Von Willebrand disease         Physical Exam    Airway             Cardiovascular    Rhythm: regular  Rate: normal         Dental    Dentition: Lower dentition intact and Upper dentition intact     Pulmonary  Breath sounds clear to auscultation               Abdominal         Other Findings            Anesthetic Plan    ASA: 3  Anesthesia type: MAC            Anesthetic plan and risks discussed with: Patient      Informed consent obtained.

## 2021-11-01 NOTE — ED TRIAGE NOTES
Pt  to triage with mask, c/o toe pain on the R great toe. [facility] has been taking abx from [facility]. [facility] drainage, foul smelling.

## 2021-11-01 NOTE — PROGRESS NOTES
Assisted patient to the side of the bed to use the urinal.  Voided 150 mls of urine. Patient stated that he \"wasn't feeling well, thinks his blood sugar is low since he hasn't eaten since yesterday. \"  FBS taken 151 results.

## 2021-11-01 NOTE — H&P
[address] [facility] [place] [address]  [address]  [phone]    Admission History and Physical      NAME:  Mabelene Litten   :   1950   MRN:  [id number]     PCP:  Steffanie Roberts MD     Date/Time of service:  10/31/2021  11:33 PM        Subjective:     CHIEF COMPLAINT: Foot wound    HISTORY OF PRESENT ILLNESS:     Mr. Declan Quick is a 66 y.o.  male with a past medical history of diabetes, coronary artery disease, prostate cancer, von Willebrand disease who is admitted with right diabetic foot wound. Mr. Declan Quick states that about 2 weeks ago he developed an ulcer on the bottom of his right foot. He then began to experience worsening erythema, swelling and drainage. He saw his primary care doctor who he states prescribed him Bactrim, and advised him to come to the emergency room if his foot does not improve. He endorses associated fevers. No associated chest pain or shortness of breath. He does endorse multiple surgeries on his right foot due to hammertoes; and, he states he has had issues with his foot since then. He states he does not follow with podiatrist.    Allergies   Allergen Reactions    Ddavp [Desmopressin] Other (comments)     KIDNEY ISSUES. Prior to Admission medications    Medication Sig Start Date End Date Taking? Authorizing Provider   acetaminophen (TYLENOL) 500 mg tablet Take 2 Tabs by mouth every six (6) hours. 19   Leanora Favorite, PA-C   meloxicam (MOBIC) 7.5 mg tablet Take 1 Tab by mouth daily. 19   Leanora Favorite, PA-C   senna-docusate (PERICOLACE) 8.6-50 mg per tablet Take 1 Tab by mouth two (2) times a day. 19   Leanora Favorite, PA-C   pioglitazone (ACTOS) 15 mg tablet Take 15 mg by mouth daily. Provider, Historical   hydroCHLOROthiazide (HYDRODIURIL) 12.5 mg tablet Take 12.5 mg by mouth daily as needed. Provider, Historical   potassium 99 mg tablet Take 99 mg by mouth daily.  PT TAKES 2 TABS DAILY FOR LEG CRAMPS Provider, Historical   clopidogrel (PLAVIX) 75 mg tab Take 75 mg by mouth daily. Provider, Historical   aspirin delayed-release 81 mg tablet Take 81 mg by mouth daily. Provider, Historical   multivitamin (ONE A DAY) tablet Take 1 Tab by mouth daily. Provider, Historical   OTHER Take 1 Tab by mouth daily. CATECOSTIM (HERBAL SUPPLEMENT)    Provider, Historical   insulin degludec (TRESIBA U-100 INSULIN SC) 20 Units by SubCUTAneous route every evening. Provider, Historical   TESTOSTERONE IM 1 Dose by IntraMUSCular route every twenty-eight (28) days. Provider, Historical   diazePAM (VALIUM) 5 mg tablet Take 5 mg by mouth as needed for Anxiety. Provider, Historical   simvastatin (ZOCOR) 40 mg tablet Take  by mouth nightly. Provider, Historical   metFORMIN (GLUCOPHAGE) 500 mg tablet Take 500 mg by mouth two (2) times daily (with meals). Other, MD Daryl   irbesartan (AVAPRO) 150 mg tablet Take 150 mg by mouth daily. IN AM    Other, MD Daryl       Past Medical History:   Diagnosis Date    Arthritis     CAD (coronary artery disease) 03/2018    CABG, [place]    Cancer ([facility] 2012    Prostate    Chronic pain     LEFT KNEE    Diabetes ([facility] [address] [facility]     NIDDM    Ill-defined condition     ISSUES WITH CLOTTING FACTOR VIII    Psychiatric disorder     DEPRESSION    Von Willebrand disease ([facility] [address] [facility]         Past Surgical History:   Procedure Laterality Date    ABDOMEN SURGERY PROC UNLISTED  1954    RIGHT CONGENITAL INGUINAL HERNIA REPAIR    ABDOMEN SURGERY [address]    LEFT INGUINAL HERNIA REPAIR    ABDOMEN SURGERY [address] UNLISTED      SIGMOIDOSCOPY-FOR HEMMORIODS.     CABG, ARTERY-VEIN, FOUR  03/2018    CABG    HX APPENDECTOMY  1979    HX ARTERIAL BYPASS      HX CATARACT REMOVAL Bilateral 2016, 2011    HX GI  2017    COLONOSCOPY WITH NO POLYPS    HX HEENT  1949    T&A    HX HEENT  1972    EXTRACTION OF WISDOM TEETH X 4 (2 SURGERIES 2 TEETH AT A TIME)    HX ORTHOPAEDIC  02/2006    RIGHT HAMMER TOES X 4 FIXED    HX OTHER SURGICAL  1963    RIGHT INNER THIGH CEBACIOUS CYST REMOVED    HX PROSTATECTOMY  06/2012    RADICAL PROSTATECTOMY    TOTAL HIP ARTHROPLASTY Right 2017       Social History     Tobacco Use    Smoking status: Never Smoker    Smokeless tobacco: Never Used    Tobacco comment: h/o PIPE SMOKING IN 1970's, STOPPED   Substance Use Topics    Alcohol use: Yes     Comment: RARE        Family History   Problem Relation Age of Onset    Cancer Mother         ovarian?     Cancer Father         PROSTATE CA, & NON MALIGNANT TUMOR SURGERY THAT HE DID NOT RECOVER FROM & HAD RENAL SURGERY    Other Father         BRAIN TUMOR    Kidney Disease Father         RENAL FAILURE    No Known Problems Sister     No Known Problems Brother     No Known Problems Daughter     No Known Problems Son     Anesth Problems Neg Hx         Review of Systems:  (bold if positive, if negative)    Gen:  feverEyes:  ENT:  CVS:  Pulm:  GI:  :  MS:  Skin:  Psych:  Endo:  Hem:  Renal:  Neuro:            Objective:      VITALS:    Vital signs reviewed; most recent are:    Visit Vitals  /77 (BP 1 Location: Right upper arm, BP Patient Position: At rest)   Pulse 90   Temp 97.3 °F (36.3 °C)   Resp 18   Ht 5' 11\" (1.803 m)   Wt 104.3 kg (230 lb)   SpO2 96%   BMI 32.08 kg/m²     SpO2 Readings from Last 6 Encounters:   10/31/21 96%   08/09/19 98%   07/18/19 100%   05/24/19 97%   01/08/19 98%   10/31/17 95%        No intake or output data in the 24 hours ending 10/31/21 4801     Exam:     Physical Exam:    Gen:  Well-developed, well-nourished, in no acute distress  HEENT:  Pink conjunctivae, PERRL, hearing intact to voice  Resp:  No accessory muscle use, clear breath sounds without wheezes rales or rhonchi  Card:  RRR, No murmurs, normal S1, S2, no peripheral edema  Abd:  Soft, non-tender, non-distended, normoactive bowel sounds are present  Musc: Right lower extremity swelling  Skin: Erythema and purulent discharge right foot, right foot ulcer  Neuro:  Cranial nerves 3-12 are grossly intact,  follows commands appropriately  Psych:  Oriented to person, place, and time, Alert with good insight      Labs:    Recent Labs     10/31/21  2143   WBC 11.7*   HGB 11.1*   HCT 33.0*        Recent Labs     10/31/21  2143   *   K 5.1      CO2 24   *   BUN 23*   CREA 1.52*   CA 8.5   ALB 2.5*   TBILI 0.5   ALT 51     Lab Results   Component Value Date/Time    Glucose (POC) 143 (H) 08/09/2019 04:15 PM    Glucose (POC) 189 (H) 08/09/2019 11:32 AM     No results for input(s): PH, PCO2, PO2, HCO3, FIO2 in the last 72 hours. No results for input(s): INR, INREXT, INREXT in the last 72 hours. Radiology and EKG reviewed: Concern for gas-forming cellulitis. Old Records reviewed in [address] [place] [address]       Assessment/Plan:         Diabetic foot ulcer ([facility] [address] (10/31/2021)/right foot cellulitis: Chest x-ray with concern for gas-forming cellulitis; stat consult to orthopedics to discuss possible need for surgery. Blood cultures and wound culture. Broad-spectrum antibiotics with IV vancomycin,  Meropenem and Clindamycin. Tylenol and IV dilaudid PRN. Consult orthopedics. Consult to podiatry. Consult wound care. *addendum: spoke with ortho on call. They have ordered ct of right lower extremity and will evaluate patient. Acute kidney injury: suspect due to volume depletion. Check UA and urine sodium. IVF. Avoid nephrotoxins. Monitor     Hyponatremia: due to pseudohyponatremia due to hyperglycemia and IVVD. Monitor     Diabetes: Obtain A1c. Hold oral home meds. Insulin sliding scale. Hx of von willebrand disease: monitor for bleeding. Coronary artery disease: Obtain home med rec. History of prostate cancer: Follow up OP.      Risk of deterioration: high      Total time spent with patient: 48 Minutes **I personally saw and examined the patient during this time period**                 Care Plan discussed with: Patient and Nursing Staff    Discussed:  Code Status and Care Plan    Prophylaxis:  Hep SQ ( hold for possible OR)     Probable Disposition:  HH PT, OT, RN           ___________________________________________________    Attending Physician: Barry Snow DO

## 2021-11-01 NOTE — PROGRESS NOTES
Bilateral LE venous duplex attempted. Right lower extremity completed when patient declined to scan the left extremity. Final results to follow.

## 2021-11-01 NOTE — CONSULTS
[address] [facility] at [address] [place] [address]  [phone]    Hematology / Oncology Consult    Reason for Visit:   Maame Lopez is a 66 y.o. male who is seen in consultation at the request of Dr. Caron Zapata for evaluation of \"vWD, do we need pre-surgical treatment. \"    History of Present Illness:   Maame Lopez is a 66 y.o. male with h/o von Willebrand Disease, DM, CAD, h/o prostate cancer admitted with right foot diabetic ulcer. I am asked to make pre-op recommendations prior to upcoming Podiatry surgery today. History of based on review of outside records and patient provided history. He states he was found to have borderline VWD levels back in 2012 after a prostatectomy, resulting in drop in Hgb from 14 to 8 range. He was treated with DDAVP at that time, and he suffered with acute kidney problems, requiring ICU stay. He did make a full recovery. Afterwards, he went on to have several successful surgeries including R hip surgery in 2017, 4v CABG in 3/2018 and L knee surgery in 7/2019. Patient did not received DDAVP with any of these surgeries. He did not require any blood transfusions and did not have bleeding complications with these surgeries. Prior to the R hip surgery in 2017, he met with hematologist, Dr. Blue Ortiz at [facility]. Von Willebrand Disease levels were normal at that time and are listed in lab section below. Pt denies any current bleeding or bruising problems. No melena/hematochezia, hematuria, gum bleeding, epistaxis.     Past Medical History:   Diagnosis Date    Anxiety and depression     Arthritis     CAD (coronary artery disease) 03/2018    CABG, [place]    Chronic pain     LEFT KNEE    CKD (chronic kidney disease), stage III ([address]     DM type 2 causing renal disease ([address]     DM type 2 causing vascular disease ([address]     Prostate cancer ([address]     Von Willebrand disease ([address]       Past Surgical History:   Procedure Laterality Date    HX APPENDECTOMY  1979    HX ARTERIAL BYPASS      HX CATARACT REMOVAL Bilateral 2016, 2011    HX GI  2017    COLONOSCOPY WITH NO POLYPS    HX HEENT  1949    T&A    HX HEENT  1972    EXTRACTION OF WISDOM TEETH X 4 (2 SURGERIES 2 TEETH AT A TIME)    HX ORTHOPAEDIC  02/2006    RIGHT HAMMER TOES X 4 FIXED    HX OTHER SURGICAL  1963    RIGHT INNER THIGH CEBACIOUS CYST REMOVED    HX PROSTATECTOMY  06/2012    RADICAL PROSTATECTOMY    [place] ABDOMEN SURGERY PROC UNLISTED  1954    RIGHT CONGENITAL INGUINAL HERNIA REPAIR    [place] ABDOMEN SURGERY PROC UNLISTED  1991    LEFT INGUINAL HERNIA REPAIR    [place] ABDOMEN SURGERY [address] UNLISTED      SIGMOIDOSCOPY-FOR HEMMORIODS.  [place] CABG, ARTERY-VEIN, FOUR  03/2018    CABG    [place] TOTAL HIP ARTHROPLASTY Right 2017      Social History     Tobacco Use    Smoking status: Never Smoker    Smokeless tobacco: Never Used    Tobacco comment: h/o PIPE SMOKING IN 1970's, STOPPED   Substance Use Topics    Alcohol use: Yes     Comment: RARE      Family History   Problem Relation Age of Onset    Cancer Mother         ovarian?     Cancer Father         PROSTATE CA, & NON MALIGNANT TUMOR SURGERY THAT HE DID NOT RECOVER FROM & HAD RENAL SURGERY    Other Father         BRAIN TUMOR    Kidney Disease Father         RENAL FAILURE    No Known Problems Sister     No Known Problems Brother     No Known Problems Daughter     No Known Problems Son     Anesth Problems Neg Hx      Current Facility-Administered Medications   Medication Dose Route Frequency    0.9% sodium chloride infusion  75 mL/hr IntraVENous CONTINUOUS    clindamycin (CLEOCIN) 600mg NS 50 mL IVPB (premix)  600 mg IntraVENous Q8H    sodium chloride (NS) flush 5-40 mL  5-40 mL IntraVENous Q8H    sodium chloride (NS) flush 5-40 mL  5-40 mL IntraVENous PRN    acetaminophen (TYLENOL) tablet 650 mg  650 mg Oral Q6H PRN    Or    acetaminophen (TYLENOL) suppository 650 mg  650 mg Rectal Q6H PRN    polyethylene glycol (MIRALAX) packet 17 g  17 g Oral DAILY PRN  ondansetron (ZOFRAN ODT) tablet 4 mg  4 mg Oral Q8H PRN    Or    ondansetron (ZOFRAN) injection 4 mg  4 mg IntraVENous Q6H PRN    [Held by provider] heparin (porcine) injection 5,000 Units  5,000 Units SubCUTAneous Q8H    HYDROmorphone (DILAUDID) injection 1 mg  1 mg IntraVENous Q6H PRN    insulin lispro (HUMALOG) injection   SubCUTAneous AC&HS    glucose chewable tablet 16 g  4 Tablet Oral PRN    dextrose (D50W) injection syrg 12.5-25 g  12.5-25 g IntraVENous PRN    glucagon (GLUCAGEN) injection 1 mg  1 mg IntraMUSCular PRN    vancomycin (VANCOCIN) 1,250 mg in 0.9% sodium chloride 250 mL (VIAL-MATE)  1,250 mg IntraVENous Q24H    meropenem (MERREM) 500 mg in sterile water (preservative free) 10 mL IV syringe  0.5 g IntraVENous Q6H      Allergies   Allergen Reactions    Ddavp [Desmopressin] Other (comments)     KIDNEY ISSUES. Review of Systems: A complete review of systems was obtained, negative except as described above. Physical Exam:     Visit Vitals  BP (!) 148/79 (BP 1 Location: Left upper arm, BP Patient Position: At rest)   Pulse 84   Temp 99.4 °F (37.4 °C)   Resp 18   Ht 5' 11\" (1.803 m)   Wt 230 lb (104.3 kg)   SpO2 97%   BMI 32.08 kg/m²     ECOG PS: 0  General: No distress  Eyes: PERRLA, EOMI, anicteric sclerae  HENT: Atraumatic with normal appearance of ears and nose; OP clear  Neck: Supple; no thyromegaly or JVD  Lymphatic: No cervical, supraclavicular, or axillary adenopathy  Respiratory: CTAB, normal respiratory effort  CV: Normal rate, regular rhythm, no murmurs, RLE with trace edema and mildly darker than LLE. GI: Soft, nontender, nondistended, no masses, no hepatomegaly, no splenomegaly  MS: Normal muscle tone. Digits without clubbing or cyanosis. R foot with bandage in place  Skin: No rashes, ecchymoses, or petechiae. Normal temperature, turgor, and texture.   Neuro/Psych: Alert, oriented, appropriate affect, normal judgment/insight      Results:     Lab Results Component Value Date/Time    WBC 7.1 11/01/2021 01:48 AM    HGB 13.6 11/01/2021 01:48 AM    HCT 40.9 11/01/2021 01:48 AM    PLATELET 912 33/18/6015 01:48 AM    MCV 96.9 11/01/2021 01:48 AM    ABS. NEUTROPHILS 5.2 11/01/2021 01:48 AM    Hematocrit (POC) 46 05/24/2019 12:45 AM     Lab Results   Component Value Date/Time    Sodium 132 (L) 11/01/2021 01:48 AM    Potassium 4.9 11/01/2021 01:48 AM    Chloride 102 11/01/2021 01:48 AM    CO2 22 11/01/2021 01:48 AM    Glucose 225 (H) 11/01/2021 01:48 AM    BUN 19 11/01/2021 01:48 AM    Creatinine 1.31 (H) 11/01/2021 01:48 AM    GFR est AA >60 11/01/2021 01:48 AM    GFR est non-AA 54 (L) 11/01/2021 01:48 AM    Calcium 8.7 11/01/2021 01:48 AM    Sodium (POC) 138 05/24/2019 12:45 AM    Potassium (POC) 4.2 05/24/2019 12:45 AM    Chloride (POC) 100 05/24/2019 12:45 AM    Glucose (POC) 177 (H) 11/01/2021 11:45 AM    BUN (POC) 25 (H) 05/24/2019 12:45 AM    Creatinine (POC) 1.0 05/24/2019 12:45 AM    Calcium, ionized (POC) 1.11 (L) 05/24/2019 12:45 AM     Lab Results   Component Value Date/Time    Bilirubin, total 0.5 11/01/2021 01:48 AM    ALT (SGPT) 49 11/01/2021 01:48 AM    Alk. phosphatase 168 (H) 11/01/2021 01:48 AM    Protein, total 7.9 11/01/2021 01:48 AM    Albumin 2.4 (L) 11/01/2021 01:48 AM    Globulin 5.5 (H) 11/01/2021 01:48 AM     10/24/2017:  PTT 26.4  Factor VIII Activity 115% []  vWF Activity 110% []  vWF Antigen 128% []  vWF collagen binding Act 146% []  vWF Collagen Binding 1.1 [0.6-5.0]  Factor VIII Activity 163% []  vWF Activity 120% []    Coag Studies Interpretation Report:  The vWF:Ag is normal. The vWF:RCo is normal. The FVIII is normal.   Interpretation:  These results are not consistent with a diagnosis of VWD according to the current NHLBI guideline.      Imaging:     CT R foot:  Diffuse edema, more prominent at the great toe with areas of soft tissue gas  about the first toe extending to the level of the mid metatarsal underlying  large plantar ulceration on the plantar aspect and along the extensor tendon  tract dorsally. No CT evidence of osteomyelitis. No clearly defined organized  abscess collection.     Preliminary report was provided by Dr. Renetta Israel, the on-call radiologist, at  04:22     Final report to follow. Right foot MRI:  IMPRESSION  1. Osteomyelitis of the tibial hallux sesamoid. Patient is at risk for  osteomyelitis of the first metatarsal and first proximal phalanx. 2. Gas forming cellulitis in the great toe. Plantar soft tissue ulceration has  sinus tract from the ulcer to the tibial hallux sesamoid. 3. Complete rupture of the FHL tendon, which may be secondarily infected. 4. Chronic dislocation of the second MTP joint. Chronic nonspecific arthritis in  the second MTP joint. Postsurgical second and third proximal phalanges. Assessment and Recommendations:   Al[place]ia Nur is a 66 y.o. male with DM, CAD admitted with diabetic foot ulcer. 1. Pre-op Hematology recommendations given reported history of von Willebrand Disease:  I have reviewed patient's prior Hematology labs and records from 2017 as well as pt provided history. No evidence of von Willebrand disease based on labs in 2017. Patient may have had mild Type I VWD in the past and levels can naturally increase and normalize with age. On the other hand, he might have ever had VWD as this diagnosis can be challenging to make. I recommend rechecking now, but do not need to wait for results prior to proceeding with surgery. Patient has had prior adverse problems with DDAVP and has actually not required it for several surgeries including CABG, R hip surgery and left knee surgery. -- Checking von Willebrand Disease panel and multimers - do not need to await results  -- No pre-op DDAVP recommended. In fact, this is listed as an allergy in patient's chart    2. Diabetic foot ulcer / Osteomyelitis:  CT and MRI done.  Pt undergoing debridement by Podiatry - after review of MRI, pt may need resection of bone as well. 3. Type II DM: On home Actos and Metformin which are currently on hold. Is being treated with NPH and SSI in hospital.     4. CAD s/p CABG:   Stable. ASA, Plavix placed on hold pre-op. Antihypertensives on hold as well. 5. CKD:  Likely 2/2 DM and HTN. Receiving IVF.     Signed By: Pilar Luna MD     November 1, 2021

## 2021-11-01 NOTE — PROGRESS NOTES
[facility]    Medical Progress Note      NAME: Andrew Rizo   :  1950  MRM:  [id number]    Date/Time of service 2021  9:59 AM          Assessment and Plan:     Diabetic foot ulcer - POA. Not septic. Low procalcitonin. Gas on imaging, but no abscess or osteo based on CT. Podiatry consulted. Currently on vanco, merro and clinda. Monitor cx. DM type 2 causing renal, vascular and neurological disease - Diabetic diet and counseling. SSI per protocol. Usually on home actos, metformin but will hold for now. Give NPH for now. Check A1c.    CAD (coronary artery disease) / Hypertension - BP  Low end. No symptoms, appears stable. Hold HCTZ and irbesartan unless BP rises. Hold ASA and plavix for surgery. Resume simvastatin when eating    Von Willebrand disease - Monitor for bleeding during any procedure. Consult hematology if surgery planned, to suggest any pre op factor modification    CKD (chronic kidney disease), stage III / Hyponatremia - POA, hold NSAIDS    Chronic pain / Arthritis - tylenol prn    Anxiety and depression - Usually on prn valium, would benefit instead from counseling and SSRI    Hx Prostate cancer - Outpatient follow up       Subjective:     Chief Complaint:  Awaiting podiatry, no new complaints    ROS:  (bold if positive, if negative)    Tolerating PT  Tolerating Diet        Objective:     Last 24hrs VS reviewed since prior progress note.  Most recent are:    Visit Vitals  /68 (BP 1 Location: Left arm, BP Patient Position: At rest)   Pulse 86   Temp 99.5 °F (37.5 °C)   Resp 18   Ht 5' 11\" (1.803 m)   Wt 104.3 kg (230 lb)   SpO2 94%   BMI 32.08 kg/m²     SpO2 Readings from Last 6 Encounters:   21 94%   19 98%   19 100%   19 97%   19 98%   10/31/17 95%        No intake or output data in the 24 hours ending 21 6598     Physical Exam:    Gen: Obese, in no acute distress  HEENT:  Pink conjunctivae, PERRL, hearing intact to voice, moist mucous membranes  Neck:  Supple, without masses, thyroid non-tender  Resp:  No accessory muscle use, clear breath sounds without wheezes rales or rhonchi  Card:  No murmurs, normal S1, S2 without thrills, bruits or peripheral edema  Abd:  Soft, non-tender, non-distended, normoactive bowel sounds are present, no mass  Lymph:  No cervical or inguinal adenopathy  Musc:  No cyanosis or clubbing  Skin:  Anterior R foot erythema and edema, with ulcers, skin turgor is good  Neuro:  Cranial nerves are grossly intact, no focal motor weakness, follows commands appropriately  Psych:  Good insight, oriented to person, place and time, alert    Telemetry reviewed:   normal sinus rhythm  __________________________________________________________________  Medications Reviewed: (see below)  Medications:     Current Facility-Administered Medications   Medication Dose Route Frequency    0.9% sodium chloride infusion  75 mL/hr IntraVENous CONTINUOUS    clindamycin (CLEOCIN) 600mg NS 50 mL IVPB (premix)  600 mg IntraVENous Q8H    sodium chloride (NS) flush 5-40 mL  5-40 mL IntraVENous Q8H    sodium chloride (NS) flush 5-40 mL  5-40 mL IntraVENous PRN    acetaminophen (TYLENOL) tablet 650 mg  650 mg Oral Q6H PRN    Or    acetaminophen (TYLENOL) suppository 650 mg  650 mg Rectal Q6H PRN    polyethylene glycol (MIRALAX) packet 17 g  17 g Oral DAILY PRN    ondansetron (ZOFRAN ODT) tablet 4 mg  4 mg Oral Q8H PRN    Or    ondansetron (ZOFRAN) injection 4 mg  4 mg IntraVENous Q6H PRN    [Held by provider] heparin (porcine) injection 5,000 Units  5,000 Units SubCUTAneous Q8H    HYDROmorphone (DILAUDID) injection 1 mg  1 mg IntraVENous Q6H PRN    insulin lispro (HUMALOG) injection   SubCUTAneous AC&HS    glucose chewable tablet 16 g  4 Tablet Oral PRN    dextrose (D50W) injection syrg 12.5-25 g  12.5-25 g IntraVENous PRN    glucagon (GLUCAGEN) injection 1 mg  1 mg IntraMUSCular PRN    vancomycin (VANCOCIN) 1,250 mg in 0.9% sodium chloride 250 mL (VIAL-MATE)  1,250 mg IntraVENous Q24H    meropenem (MERREM) 500 mg in sterile water (preservative free) 10 mL IV syringe  0.5 g IntraVENous Q6H        Lab Data Reviewed: (see below)  Lab Review:     Recent Labs     11/01/21  0148 10/31/21  2143   WBC 7.1 11.7*   HGB 13.6 11.1*   HCT 40.9 33.0*    250     Recent Labs     11/01/21  0148 10/31/21  2143   * 131*   K 4.9 5.1    100   CO2 22 24   * 312*   BUN 19 23*   CREA 1.31* 1.52*   CA 8.7 8.5   MG 1.8  --    ALB 2.4* 2.5*   TBILI 0.5 0.5   ALT 49 51     Lab Results   Component Value Date/Time    Glucose (POC) 213 (H) 11/01/2021 06:54 AM    Glucose (POC) 219 (H) 11/01/2021 01:57 AM    Glucose (POC) 143 (H) 08/09/2019 04:15 PM    Glucose (POC) 189 (H) 08/09/2019 11:32 AM    Glucose (POC) 121 (H) 08/09/2019 06:48 AM     No results for input(s): PH, PCO2, PO2, HCO3, FIO2 in the last 72 hours. No results for input(s): INR, INREXT in the last 72 hours. All Micro Results     Procedure Component Value Units Date/Time    CULTURE, BLOOD [[id number]] Collected: 10/31/21 2143    Order Status: Completed Specimen: Blood Updated: 11/01/21 0659     Special Requests: NO SPECIAL REQUESTS        Culture result: NO GROWTH AFTER 9 HOURS       CULTURE, Gia Alexandru [[id number]] Collected: 11/01/21 0141    Order Status: Completed Specimen: Foot Updated: 11/01/21 0200          Other pertinent lab: none    Total time spent with patient: 30 Minutes I personally reviewed chart, notes, data and current medications in the medical record. I have personally examined and treated the patient at bedside during this period.                  Care Plan discussed with: Patient, Care Manager, Nursing Staff, Consultant/Specialist and >50% of time spent in counseling and coordination of care    Discussed:  Care Plan and D/C Planning    Prophylaxis:  H2B/PPI    Disposition:  Home w/Family           ___________________________________________________    Attending Physician: Eddie Livingston MD

## 2021-11-01 NOTE — PROGRESS NOTES
[address] [address] [facility] Pharmacy Progress Note: Antimicrobial Stewardship    Consult for antibiotic dosing of Vancomycin by Dr. Radha Rodriguez  Indication: skin and soft tissue infection  Day of Therapy: 1    Plan:  Vancomycin therapy:   Start with loading dose of vancomycin 2,000 mg IV (25 mg/kg, max 2.5 gm)   Follow with maintenance dose of vancomycin 1,250 mg IV every 24 hours    Dose calculated to approximate a   Target AUC/NATHAN of less than 500  Trough of 10-15 mcg/mL. Serum Creatinine     Lab Results   Component Value Date/Time    Creatinine 1.52 (H) 10/31/2021 09:43 PM    Creatinine (POC) 1.0 05/24/2019 12:45 AM       Creatinine Clearance Estimated Creatinine Clearance: 53.2 mL/min (A) (based on SCr of 1.52 mg/dL (H)). Procalcitonin    Lab Results   Component Value Date/Time    Procalcitonin 0.11 10/31/2021 09:43 PM        Temp   97.3 °F (36.3 °C)    WBC   Lab Results   Component Value Date/Time    WBC 11.7 (H) 10/31/2021 09:43 PM       For Antifungals, Metronidazole and Nafcillin: Lab Results   Component Value Date/Time    ALT (SGPT) 51 10/31/2021 09:43 PM    AST (SGOT) 44 (H) 10/31/2021 09:43 PM    Alk.  phosphatase 170 (H) 10/31/2021 09:43 PM    Bilirubin, total 0.5 10/31/2021 09:43 PM         Pharmacist: Avril Mcgregor

## 2021-11-01 NOTE — CONSULTS
The [address] [place] [address] Podiatric Surgery  H & P Note    Date: November 1, 2021  Patient: Armando Dorado  MR #: [id number]  [facility] #: [id number]  Attending/Admitting Physician: Dr Crystal Wyatt MD    Reason for Consult:  Dr Crystal Wyatt MD asked me to see Armando Dorado for evaluation and treatment of right foot infection    History of Present Illness:  Patient is a 66 y.o. male  Wmale with a past medical history of diabetes, coronary artery disease, prostate cancer, von Willebrand disease , diabetes, neuropathy admitted with right foot ulcers and infection. Previously Dr. Angeles May but does not want to see him. Mr. Thuy Parmar states that about 2 weeks ago he developed an ulcer on the bottom of his right foot. He then began to experience worsening redness, swelling and drainage. He saw his primary care doctor who he states prescribed him Bactrim, and advised him to come to the emergency room if his foot does not improve. He endorses associated fevers. He does endorse multiple surgeries on his right foot due to hammertoes; and, he states he has had issues with his foot since then. He states he does not follow with podiatrist. Currently on empiric antibiotics. Radiographs and CT obtained. ROS:   Constitutional: Negative for chills, weight loss and malaise/fatigue. Positive for fevers  HENT: Negative for nosebleeds and congestion. Eyes: Negative for blurred vision and double vision. Respiratory: Negative for cough, shortness of breath and wheezing. Cardiovascular: Negative for chest pain, palpitations, claudication and positive for leg swelling. Gastrointestinal: Negative for heartburn, nausea, vomiting, abdominal pain and diarrhea. Genitourinary: Negative for dysuria and urgency. Musculoskeletal: Negative for myalgias and joint pain. Skin: Negative for itching and rash. Positive for redness and open wounds  Neurological: Negative for dizziness, focal weakness and headaches. Endo/Heme/Allergies: Negative for environmental allergies. Does not bruise/bleed easily. Psychiatric/Behavioral: Negative for depression and suicidal ideas. The patient is not nervous/anxious. Positive for numbness     Blood pressure 117/68, pulse 85, temperature 99.5 °F (37.5 °C), resp. rate 18, height 5' 11\" (1.803 m), weight 104.3 kg (230 lb), SpO2 94 %. No intake or output data in the 24 hours ending 11/01/21 1121  Medical History:  Past Medical History:   Diagnosis Date    Anxiety and depression     Arthritis     CAD (coronary artery disease) 03/2018    [place]ABG[place]    Chronic pain     LEFT KNEE    CKD (chronic kidney disease), stage III (HCC)     DM type 2 causing renal disease (HCC)     DM type 2 causing vascular disease ([address]     [facility]     Von Willebrand disease ([facility] [facility]      Past Surgical History:   Procedure Laterality Date    HX APPENDECTOMY  1979    HX ARTERIAL BYPASS      HX CATARACT REMOVAL Bilateral 2016, 2011    HX GI  2017    COLONOSCOPY WITH NO POLYPS    HX HEENT  1949    T&A    HX HEENT  1972    EXTRACTION OF WISDOM TEETH X 4 (2 SURGERIES 2 TEETH AT A TIME)    HX ORTHOPAEDIC  02/2006    RIGHT HAMMER TOES X 4 FIXED    HX OTHER SURGICAL  1963    RIGHT INNER THIGH CEBACIOUS CYST REMOVED    HX PROSTATECTOMY  06/2012    RADICAL PROSTATECTOMY    DE ABDOMEN SURGERY PROC UNLISTED  1954    RIGHT CONGENITAL INGUINAL HERNIA REPAIR    DE ABDOMEN SURGERY PROC UNLISTED  1991    LEFT INGUINAL HERNIA REPAIR    DE ABDOMEN SURGERY [address] UNLISTED      SIGMOIDOSCOPY-FOR HEMMORIODS.  DE CABG, ARTERY-VEIN, FOUR  03/2018    CABG    DE TOTAL HIP ARTHROPLASTY Right 2017     Allergies   Allergen Reactions    Ddavp [Desmopressin] Other (comments)     KIDNEY ISSUES. Family History   Problem Relation Age of Onset    Cancer Mother         ovarian?     Cancer Father         PROSTATE CA, & NON MALIGNANT TUMOR SURGERY THAT HE DID NOT RECOVER FROM & HAD RENAL SURGERY    Other Father BRAIN TUMOR    Kidney Disease Father         RENAL FAILURE    No Known Problems Sister     No Known Problems Brother     No Known Problems Daughter     No Known Problems Son     Anesth Problems Neg Hx      Social History     Tobacco Use   Smoking Status Never Smoker   Smokeless Tobacco Never Used   Tobacco Comment    h/o PIPE SMOKING IN 1970's, STOPPED     Social History     Substance and Sexual Activity   Drug Use No     Social History     Substance and Sexual Activity   Alcohol Use Yes    Comment: RARE       Labs:  Lab Results   Component Value Date/Time    WBC 7.1 11/01/2021 01:48 AM    HGB 13.6 11/01/2021 01:48 AM    HCT 40.9 11/01/2021 01:48 AM    MCV 96.9 11/01/2021 01:48 AM    PLATELET 584 45/78/9943 01:48 AM     Lab Results   Component Value Date/Time    Sodium 132 (L) 11/01/2021 01:48 AM    Potassium 4.9 11/01/2021 01:48 AM    Chloride 102 11/01/2021 01:48 AM    CO2 22 11/01/2021 01:48 AM    BUN 19 11/01/2021 01:48 AM    Calcium 8.7 11/01/2021 01:48 AM      Lab Results   Component Value Date/Time    Glucose 225 (H) 11/01/2021 01:48 AM     Lab Results   Component Value Date/Time    INR 1.0 07/01/2019 12:43 PM    No components found for: PTT  Recent Labs     11/01/21  0148 10/31/21  2143   * 312*     Physical Exam:  General appearance: alert, cooperative, appears stated age    Lower Extremity Exam:  Vascular:    Dorsalis Pedis Pulse: present  Posterior Tibialis Pulse: present  Popliteal and Femoral Pulses: present  Skin Temp: warm to warm right legs to toes  Extremity Edema:non pitting right forefoot  Varicosities: present right and left lower extremities    Neurological:  Deep Tendon Reflexes of Achilles and Patellar: intact right and left  Epicritic and Protective Sensations: absent right and left foot  Deep Pain Response: absent right and left foot    Dermatological:  Toenails: mycotic 1-5 right and left  Ulceration:  Plantar right first metatarsal head ulcer with sinusing wounds around the great toe. Steve purulence noted to the wound bed  Surrounding erythema noted to the first ray. Musculoskeletal:  Muscle Strength of Major Muscle Groups: 4/5 right and left lower extremities  Stability: diminished right and left  Range of Motion: diminished at ankle. Fused 2nd and 3rd pipjs  Limb Deformities: hallux valgus right and left    Imaging Modalities:   Xray  IMPRESSION  Soft tissue swelling with soft tissue gas and lateral ulceration of  the great toe suspicious for gas-forming cellulitis with no plain film evidence  of osteomyelitis and chronic changes as above. CT  Diffuse edema, more prominent at the great toe with areas of soft tissue gas  about the first toe extending to the level of the mid metatarsal underlying  large plantar ulceration on the plantar aspect and along the extensor tendon  tract dorsally. No CT evidence of osteomyelitis. No clearly defined organized  abscess collection. Vascular Studies:venous duplex pending    Microbiological:pending    Impression:  1. Right foot cellulitis   2. Right foot abscess  3. Right foot gangrene  4. Osteomyelitis right foot hallux and first metatarsal    Plan:  - Evaluation and medical management of right foot severe diabetic foot infection with systemic signs of infection. Patient has evidence of abscess as well as osteomyelitis with cytosine wounds and gangrene to the right hallux extending to the first metatarsophalangeal joint. Due to these findings I recommended urgent surgical intervention this evening with definitive amputation of the great toe and first metatarsal as well as incision and drainage of abscess. I've explained the patient that this likely will be a staged approach for limb salvage. Continue empiric antibiotics. I will obtained cultures and specimens for pathology. I discussed the procedures at length including the expected postoperative course, risks, alternatives, and possible complications.  No guarantees were given and the patient has elected to proceed. The nature of the finding, probable diagnosis and likely treatment was thoroughly discussed with the patient. The options, risks, complications, alternative treatment as well as some of the differential diagnosis was discussed. The patient was thoroughly informed and all questions were answered. the patient indicated understanding and satisfaction with the discussion. Andrea Kimble.  JOSTIN Rosa FACFAS FACCProvidence Seward Medical and Care Center  Triple Board Certified Foot & Ankle Specialist  [phone] cell  11/1/2021  11:21 AM

## 2021-11-01 NOTE — PROGRESS NOTES
Pt.right foot is red and swollen. Drainage is present with odor. Foot presents with tunneling on backside of foot. Pt denies any feeling  Has neuropathy. RLE has edema with plus 3 pitting. Wound was dressed by podiatry staff.

## 2021-11-01 NOTE — ED PROVIDER NOTES
HPI the patient is diabetic and presents with an infection of his right big toe for approximately 2 weeks. He has been on antibiotics for 10 days and it has gotten worse over the past 2 days. He thinks he is also had fever. He denies nausea/vomiting or other systemic symptoms. Past Medical History:   Diagnosis Date    Arthritis     CAD (coronary artery disease) 03/2018    [place]    Cancer ([facility] 2012    Prostate    Chronic pain     LEFT KNEE    Diabetes ([facility]     NIDDM    Ill-defined condition     ISSUES WITH CLOTTING FACTOR VIII    Psychiatric disorder     DEPRESSION    Von Willebrand disease ([facility]        Past Surgical History:   Procedure Laterality Date    ABDOMEN SURGERY PROC UNLISTED  1954    RIGHT CONGENITAL INGUINAL HERNIA REPAIR    ABDOMEN SURGERY PROC UNLISTED  1991    LEFT INGUINAL HERNIA REPAIR    ABDOMEN SURGERY [address] UNLISTED      SIGMOIDOSCOPY-FOR HEMMORIODS.  CABG, ARTERY-VEIN, FOUR  03/2018    CABG    HX APPENDECTOMY  1979    HX ARTERIAL BYPASS      HX CATARACT REMOVAL Bilateral 2016, 2011    HX GI  2017    COLONOSCOPY WITH NO POLYPS    HX HEENT  1949    T&A    HX HEENT  1972    EXTRACTION OF WISDOM TEETH X 4 (2 SURGERIES 2 TEETH AT A TIME)    HX ORTHOPAEDIC  02/2006    RIGHT HAMMER TOES X 4 FIXED    HX OTHER SURGICAL  1963    RIGHT INNER THIGH CEBACIOUS CYST REMOVED    HX PROSTATECTOMY  06/2012    RADICAL PROSTATECTOMY    TOTAL HIP ARTHROPLASTY Right 2017         Family History:   Problem Relation Age of Onset    Cancer Mother         ovarian?     Cancer Father         PROSTATE CA, & NON MALIGNANT TUMOR SURGERY THAT HE DID NOT RECOVER FROM & HAD RENAL SURGERY    Other Father         BRAIN TUMOR    Kidney Disease Father         RENAL FAILURE    No Known Problems Sister     No Known Problems Brother     No Known Problems Daughter     No Known Problems Son     Anesth Problems Neg Hx        Social History     Socioeconomic History    Marital status: LIFE PARTNER Spouse name: Not on file    Number of children: Not on file    Years of education: Not on file    Highest education level: Not on file   Occupational History    Not on file   Tobacco Use    Smoking status: Never Smoker    Smokeless tobacco: Never Used    Tobacco comment: h/o PIPE SMOKING IN 1970's, STOPPED   Substance and Sexual Activity    Alcohol use: Yes     Comment: RARE    Drug use: No    Sexual activity: Not Currently   Other Topics Concern    Not on file   Social History Narrative    Not on file     Social Determinants of Health     Financial Resource Strain:     Difficulty of Paying Living Expenses:    Food Insecurity:     Worried About Running Out of Food in the Last Year:     [address] in the Last Year:    Transportation Needs:     Lack of Transportation (Medical):  Lack of Transportation (Non-Medical):    Physical Activity:     Days of Exercise per Week:     Minutes of Exercise per Session:    Stress:     Feeling of Stress :    Social Connections:     Frequency of Communication with Friends and Family:     Frequency of Social Gatherings with Friends and Family:     Attends Mandaen Services:     Active Member of Clubs or Organizations:     Attends Club or Organization Meetings:     Marital Status:    Intimate Partner Violence:     Fear of Current or Ex-Partner:     Emotionally Abused:     Physically Abused:     Sexually Abused: ALLERGIES: Ddavp [desmopressin]    Review of Systems   Constitutional: Positive for fever. HENT: Negative for voice change. Eyes: Negative for visual disturbance. Respiratory: Negative for cough and shortness of breath. Cardiovascular: Negative for chest pain. Gastrointestinal: Negative for abdominal pain, nausea and vomiting. Genitourinary: Negative for flank pain. Musculoskeletal: Negative for back pain. Skin: Positive for color change and wound. Negative for rash. Neurological: Negative for headaches. Psychiatric/Behavioral: Negative for confusion. Vitals:    10/31/21 2125   BP: 138/77   Pulse: 90   Resp: 18   Temp: 97.3 °F (36.3 °C)   SpO2: 96%   Weight: 104.3 kg (230 lb)   Height: 5' 11\" (1.803 m)            Physical Exam  Constitutional:       General: He is not in acute distress. Appearance: He is well-developed. HENT:      Head: Normocephalic. Cardiovascular:      Rate and Rhythm: Normal rate. Heart sounds: No murmur heard. Pulmonary:      Effort: Pulmonary effort is normal.      Breath sounds: Normal breath sounds. Abdominal:      Palpations: Abdomen is soft. Tenderness: There is no abdominal tenderness. Musculoskeletal:         General: Normal range of motion. Cervical back: Normal range of motion. Skin:     General: Skin is warm and dry. Capillary Refill: Capillary refill takes less than 2 seconds. Comments: The right foot has a necrotic foul-smelling ulcer on the plantar surface of the big toe MTP. The dorsum of the foot is red swollen and warm to touch. Neurological:      Mental Status: He is alert. Psychiatric:         Behavior: Behavior normal.          MDM       Procedures  Perfect Serve Consult for Admission  10:23 PM    ED Room Number: ER09/09  Patient Name and age: [place] Nur 66 y.o.  male  Working Diagnosis:   1. Diabetic foot infection ([address]        COVID-19 Suspicion:  no  Sepsis present:  no  Reassessment needed: no  Code Status:  Full Code  Readmission: no  Isolation Requirements:  no  Recommended Level of Care:  med/surg  Department:[facility] ED - [phone]  Other: Patient has been on Bactrim for 10 days for a diabetic foot ulcer. It has gotten worse over the past few days and now his ulcer has a foul-smelling discharge and the foot is swollen, red and warm.

## 2021-11-01 NOTE — CONSULTS
ORTHO CONSULT NOTE    Date of Consultation:  November 1, 2021  Referring Physician:  Lorraine Pérez  CC: right foot ulcer/infection    HPI:  Balbina Farah is a 66 y.o. male PMH DM with neuropathy, CABG, Von Willebrand, THR/TKR Dr. Reema Weiss, and right foot 2-5 claw toe corrections by podiatry who c/o right foot infection for 2 weeks. He reports intermittent ulceration plantar 1st metatarsal and cellulitis that normally responds to Augmentin. He noted erythema, edema, and wound 2 weeks ago with no improvement on Bactrim from PCP. Reports Tmax 100. Ortho and Podiatry consulted tonight for potential necrotizing fasciitis since gas seen on xray. He ambulates with cane. He has dense neuropathy R > L but doesn't wear diabetic shoes. Past Medical History:   Diagnosis Date    Arthritis     CAD (coronary artery disease) 03/2018    CABG, [place]    Cancer ([facility] 2012    Prostate    Chronic pain     LEFT KNEE    Diabetes ([facility]     NIDDM    Ill-defined condition     ISSUES WITH CLOTTING FACTOR VIII    Psychiatric disorder     DEPRESSION    Von Willebrand disease ([facility]       Past Surgical History:   Procedure Laterality Date    ABDOMEN SURGERY PROC UNLISTED  1954    RIGHT CONGENITAL INGUINAL HERNIA REPAIR    ABDOMEN SURGERY PROC UNLISTED  1991    LEFT INGUINAL HERNIA REPAIR    ABDOMEN SURGERY [address] UNLISTED      SIGMOIDOSCOPY-FOR HEMMORIODS.     CABG, ARTERY-VEIN, FOUR  03/2018    CABG    HX APPENDECTOMY  1979    HX ARTERIAL BYPASS      HX CATARACT REMOVAL Bilateral 2016, 2011    HX GI  2017    COLONOSCOPY WITH NO POLYPS    HX HEENT  1949    T&A    HX HEENT  1972    EXTRACTION OF WISDOM TEETH X 4 (2 SURGERIES 2 TEETH AT A TIME)    HX ORTHOPAEDIC  02/2006    RIGHT HAMMER TOES X 4 FIXED    HX OTHER SURGICAL  1963    RIGHT INNER THIGH CEBACIOUS CYST REMOVED    HX PROSTATECTOMY  06/2012    RADICAL PROSTATECTOMY    TOTAL HIP ARTHROPLASTY Right 2017      Family History   Problem Relation Age of Onset    Cancer Mother ovarian?  Cancer Father         PROSTATE CA, & NON MALIGNANT TUMOR SURGERY THAT HE DID NOT RECOVER FROM & HAD RENAL SURGERY    Other Father         BRAIN TUMOR    Kidney Disease Father         RENAL FAILURE    No Known Problems Sister     No Known Problems Brother     No Known Problems Daughter     No Known Problems Son     Anesth Problems Neg Hx       Social History     Tobacco Use    Smoking status: Never Smoker    Smokeless tobacco: Never Used    Tobacco comment: h/o PIPE SMOKING IN 1970's, STOPPED   Substance Use Topics    Alcohol use: Yes     Comment: RARE     Allergies   Allergen Reactions    Ddavp [Desmopressin] Other (comments)     KIDNEY ISSUES. Review of Systems:  Per HPI. Objective:     Patient Vitals for the past 8 hrs:   BP Temp Pulse Resp SpO2 Height Weight   21 0105 (!) 154/80 99.6 °F (37.6 °C) 98 18 95 %     10/31/21 2125 138/77 97.3 °F (36.3 °C) 90 18 96 % 5' 11\" (1.803 m) 104.3 kg (230 lb)     Temp (24hrs), Av.5 °F (36.9 °C), Min:97.3 °F (36.3 °C), Max:99.6 °F (37.6 °C)      EXAM:   NAD. Answers questions appropriately. BLE hip/knee NTTP. No pain logroll bilat hip. No pain ROM left knee. RLE has obvious edema and erythema shin and foot. He has obvious focus of erythema/edema great toe with superficial skin slough. No bullae. There are multiple wounds including 5mm diameter wound plantar 1st metatarsal head with 5cm proximal tunneling. There is also smaller wound plantar proximal phalanx great toe with protruding necrotic tissue. Wound tunnels 2 cm dorsally. Each wound has serous drainage with some purulence and malodor. Dorsal lateral great toe has area of fluctuance. Decreased sens to light touch right foot, especially plantar surface. Unable to palp pulses d/t edema right foot but CR < 2 secs each toe. Foot proximal to wound has no skin crepitus dorsal/plantar/medial aspects.                       Imaging Review:   Results from [facility] Encounter encounter on 10/31/21    XR FOOT RT MIN 3 V    Narrative  EXAM: XR FOOT RT MIN 3 V    INDICATION: Diabetic with infection of the great toe for 2 weeks. COMPARISON: None. FINDINGS: Three views of the right foot demonstrate extensive soft tissue  swelling with soft tissue gas of the great toe with large soft tissue ulceration  at the plantar surface. There is broad soft tissue swelling of the entire foot. There is a chronic appearing fracture of the distal aspect of the middle phalanx  of the fifth toe and chronic remodeling at the metatarsophalangeal joint of the  second ray. No acute fracture is identified and no bone destruction is  identified. no fracture or other acute osseous or articular abnormality. The  soft tissues are within normal limits. Impression  Soft tissue swelling with soft tissue gas and lateral ulceration of  the great toe suspicious for gas-forming cellulitis with no plain film evidence  of osteomyelitis and chronic changes as above. Labs:   WBC 11.7. BUN 23, Cr 1.52, eGFR 45. Lactic 2.0.  Glucose 312. Blood/wound cultures here pending. Outside wound culture dated 10/22/21:          Impression:     Patient Active Problem List    Diagnosis Date Noted    Diabetic foot ulcer ([facility] 10/31/2021    Primary osteoarthritis of left knee 08/05/2019    Primary osteoarthritis of right hip 10/30/2017     Active Problems:    Diabetic foot ulcer ([address] (10/31/2021)      Tunneling diabetic foot ulcer with cellulitis but I don't see evidence of necrotizing fasciitis. Plan:   Before driving in to see patient urgently, I ordered stat foot CT which has not yet been completed. This will help assess presence of gas and extent of infection. I removed some superficial necrotic tissue at bedside and dressed with 1\" NS packing (trimmed lengthwise to 1/2\"), xeroform, 4x4s, and kerlix. Elevate RLE. IV abx (clindamycin, meropenem, and vancomycin) initiated by medicine.   I am attempting to reach Dr. Cisco Wing but anticipate deferring care to podiatry.     SAÚL North  Orthopedic Trauma Service  [facility]

## 2021-11-01 NOTE — PROGRESS NOTES
Ortho Note:    Confirmed pt has been seen by podiatry with plan as per podiatrist, ortho will sign off at this time. Please reconsult if any further concerns or assistance needed.

## 2021-11-01 NOTE — PROGRESS NOTES
Occupational therapy note:  Orders received, chart reviewed. Per MD, patient requires stat MRI and likely surgery following. Will hold OT evaluation and follow up with patient once plan known. Mercedes Carlson MS OTR/L

## 2021-11-01 NOTE — PROGRESS NOTES
Physical Therapy Note:    Orders acknowledged, chart reviewed, PT evaluation on hold. Pt awaiting R foot MRI and podiatry consult to determine plan of care. Will continue to follow and proceed with PT evaluation when appropriate.     Ze Russell, PT, DPT, Buzz Easley

## 2021-11-01 NOTE — PROGRESS NOTES
Encouraging pt to call for assistanc.  To ask question and continue to work on [employer] by discharge

## 2021-11-01 NOTE — PROGRESS NOTES
Admission Medication Reconciliation:    Comments/Recommendations:  -Medication history obtained in room 525  -Confirmed allergy history - desmopressin caused loss of kidney function    Medications added: Svitlana Mccormick (patient recently started this as a sample from MD office), Bactrim  Medications removed: Clopidogrel, hydrochlorothiazide, meloxicam, testosterone, potassium 99  Medications changed: None    Information obtained from: Patient, RxQuery, chart review    Allergies:  Ddavp [desmopressin]    Prior to Admission Medications:   Prior to Admission Medications   Prescriptions Last Dose Informant Patient Reported? Taking?   aspirin delayed-release 81 mg tablet 10/31/2021 at AM  Yes Yes   Sig: Take 81 mg by mouth daily. diazePAM (VALIUM) 5 mg tablet 10/25/2021 at Unknown time  Yes Yes   Sig: Take 5 mg by mouth as needed for Anxiety. insulin glargine-lixisenatide (Soliqua 100/33) 100 unit-33 mcg/mL inpn 10/31/2021 at Unknown time  Yes Yes   Si Units by SubCUTAneous route nightly. irbesartan (AVAPRO) 150 mg tablet 10/31/2021 at AM  Yes Yes   Sig: Take 150 mg by mouth daily. IN AM   metFORMIN (GLUCOPHAGE) 500 mg tablet 10/31/2021 at AM  Yes Yes   Sig: Take 500 mg by mouth two (2) times daily (with meals). pioglitazone (ACTOS) 15 mg tablet 10/31/2021 at AM  Yes Yes   Sig: Take 15 mg by mouth daily. simvastatin (ZOCOR) 40 mg tablet 10/31/2021 at PM  Yes Yes   Sig: Take 40 mg by mouth nightly. trimethoprim-sulfamethoxazole (Bactrim DS) 160-800 mg per tablet 10/31/2021 at AM  Yes Yes   Sig: Take 1 Tablet by mouth two (2) times a day.       Facility-Administered Medications: None     Thank you,  Kaya Valero, PHARMD

## 2021-11-01 NOTE — PROGRESS NOTES
Spiritual Care Partner Volunteer visited patient at [address] [facility] in [address] [place] [address] on 11/1/2021   Documented by:  SAHIL MartinezDiv.  [phone]

## 2021-11-01 NOTE — ED NOTES
TRANSFER - OUT REPORT:    Verbal report given to Jaswinder Doshi RN (name) on Viry Flores  being transferred to Med/Surg (unit) for routine progression of care       Report consisted of patients Situation, Background, Assessment and   Recommendations(SBAR). Information from the following report(s) SBAR, Kardex, ED Summary and MAR was reviewed with the receiving nurse. Lines:       Opportunity for questions and clarification was provided.       Patient transported with:   Monitor  Registered Nurse

## 2021-11-01 NOTE — PROGRESS NOTES
11/1/2021  11:08 AM  CM completed assessment w/ pt in person, CM wore mask and goggles at all times. Charted demographics verified, pt lies alone in a GF apt, there are 5 steps to front door. At baseline pt is ambulatory, uses cane for mobility, independent w/ ADLs, derives, friend can assist pt if needed   Reason for Admission:  Emergent for Diabetic Foot Ulcer  Pt is a 67 yo  male who [facility] to [facility] c/o worsening swelling, redness and drainage from foot ulcer on bottom of R foot  PMHx: DM, CAD, prostate CA, von Willebrand's disease                RUR Score:    7 % low Risk of Readmission/Green           Plan for utilizing home health:  No history, PT, OT evals pending  DME: cane, pt owns RW, WC  Rx: [employer], pt uses [employer] fully covered  COVID Vax Hx: Andrae Crespo, completed series        PCP: First and Last name:  Quyen Moran MD     Name of Practice:    Are you a current patient: Yes/No: yes    Approximate date of last visit: 2 weeks   Can you participate in a virtual visit with your PCP: yes                     Current Advanced Directive/Advance Care Plan: Full Code  [facility] Breannaradhadionicioallyson Jameson. Luzma (c) [phone]    Healthcare Decision Maker:   Click here to complete Synapt[employer] including selection of the Healthcare Decision Maker Relationship (ie \"Primary\")                             Transition of Care Plan:                    RUR 7 %  LOS 1 Day  1. Hospital admission for medical management  2. Wound Care consult  3. PT, OT evals  4. Ortho consult  5. Podiatry consult  6. CM to follow through for treatment/response  7. DC when stable to home w/ HH vs none  8. Outpatient f/u PCP, specialists  9. [facility] resources  10. Friend will transport    Care Management Interventions  PCP Verified by CM:  Yes Nia Stallings MD, last visit 2 weeks)  Palliative Care Criteria Met (RRAT>21 & CHF Dx)?: No  Mode of Transport at Discharge: Self (Friend)  Transition of Care Consult (CM Consult): Discharge Planning  Physical Therapy Consult: Yes  Occupational Therapy Consult: Yes  Speech Therapy Consult: No  Support Systems: Friend/Neighbor (pt lvies alone in pvt residence, at [address] pt is ambualtory)  Confirm Follow Up Transport: Self  Discharge Location  Discharge Placement: Home with home health  BAYRON Ring

## 2021-11-02 LAB
ALBUMIN SERPL-MCNC: 2.1 G/DL (ref 3.5–5)
ALBUMIN/GLOB SERPL: 0.5 {RATIO} (ref 1.1–2.2)
ALP SERPL-CCNC: 121 U/L (ref 45–117)
ALT SERPL-CCNC: 43 U/L (ref 12–78)
ANION GAP SERPL CALC-SCNC: 5 MMOL/L (ref 5–15)
AST SERPL-CCNC: 29 U/L (ref 15–37)
BASOPHILS # BLD: 0.1 K/UL (ref 0–0.1)
BASOPHILS NFR BLD: 1 % (ref 0–1)
BILIRUB SERPL-MCNC: 0.5 MG/DL (ref 0.2–1)
BUN SERPL-MCNC: 13 MG/DL (ref 6–20)
BUN/CREAT SERPL: 15 (ref 12–20)
CALCIUM SERPL-MCNC: 8 MG/DL (ref 8.5–10.1)
CHLORIDE SERPL-SCNC: 104 MMOL/L (ref 97–108)
CO2 SERPL-SCNC: 25 MMOL/L (ref 21–32)
CREAT SERPL-MCNC: 0.87 MG/DL (ref 0.7–1.3)
DIFFERENTIAL METHOD BLD: ABNORMAL
EOSINOPHIL # BLD: 0.2 K/UL (ref 0–0.4)
EOSINOPHIL NFR BLD: 3 % (ref 0–7)
ERYTHROCYTE [DISTWIDTH] IN BLOOD BY AUTOMATED COUNT: 13.5 % (ref 11.5–14.5)
GLOBULIN SER CALC-MCNC: 4.2 G/DL (ref 2–4)
GLUCOSE BLD STRIP.AUTO-MCNC: 198 MG/DL (ref 65–117)
GLUCOSE BLD STRIP.AUTO-MCNC: 210 MG/DL (ref 65–117)
GLUCOSE BLD STRIP.AUTO-MCNC: 241 MG/DL (ref 65–117)
GLUCOSE BLD STRIP.AUTO-MCNC: 276 MG/DL (ref 65–117)
GLUCOSE SERPL-MCNC: 231 MG/DL (ref 65–100)
HCT VFR BLD AUTO: 31.6 % (ref 36.6–50.3)
HGB BLD-MCNC: 10.2 G/DL (ref 12.1–17)
IMM GRANULOCYTES # BLD AUTO: 0.2 K/UL (ref 0–0.04)
IMM GRANULOCYTES NFR BLD AUTO: 2 % (ref 0–0.5)
LYMPHOCYTES # BLD: 1.3 K/UL (ref 0.8–3.5)
LYMPHOCYTES NFR BLD: 15 % (ref 12–49)
MAGNESIUM SERPL-MCNC: 1.8 MG/DL (ref 1.6–2.4)
MCH RBC QN AUTO: 31.4 PG (ref 26–34)
MCHC RBC AUTO-ENTMCNC: 32.3 G/DL (ref 30–36.5)
MCV RBC AUTO: 97.2 FL (ref 80–99)
MONOCYTES # BLD: 1 K/UL (ref 0–1)
MONOCYTES NFR BLD: 11 % (ref 5–13)
NEUTS SEG # BLD: 6.5 K/UL (ref 1.8–8)
NEUTS SEG NFR BLD: 70 % (ref 32–75)
NRBC # BLD: 0 K/UL (ref 0–0.01)
NRBC BLD-RTO: 0 PER 100 WBC
PHOSPHATE SERPL-MCNC: 2.7 MG/DL (ref 2.6–4.7)
PLATELET # BLD AUTO: 224 K/UL (ref 150–400)
PMV BLD AUTO: 10.3 FL (ref 8.9–12.9)
POTASSIUM SERPL-SCNC: 5 MMOL/L (ref 3.5–5.1)
PROT SERPL-MCNC: 6.3 G/DL (ref 6.4–8.2)
RBC # BLD AUTO: 3.25 M/UL (ref 4.1–5.7)
SERVICE CMNT-IMP: ABNORMAL
SODIUM SERPL-SCNC: 134 MMOL/L (ref 136–145)
WBC # BLD AUTO: 9.3 K/UL (ref 4.1–11.1)

## 2021-11-02 PROCEDURE — 74011250636 HC RX REV CODE- 250/636: Performed by: INTERNAL MEDICINE

## 2021-11-02 PROCEDURE — 84100 ASSAY OF PHOSPHORUS: CPT

## 2021-11-02 PROCEDURE — 74011250637 HC RX REV CODE- 250/637: Performed by: INTERNAL MEDICINE

## 2021-11-02 PROCEDURE — 77010033678 HC OXYGEN DAILY

## 2021-11-02 PROCEDURE — 85025 COMPLETE CBC W/AUTO DIFF WBC: CPT

## 2021-11-02 PROCEDURE — 74011000250 HC RX REV CODE- 250: Performed by: INTERNAL MEDICINE

## 2021-11-02 PROCEDURE — 83735 ASSAY OF MAGNESIUM: CPT

## 2021-11-02 PROCEDURE — 97535 SELF CARE MNGMENT TRAINING: CPT

## 2021-11-02 PROCEDURE — 65270000029 HC RM PRIVATE

## 2021-11-02 PROCEDURE — 2709999900 HC NON-CHARGEABLE SUPPLY

## 2021-11-02 PROCEDURE — 97530 THERAPEUTIC ACTIVITIES: CPT

## 2021-11-02 PROCEDURE — 94760 N-INVAS EAR/PLS OXIMETRY 1: CPT

## 2021-11-02 PROCEDURE — 82962 GLUCOSE BLOOD TEST: CPT

## 2021-11-02 PROCEDURE — 80053 COMPREHEN METABOLIC PANEL: CPT

## 2021-11-02 PROCEDURE — 36415 COLL VENOUS BLD VENIPUNCTURE: CPT

## 2021-11-02 PROCEDURE — 74011636637 HC RX REV CODE- 636/637: Performed by: INTERNAL MEDICINE

## 2021-11-02 PROCEDURE — 97165 OT EVAL LOW COMPLEX 30 MIN: CPT

## 2021-11-02 PROCEDURE — 99222 1ST HOSP IP/OBS MODERATE 55: CPT | Performed by: INTERNAL MEDICINE

## 2021-11-02 PROCEDURE — 97162 PT EVAL MOD COMPLEX 30 MIN: CPT

## 2021-11-02 RX ORDER — ASPIRIN 81 MG/1
81 TABLET ORAL DAILY
Status: DISCONTINUED | OUTPATIENT
Start: 2021-11-03 | End: 2021-11-09 | Stop reason: HOSPADM

## 2021-11-02 RX ORDER — DIAZEPAM 5 MG/1
5 TABLET ORAL
Status: DISCONTINUED | OUTPATIENT
Start: 2021-11-02 | End: 2021-11-09 | Stop reason: HOSPADM

## 2021-11-02 RX ORDER — VALSARTAN 80 MG/1
80 TABLET ORAL DAILY
Status: DISCONTINUED | OUTPATIENT
Start: 2021-11-03 | End: 2021-11-04

## 2021-11-02 RX ORDER — ENOXAPARIN SODIUM 100 MG/ML
40 INJECTION SUBCUTANEOUS EVERY 24 HOURS
Status: DISCONTINUED | OUTPATIENT
Start: 2021-11-03 | End: 2021-11-09 | Stop reason: HOSPADM

## 2021-11-02 RX ORDER — ATORVASTATIN CALCIUM 20 MG/1
20 TABLET, FILM COATED ORAL
Status: DISCONTINUED | OUTPATIENT
Start: 2021-11-02 | End: 2021-11-08

## 2021-11-02 RX ORDER — METRONIDAZOLE 500 MG/100ML
500 INJECTION, SOLUTION INTRAVENOUS EVERY 12 HOURS
Status: DISCONTINUED | OUTPATIENT
Start: 2021-11-02 | End: 2021-11-04

## 2021-11-02 RX ORDER — IRBESARTAN 150 MG/1
150 TABLET ORAL DAILY
Status: DISCONTINUED | OUTPATIENT
Start: 2021-11-03 | End: 2021-11-02 | Stop reason: CLARIF

## 2021-11-02 RX ORDER — INSULIN GLARGINE 100 [IU]/ML
15 INJECTION, SOLUTION SUBCUTANEOUS
Status: DISCONTINUED | OUTPATIENT
Start: 2021-11-02 | End: 2021-11-03

## 2021-11-02 RX ADMIN — HYDROMORPHONE HYDROCHLORIDE 1 MG: 1 INJECTION, SOLUTION INTRAMUSCULAR; INTRAVENOUS; SUBCUTANEOUS at 15:14

## 2021-11-02 RX ADMIN — ATORVASTATIN CALCIUM 20 MG: 20 TABLET, FILM COATED ORAL at 21:28

## 2021-11-02 RX ADMIN — INSULIN LISPRO 2 UNITS: 100 INJECTION, SOLUTION INTRAVENOUS; SUBCUTANEOUS at 17:42

## 2021-11-02 RX ADMIN — MEROPENEM 500 MG: 500 INJECTION, POWDER, FOR SOLUTION INTRAVENOUS at 06:12

## 2021-11-02 RX ADMIN — Medication 10 ML: at 21:33

## 2021-11-02 RX ADMIN — SODIUM CHLORIDE 75 ML/HR: 9 INJECTION, SOLUTION INTRAVENOUS at 13:27

## 2021-11-02 RX ADMIN — METRONIDAZOLE 500 MG: 500 INJECTION, SOLUTION INTRAVENOUS at 13:26

## 2021-11-02 RX ADMIN — INSULIN LISPRO 3 UNITS: 100 INJECTION, SOLUTION INTRAVENOUS; SUBCUTANEOUS at 08:52

## 2021-11-02 RX ADMIN — VANCOMYCIN HYDROCHLORIDE 1000 MG: 1 INJECTION, POWDER, LYOPHILIZED, FOR SOLUTION INTRAVENOUS at 21:32

## 2021-11-02 RX ADMIN — ACETAMINOPHEN 650 MG: 325 TABLET ORAL at 21:42

## 2021-11-02 RX ADMIN — CEFEPIME HYDROCHLORIDE 2 G: 2 INJECTION, POWDER, FOR SOLUTION INTRAVENOUS at 12:01

## 2021-11-02 RX ADMIN — INSULIN LISPRO 3 UNITS: 100 INJECTION, SOLUTION INTRAVENOUS; SUBCUTANEOUS at 12:03

## 2021-11-02 RX ADMIN — CEFEPIME HYDROCHLORIDE 2 G: 2 INJECTION, POWDER, FOR SOLUTION INTRAVENOUS at 21:28

## 2021-11-02 RX ADMIN — Medication 1 CAPSULE: at 08:52

## 2021-11-02 RX ADMIN — HYDROMORPHONE HYDROCHLORIDE 1 MG: 1 INJECTION, SOLUTION INTRAMUSCULAR; INTRAVENOUS; SUBCUTANEOUS at 06:12

## 2021-11-02 RX ADMIN — HYDROMORPHONE HYDROCHLORIDE 1 MG: 1 INJECTION, SOLUTION INTRAMUSCULAR; INTRAVENOUS; SUBCUTANEOUS at 00:41

## 2021-11-02 RX ADMIN — MEROPENEM 500 MG: 500 INJECTION, POWDER, FOR SOLUTION INTRAVENOUS at 00:41

## 2021-11-02 RX ADMIN — VANCOMYCIN HYDROCHLORIDE 1000 MG: 1 INJECTION, POWDER, LYOPHILIZED, FOR SOLUTION INTRAVENOUS at 12:02

## 2021-11-02 RX ADMIN — Medication 10 ML: at 13:27

## 2021-11-02 RX ADMIN — INSULIN LISPRO 3 UNITS: 100 INJECTION, SOLUTION INTRAVENOUS; SUBCUTANEOUS at 21:29

## 2021-11-02 RX ADMIN — HYDROMORPHONE HYDROCHLORIDE 1 MG: 1 INJECTION, SOLUTION INTRAMUSCULAR; INTRAVENOUS; SUBCUTANEOUS at 21:42

## 2021-11-02 RX ADMIN — CLINDAMYCIN PHOSPHATE 600 MG: 600 INJECTION, SOLUTION INTRAVENOUS at 03:44

## 2021-11-02 RX ADMIN — INSULIN GLARGINE 15 UNITS: 100 INJECTION, SOLUTION SUBCUTANEOUS at 21:28

## 2021-11-02 NOTE — PROGRESS NOTES
Problem: Mobility Impaired (Adult and Pediatric)  Goal: *Acute Goals and Plan of Care (Insert Text)  Description: FUNCTIONAL STATUS PRIOR TO ADMISSION: Patient was modified independent using a cane for community mobility and no device for household mobility. Drives private vehicle. HOME SUPPORT PRIOR TO ADMISSION: The patient lived alone with neighbors to provide assistance. Physical Therapy Goals  Initiated 11/2/2021  1. Patient will move from supine to sit and sit to supine  in bed with supervision/set-up within 7 day(s). 2.  Patient will transfer from bed to chair and chair to bed with minimal assistance/contact guard assist using the least restrictive device within 7 day(s). 3.  Patient will perform sit to stand with minimal assistance/contact guard assist within 7 day(s). 4.  Patient will ambulate with minimal assistance/contact guard assist for 10 feet with the least restrictive device while maintaining NWB RLE within 7 day(s). 5.  Patient will ascend/descend 3 stairs with one handrail(s) and 5 stairs with bilateral handrails with moderate assistance  within 7 day(s). Outcome: Not Met    PHYSICAL THERAPY EVALUATION  Patient: Meagan Seay (76 y.o. male)  Date: 11/2/2021  Primary Diagnosis: Diabetic foot ulcer ([facility] [E11.621, L97.509]  Procedure(s) (LRB):  INCISION AND DRAINAGE RIGHT FOOT, GREAT TOE AMPUTATION, FIRSTMETATARSAL AMPUTATION (Right) 1 Day Post-Op   Precautions:   NWB, Fall (RLE NWB); elevated RLE    ASSESSMENT  Based on the objective data described below, the patient presents with generally decreased strength, decreased endurance, dyspnea on exertion, and limited functional mobility after admission 11/1/21 with R foot abscess and gangrene. He is s/p R foot I&D with amputation of great toe and first metatarsal 11/1/21. Per podiatrist, who rounds during PT evaluation, pt is to return to OR later this week for additional procedure.  Pt educated regarding NWB RLE and mobility techniques within precautions. He is unable to attempt sit to stand transfers while NWB RLE. Pt performs lateral transfer to drop arm chair with multiple small squat pivots and mod assist. He requires cues for NWB RLE during this activity and presents with dyspnea after mobility. SpO2 90% and  after activity using room air; he recovers to SpO2 94% and HR 89. Home mobility techniques extensively discussed - pt states assurance that he will ambulate without difficulty when returning home, that multiple neighbors who are nurses will assist, and that he is unwilling to receive HHPT services. Current Level of Function Impacting Discharge (mobility/balance): mod assist squat pivot    Functional Outcome Measure: The patient scored 45 on the Barthel outcome measure which is indicative of 55% functional impairment. Other factors to consider for discharge: none additional     Patient will benefit from skilled therapy intervention to address the above noted impairments. PLAN :  Recommendations and Planned Interventions: bed mobility training, transfer training, gait training, therapeutic exercises, neuromuscular re-education, edema management/control, patient and family training/education, and therapeutic activities      Frequency/Duration: Patient will be followed by physical therapy:  5 times a week to address goals. Recommendation for discharge: (in order for the patient to meet his/her long term goals)  Therapy up to 5 days/week in SNF setting    This discharge recommendation:  Has not yet been discussed the attending provider and/or case management    IF patient discharges home will need the following DME: ambulance transport, wheelchair with drop arms and elevating leg rests, 24-hour assistance, HHPT. SUBJECTIVE:     Patient stated, \"I'm not going to be able to stand,\" when transfers attempted. Patient stated I'll be able to walk around re: home mobility.  Discussion held after transfer to chair and failed attempt at sit to stand transfer. Pt received supine, agreeable to PT and cleared by RN. OBJECTIVE DATA SUMMARY:   HISTORY:    Past Medical History:   Diagnosis Date    Anxiety and depression     Arthritis     CAD (coronary artery disease) 03/2018    CABG, [place]    Chronic pain     LEFT KNEE    CKD (chronic [facility] disease[facility] III [facility]     DM type 2 causing renal disease ([facility]     DM type 2 causing vascular disease ([facility]     Prostate cancer ([facility]     Von Willebrand disease ([facility]      Past Surgical History:   Procedure Laterality Date    HX APPENDECTOMY  1979    HX ARTERIAL BYPASS      HX CATARACT REMOVAL Bilateral 2016, 2011    HX GI  2017    COLONOSCOPY WITH NO POLYPS    HX HEENT  1949    T&A    HX HEENT  1972    EXTRACTION OF WISDOM TEETH X 4 (2 SURGERIES 2 TEETH AT A TIME)    HX ORTHOPAEDIC  02/2006    RIGHT HAMMER TOES X 4 FIXED    HX OTHER SURGICAL  1963    RIGHT INNER THIGH CEBACIOUS CYST REMOVED    HX PROSTATECTOMY  06/2012    RADICAL PROSTATECTOMY    VT ABDOMEN SURGERY PROC UNLISTED  1954    RIGHT CONGENITAL INGUINAL HERNIA REPAIR    VT ABDOMEN SURGERY PROC UNLISTED  1991    LEFT INGUINAL HERNIA REPAIR    VT ABDOMEN SURGERY PROC UNLISTED      SIGMOIDOSCOPY-FOR HEMMORIODS.     VT CABG, ARTERY-VEIN, FOUR  03/2018    CABG    VT TOTAL HIP ARTHROPLASTY Right 2017       Personal factors and/or comorbidities impacting plan of care: as above    Home Situation  Home Environment: Private residence  # Steps to Enter: 7 (3 with 1 rail + 4 with bilateral rails )  One/Two Story Residence: One story  Living Alone: Yes  Support Systems: Friend/Neighbor  Patient Expects to be Discharged to[de-identified] [employer]  Current DME Used/Available at Home: [place], straight, Walker, rolling, Walker, rollator  Tub or Shower Type: Tub/Shower combination    EXAMINATION/PRESENTATION/DECISION MAKING:   Critical Behavior:  Neurologic State: Alert  Orientation Level: Oriented X4  Cognition: Appropriate decision making  Safety/Judgement: Decreased awareness of need for assistance, Decreased insight into deficits  Hearing: Auditory  Auditory Impairment: None  Skin:  RLE dressing intact, no visible drainage    Range Of Motion:      AROM WFL LEs except N/T R foot/ankle                    Strength:           Generally decreased throughout              Tone & Sensation:             Normal LE tone; LE sensation impaired B feet                     Coordination:   WFL  Vision:    Functional Mobility:  Bed Mobility:     Supine to Sit: Additional time;Minimum assistance; Adaptive equipment;Bed Modified; unable to perform without physical assistance despite encouragement. States he is able to perform independently at baseline. Scooting: Additional time;Contact guard assistance  Transfers:  Sit to Stand:  (unable while maintaining NWB)                       Other: mod assist lateral transfer bed to drop arm chair   Cues for NWB RLE, cues for RLE placement/positioning  Balance:   Sitting: Intact  Ambulation/Gait Training:               Pt unable     Right Side Weight Bearing: Non-weight bearing               Demonstrated proper transfer and gait techniques while NWB RLE. Pt with decreased attention and diverting education to other topics                                 Functional Measure:  Barthel Index:    Bathin  Bladder: 5  Bowels: 10  Groomin  Dressin  Feeding: 10  Mobility: 0  Stairs: 0  Toilet Use: 5  Transfer (Bed to Chair and Back): 5  Total: 45/100       The Barthel ADL Index: Guidelines  1. The index should be used as a record of what a patient does, not as a record of what a patient could do. 2. The main aim is to establish degree of independence from any help, physical or verbal, however minor and for whatever reason. 3. The need for supervision renders the patient not independent. 4. A patient's performance should be established using the best available evidence.  Asking the patient, friends/relatives and nurses are the usual sources, but direct observation and common sense are also important. However direct testing is not needed. 5. Usually the patient's performance over the preceding 24-48 hours is important, but occasionally longer periods will be relevant. 6. Middle categories imply that the patient supplies over 50 per cent of the effort. 7. Use of aids to be independent is allowed. Mirta Viera., Barthel, D.W. (3050). Functional evaluation: the Barthel Index. [address] [facility] (14)2. JULIET Hand, Celio Orlando., Sonia Roa., Luzma, [address] (1999). Measuring the change indisability after inpatient rehabilitation; comparison of the responsiveness of the Barthel Index and Functional [place]arnett Measure. Journal of Neurology, Neurosurgery, and Psychiatry, 66(4), 114-916. Tevin Mckay, NZeenatJ.A, WAYNE Yeung, & Emilie Mathis, M.A. (2004.) Assessment of post-stroke quality of life in cost-effectiveness studies: The usefulness of the Barthel Index and the EuroQoL-5D.  Quality of Life Research, 15, 175-40            Physical Therapy Evaluation Charge Determination   History Examination Presentation Decision-Making   HIGH Complexity :3+ comorbidities / personal factors will impact the outcome/ POC  HIGH Complexity : 4+ Standardized tests and measures addressing body structure, function, activity limitation and / or participation in recreation  medium Complexity : Unstable and unpredictable characteristics  Other outcome measures barthel  MEDIUM      Based on the above components, the patient evaluation is determined to be of the following complexity level: MEDIUM    Pain Rating:  Denies pain    Activity Tolerance:   requires rest breaks    After treatment patient left in no apparent distress:   Sitting in chair, Call bell within reach, Bed / chair alarm activated to be activated by PCT in room, and RLE elevated    Pt declining multiple PT attempts at re-connecting urine management system, lower body hygiene, gown, and linens change after pt mobility. He repeatedly states he prefers nursing assistance for these tasks. RN notified immediately after PT evaluation and PCT entering room on PT exit. COMMUNICATION/EDUCATION:   The patients plan of care was discussed with: Occupational therapist and Registered nurse. Fall prevention education was provided and the patient/caregiver indicated understanding., Patient/family have participated as able in goal setting and plan of care. , and Patient/family agree to work toward stated goals and plan of care.     Thank you for this referral.  Walter Quintero, PT, DPT   Time Calculation: 52 mins

## 2021-11-02 NOTE — OP NOTES
Operative Report    Patient: Karla  MRN: [id number]  SSN: [id number]    YOB: 1950  Age: 66 y.o. Sex: male       Date of Surgery: 11/1/2021     Preoperative Diagnosis: Gangrene     Postoperative Diagnosis: Gangrene     Surgeon(s) and Role:     * Tanmay Rosa DPM - Primary    Anesthesia: MAC     Procedure:   GREAT TOE AND FIRST METATARSAL AMPUTATION  RIGHT FOOT    Justification of Procedure: Patient is a 67-year-old male diabetic who presented to the hospital with long-standing ulcer to the right foot which recently became infected causing additional sentencing wounds with aristeo pus draining as well as cellulitis. I was consulate for evaluation. MRI was ordered and obtained showing osteomyelitis of the hallux as well as the first metatarsal head. There was also noted abscesses surrounding the first metatarsophalangeal joint. Due to these findings I recommended surgical intervention to decompress the infection with and amputation of the first metatarsal and toe as part of a staged limb salvage attempt. I discussed the procedure at length including the expected postoperative course, risks, alternatives, and possible complications. No guarantees were given and the patient elected to proceed. Procedure in Detail: This 67-year-old male was brought the operating room and placed supine on the operating room table. After adequate IV sedation the right lower extremity was prepped and draped in usual aseptic fashion. A local infiltrated block was performed to the first ray using 0.5% Marcaine plane. Next attention was directed to the right foot where there was a noted ulcer sub first metatarsophalangeal joint with aristeo pus as well as surrounding erythema and edema with sinus thing wounds into the first intermetatarsal space.  Incision was made encircling the first metatarsophalangeal joint distal lateral to proximal medial and extending medially along the foot as well as plantarly to encompass the plantar ulcer. Incision was deepened to expose first metatarsophalangeal joint which was then incised detaching the toe from the foot and passing the toe from the field for pathology. Next soft tissue attachments were dissected free from the first metatarsal head which was then resected obliquely. Devitalized tissue especially the flexor hallucis the and extensor hallucis longus tendons were removed due to purulence within the tendon sheaves. There is also Poss noted to the first intermetatarsal space. All of this was evacuated and irrigated with copious saline solution. All devitalized tissue was removed via debridement until healthy bleeding was encountered and all quadrants. The wound was then packed with a Dakin's wet to dry dressing. Patient tolerated the procedure and anesthesia well and was transferred to the PACU for postoperative monitoring. From there he will be transferred to the floor for further medical and surgical management. This is a staged procedure and will require additional procedures for limb salvage. Estimated Blood Loss:  20mL    Tourniquet Time:   Total Tourniquet Time Documented:  Calf (Right) - 22 minutes  Total: Calf (Right) - 22 minutes        Implants: * No implants in log *            Specimens:   ID Type Source Tests Collected by Time Destination   1 : Right Great Toe  Preservative Toe  Shaneka Cheng 11/1/2021 2027 Pathology   2 : Right First Metatarsal Preservative Toe  Miguel Fitzpatrick DPM 11/1/2021 2038 Pathology   1 : Abcess Right Foot Body Fluid Foot, Right AEROBIC/ANAEROBIC CULTURE Jagdeep Rosa DPM 11/1/2021 2028 Microbiology           Drains: None                Complications: None    Counts: Sponge and needle counts were correct times two.     Signed By:  Humberto Yost DPM     November 1, 2021

## 2021-11-02 NOTE — PROGRESS NOTES
The [address] [place] [address] Podiatric Surgery  Post-Op Note    Subjective:   Admit Date: 10/31/2021  OR Date: 11/1/2021  Post-op: 1 Day Post-Op  Status Post:   Right foot first ray amputation  Right foot I&D, multiple subfascial spaces    Gejose Villatoro  appears; alert, well appearing, and in no distress  Pain control is: excellent    Objective[de-identified]    height is 5' 11\" (1.803 m) and weight is 104.3 kg (230 lb). His temperature is 99.8 °F (37.7 °C). His blood pressure is 151/77 (abnormal) and his pulse is 97. His respiration is 18 and oxygen saturation is 95%. Intake/Output Summary (Last 24 hours) at 11/2/2021 1220  Last data filed at 11/2/2021 0845  Gross per 24 hour   Intake 8694 ml   Output 900 ml   Net 7794 ml       Physical Exam:  Incision: open wound with exposed bone, retention sutures right foot first ray status post open amputation. No purulence.  Increasing granulation tissue  Abdomen: soft, nontender, nondistended, no masses or organomegaly  DVT Exam:No evidence of DVT seen on physical exam.    Labs:  Lab Results   Component Value Date/Time    WBC 9.3 11/02/2021 02:05 AM    HGB 10.2 (L) 11/02/2021 02:05 AM    HCT 31.6 (L) 11/02/2021 02:05 AM    MCV 97.2 11/02/2021 02:05 AM    PLATELET 629 36/81/5645 02:05 AM     Lab Results   Component Value Date/Time    Sodium 134 (L) 11/02/2021 02:05 AM    Potassium 5.0 11/02/2021 02:05 AM    Chloride 104 11/02/2021 02:05 AM    CO2 25 11/02/2021 02:05 AM    Phosphorus 2.7 11/02/2021 02:05 AM    BUN 13 11/02/2021 02:05 AM    Calcium 8.0 (L) 11/02/2021 02:05 AM      Lab Results   Component Value Date/Time    Glucose 231 (H) 11/02/2021 02:05 AM       Impression/Plan:   Principal Problem:    Diabetic foot ulcer ([address] (10/31/2021)    Active Problems:    Arthritis ()      CAD (coronary artery disease) (3/2018)      Overview: CABG, [place]      DM type 2 causing vascular disease ([facility] ()      Prostate cancer ([facility] ()      Von Willebrand disease ([facility] ()      Anxiety and depression ()      Chronic pain ()      Overview: LEFT KNEE      Hyponatremia (11/1/2021)      CKD (chronic kidney disease), stage III (HCC) ()      DM type 2 causing renal disease (HCC) ()      1.  Status post right 1st ray amputation  2. Status post right foot I&D  3. Open wound with exposed bone right foot    Plan:  · Pathology pending. Compression dressing applied. Antibiotics per ID. Cedrick Anne.  JOSTIN Rosa FACFAS FACCWS [facility]  Triple Board Certified Foot & Ankle Specialist  [phone] cell  November 2, 2021  12:20 PM

## 2021-11-02 NOTE — CONSULTS
Infectious Disease Consult    Impression/Plan   · Right diabetic foot infection with osteomyelitis, abscess, and gangrene. Status post I&D with first metatarsal and great toe amputation 11/1/2021. Surgical cultures and pathology pending. Continue current antibiotics. Discharge antibiotic plan to be determined  · Diabetes mellitus. Hemoglobin A1c 8.2  · CKD. Creatinine stable  · History of von Willebrand's disease        Anti-infectives:   1. Vancomycin  2. Cefepime  3. Metronidazole    Subjective:   Date of Consultation:  November 2, 2021  Date of Admission: 10/31/2021   Referring Physician:     Kristin Viera is a 66 y.o. male PMH DM with neuropathy, CABG, Von Willebrand, THR/TKR, and right foot 2-5 claw toe corrections by podiatry who c/o right foot infection for 2 weeks. He reports intermittent ulceration plantar 1st metatarsal and cellulitis that normally responds to Augmentin. He noted erythema, edema, and wound 2 weeks ago with no improvement on Bactrim from PCP. Intermittent fevers prior to admission. Work-up has revealed diabetic foot infection with abscess, gangrene, and osteomyelitis. He was taken to the OR on 11/1 where he underwent I&D with amputation. Surgical cultures are pending. He is on vancomycin, cefepime, and metronidazole. The infectious diseases service has been asked to assist with antibiotic management          Patient Active Problem List   Diagnosis Code    Diabetic foot ulcer ([facility] E11.621, L97.509    Arthritis M19.90    CAD (coronary artery disease) I25.10    DM type 2 causing vascular disease ([facility] E11.59    Prostate cancer ([facility] C61    Von Willebrand disease ([facility] D68.0    Anxiety and depression F41.9, F32. A    Chronic pain G89.29    Hyponatremia E87.1    CKD (chronic kidney disease), stage III (HCC) N18.30    DM type 2 causing renal disease (HCC) E11.29     Past Medical History:   Diagnosis Date    Anxiety and depression     Arthritis     CAD (coronary artery disease) 03/2018    [place]    Chronic pain     LEFT KNEE    CKD (chronic kidney disease), stage III (HCC)     DM type 2 causing renal disease ([facility]     DM type 2 causing vascular disease ([facility]     Prostate cancer ([facility]     Von Willebrand disease ([facility]       Family History   Problem Relation Age of Onset    Cancer Mother         ovarian?  Cancer Father         PROSTATE CA, & NON MALIGNANT TUMOR SURGERY THAT HE DID NOT RECOVER FROM & HAD RENAL SURGERY    Other Father         BRAIN TUMOR    Kidney Disease Father         RENAL FAILURE    No Known Problems Sister     No Known Problems Brother     No Known Problems Daughter     No Known Problems Son     Anesth Problems Neg Hx       Social History     Tobacco Use    Smoking status: Never Smoker    Smokeless tobacco: Never Used    Tobacco comment: h/o PIPE SMOKING IN 1970's, STOPPED   Substance Use Topics    Alcohol use: Yes     Comment: RARE     Past Surgical History:   Procedure Laterality Date    HX APPENDECTOMY  1979    HX ARTERIAL BYPASS      HX CATARACT REMOVAL Bilateral 2016, 2011    HX GI  2017    COLONOSCOPY WITH NO POLYPS    HX HEENT  1949    T&A    HX HEENT  1972    EXTRACTION OF WISDOM TEETH X 4 (2 SURGERIES 2 TEETH AT A TIME)    HX ORTHOPAEDIC  02/2006    RIGHT HAMMER TOES X 4 FIXED    HX OTHER SURGICAL  1963    RIGHT INNER THIGH CEBACIOUS CYST REMOVED    HX PROSTATECTOMY  06/2012    RADICAL PROSTATECTOMY    OR ABDOMEN SURGERY PROC UNLISTED  1954    RIGHT CONGENITAL INGUINAL HERNIA REPAIR    OR ABDOMEN SURGERY PROC UNLISTED  1991    LEFT INGUINAL HERNIA REPAIR    OR ABDOMEN SURGERY [address] UNLISTED      SIGMOIDOSCOPY-FOR HEMMORIODS.  OR CABG, ARTERY-VEIN, FOUR  03/2018    CABG    OR TOTAL HIP ARTHROPLASTY Right 2017      Prior to Admission medications    Medication Sig Start Date End Date Taking?  Authorizing Provider   trimethoprim-sulfamethoxazole (Bactrim DS) 160-800 mg per tablet Take 1 Tablet by mouth two (2) times a day.   Yes Provider, Historical   insulin glargine-lixisenatide (Soliqua 100/33) 100 unit-33 mcg/mL inpn 18 Units by SubCUTAneous route nightly. Yes Provider, Historical   pioglitazone (ACTOS) 15 mg tablet Take 15 mg by mouth daily. Yes Provider, Historical   aspirin delayed-release 81 mg tablet Take 81 mg by mouth daily. Yes Provider, Historical   diazePAM (VALIUM) 5 mg tablet Take 5 mg by mouth as needed for Anxiety. Yes Provider, Historical   simvastatin (ZOCOR) 40 mg tablet Take 40 mg by mouth nightly. Yes Provider, Historical   metFORMIN (GLUCOPHAGE) 500 mg tablet Take 500 mg by mouth two (2) times daily (with meals). Yes Other, MD Daryl   irbesartan (AVAPRO) 150 mg tablet Take 150 mg by mouth daily. IN AM   Yes Other, MD Daryl     Allergies   Allergen Reactions    Ddavp [Desmopressin] Other (comments)     KIDNEY ISSUES. Review of Systems:  A comprehensive review of systems was negative except for that written in the History of Present Illness. Objective:   Blood pressure (!) 151/77, pulse 97, temperature 99.8 °F (37.7 °C), resp. rate 18, height 5' 11\" (1.803 m), weight 230 lb (104.3 kg), SpO2 95 %.   Temp (24hrs), Av °F (37.2 °C), Min:97.5 °F (36.4 °C), Max:100.9 °F (38.3 °C)       Exam:    General:  Alert, cooperative, well noursished, well developed, appears stated age   Eyes:  Sclera anicteric   Mouth/Throat: Mucous membranes normal   Neck: Supple   Lungs:    No distress   CV:     Abdomen:    non-distended   Extremities:  Foot dressed   Skin:    Lymph nodes:    Musculoskeletal:  Moves all   Lines/Devices:   Peripheral   Psych: Alert and oriented, normal mood affect given the setting       Data Review:   Recent Results (from the past 24 hour(s))   GLUCOSE, POC    Collection Time: 21 11:45 AM   Result Value Ref Range    Glucose (POC) 177 (H) 65 - 117 mg/dL    Performed by Nohemi Rivera    COVID-19 RAPID TEST    Collection Time: 21  2:56 PM   Result Value Ref Range Specimen source Nasopharyngeal      COVID-19 rapid test Not detected NOTD     VON WILLEBRAND PANEL    Collection Time: 11/01/21  4:23 PM   Result Value Ref Range    Factor VIII Activity PENDING %    von Willebrand Factor (vWF) Ag PENDING %    vWF Activity PENDING %   GLUCOSE, POC    Collection Time: 11/01/21  5:47 PM   Result Value Ref Range    Glucose (POC) 152 (H) 65 - 117 mg/dL    Performed by [id number] [facility], POC    Collection Time: 11/01/21  7:22 PM   Result Value Ref Range    Glucose (POC) 151 (H) 65 - 117 mg/dL    Performed by [address], BODY FLUID Bhavani Sprout STAIN    Collection Time: 11/01/21  8:28 PM    Specimen: Foot    ABCESS RIGHT FOOT   Result Value Ref Range    Special Requests: NO SPECIAL REQUESTS      GRAM STAIN OCCASIONAL WBCS SEEN      GRAM STAIN NO ORGANISMS SEEN      Culture result: PENDING    GLUCOSE, POC    Collection Time: 11/01/21  9:08 PM   Result Value Ref Range    Glucose (POC) 153 (H) 65 - 117 mg/dL    Performed by [address], COMPREHENSIVE    Collection Time: 11/02/21  2:05 AM   Result Value Ref Range    Sodium 134 (L) 136 - 145 mmol/L    Potassium 5.0 3.5 - 5.1 mmol/L    Chloride 104 97 - 108 mmol/L    CO2 25 21 - 32 mmol/L    Anion gap 5 5 - 15 mmol/L    Glucose 231 (H) 65 - 100 mg/dL    BUN 13 6 - 20 MG/DL    Creatinine 0.87 0.70 - 1.30 MG/DL    BUN/Creatinine ratio 15 12 - 20      GFR est AA >60 >60 ml/min/1.73m2    GFR est non-AA >60 >60 ml/min/1.73m2    Calcium 8.0 (L) 8.5 - 10.1 MG/DL    Bilirubin, total 0.5 0.2 - 1.0 MG/DL    ALT (SGPT) 43 12 - 78 U/L    AST (SGOT) 29 15 - 37 U/L    Alk.  phosphatase 121 (H) 45 - 117 U/L    Protein, total 6.3 (L) 6.4 - 8.2 g/dL    Albumin 2.1 (L) 3.5 - 5.0 g/dL    Globulin 4.2 (H) 2.0 - 4.0 g/dL    A-G Ratio 0.5 (L) 1.1 - 2.2     MAGNESIUM    Collection Time: 11/02/21  2:05 AM   Result Value Ref Range    Magnesium 1.8 1.6 - 2.4 mg/dL   CBC WITH AUTOMATED DIFF    Collection Time: 11/02/21  2:05 AM Result Value Ref Range    WBC 9.3 4.1 - 11.1 K/uL    RBC 3.25 (L) 4.10 - 5.70 M/uL    HGB 10.2 (L) 12.1 - 17.0 g/dL    HCT 31.6 (L) 36.6 - 50.3 %    MCV 97.2 80.0 - 99.0 FL    MCH 31.4 26.0 - 34.0 PG    MCHC 32.3 30.0 - 36.5 g/dL    RDW 13.5 11.5 - 14.5 %    PLATELET 227 592 - 853 K/uL    MPV 10.3 8.9 - 12.9 FL    NRBC 0.0 0  WBC    ABSOLUTE NRBC 0.00 0.00 - 0.01 K/uL    NEUTROPHILS 70 32 - 75 %    LYMPHOCYTES 15 12 - 49 %    MONOCYTES 11 5 - 13 %    EOSINOPHILS 3 0 - 7 %    BASOPHILS 1 0 - 1 %    IMMATURE GRANULOCYTES 2 (H) 0.0 - 0.5 %    ABS. NEUTROPHILS 6.5 1.8 - 8.0 K/UL    ABS. LYMPHOCYTES 1.3 0.8 - 3.5 K/UL    ABS. MONOCYTES 1.0 0.0 - 1.0 K/UL    ABS. EOSINOPHILS 0.2 0.0 - 0.4 K/UL    ABS. BASOPHILS 0.1 0.0 - 0.1 K/UL    ABS. IMM.  GRANS. 0.2 (H) 0.00 - 0.04 K/UL    DF AUTOMATED     PHOSPHORUS    Collection Time: 11/02/21  2:05 AM   Result Value Ref Range    Phosphorus 2.7 2.6 - 4.7 MG/DL   GLUCOSE, POC    Collection Time: 11/02/21  6:26 AM   Result Value Ref Range    Glucose (POC) 241 (H) 65 - 117 mg/dL    Performed by [address] [place] [address], POC    Collection Time: 11/02/21 11:30 AM   Result Value Ref Range    Glucose (POC) 210 (H) 65 - 117 mg/dL    Performed by Wiliam Arnett         Microbiology:     Studies:      Signed By: Dannielle Bullard DO     November 2, 2021

## 2021-11-02 NOTE — PROGRESS NOTES
[facility] Dosing Services: Antimicrobial Stewardship Daily Doc    Consult for antibiotic dosing of Vancomycin by Dr. Pasquale Lucero  Indication: Diabetic foot ulcer, tenosynovitis, osteomyelitis (resistant Augmentin, failed Bactrim outpt)   Day of Therapy: 2    11/1 - I&D R foot, great toe and first metatarsal amputation    Ht Readings from Last 1 Encounters:   10/31/21 180.3 cm (71\")        Wt Readings from Last 1 Encounters:   10/31/21 104.3 kg (230 lb)        Vancomycin therapy:  Current maintenance dose: vancomycin 1250 mg IV every 24 hours   Last level: N/A   Dose calculated to approximate a           a. Target AUC/NATHAN of 400-600          b. Trough of N/A     Plan: Change regimen to vancomycin 1000 mg IV every 12 hours for a predicted AUC of 467 due to significantly improved renal function. Scr trending down and is at baseline, urine output adequate, will continue to monitor closely. WBC increased but WNL, febrile 100.9 F on 11/2. Will order a trough level on 11/3 prior to 1100 dose to confirm therapeutic dosing. Dose administration notes:   Doses given appropriately as scheduled. Date Dose & Interval Measured (mcg/mL) Extrapolated (mcg/mL)   ? ? ? ?   ? ? ? ?   ? ? ? ? Other Antimicrobial   (not dosed by pharmacist) Cefepime 2 g IV every 8 hours  Metronidazole 500 mg IV every 12 hours    Cultures 10/31 Blood: NG x2 days (prelim)  11/1 Wound (foot): light possible enterococcus (prelim)  11/1 Body fluid (abscess): pending  11/1 anaerobic: pending    10/22 Wound (PTA): klebsiella pneumoniae, enterobacter cloacae; resistant Augmentin   Serum Creatinine Lab Results   Component Value Date/Time    Creatinine 0.87 11/02/2021 02:05 AM    Creatinine (POC) 1.0 05/24/2019 12:45 AM         Creatinine Clearance Estimated Creatinine Clearance: 92.9 mL/min (based on SCr of 0.87 mg/dL).      Temp Temp: 99.1 °F (37.3 °C)       WBC Lab Results   Component Value Date/Time    WBC 9.3 11/02/2021 02:05 AM Procalcitonin Lab Results   Component Value Date/Time    Procalcitonin 0.11 10/31/2021 09:43 PM        For Antifungals, Metronidazole and Nafcillin: Lab Results   Component Value Date/Time    ALT (SGPT) 43 11/02/2021 02:05 AM    AST (SGOT) 29 11/02/2021 02:05 AM    Alk.  phosphatase 121 (H) 11/02/2021 02:05 AM    Bilirubin, total 0.5 11/02/2021 02:05 AM        Pharmacist Urbano Barber, ZOEYD

## 2021-11-02 NOTE — ANESTHESIA POSTPROCEDURE EVALUATION
Procedure(s):  INCISION AND DRAINAGE RIGHT FOOT, GREAT TOE AMPUTATION, FIRSTMETATARSAL AMPUTATION.     MAC    Anesthesia Post Evaluation      Multimodal analgesia: multimodal analgesia not used between 6 hours prior to anesthesia start to PACU discharge  Patient location during evaluation: PACU  Patient participation: complete - patient participated  Level of consciousness: awake  Pain management: adequate  Airway patency: patent  Anesthetic complications: no  Cardiovascular status: acceptable, blood pressure returned to baseline and hemodynamically stable  Respiratory status: acceptable  Hydration status: acceptable  Post anesthesia nausea and vomiting:  controlled  Final Post Anesthesia Temperature Assessment:  Normothermia (36.0-37.5 degrees C)      INITIAL Post-op Vital signs:   Vitals Value Taken Time   /54 11/01/21 2058   Temp 36.7 °C (98 °F) 11/01/21 2058   Pulse 97 11/01/21 2058   Resp 18 11/01/21 2058   SpO2 97 % 11/01/21 2058

## 2021-11-02 NOTE — PROGRESS NOTES
Problem: Self Care Deficits Care Plan (Adult)  Goal: *Acute Goals and Plan of Care (Insert Text)  Description:   FUNCTIONAL STATUS PRIOR TO ADMISSION: Patient was modified independent using a cane for community mobility and no device for household mobility. Drives private vehicle. HOME SUPPORT PRIOR TO ADMISSION: The patient lived alone with neighbors to provide assistance. Occupational Therapy Goals  Initiated 11/2/2021  1. Patient will perform lower body dressing with minimal assistance/contact guard assist within 7 day(s). 2.  Patient will perform bathing task with minimal assistance/contact guard assist within 7 day(s). 3.  Patient will perform toilet transfers with minimal assistance/contact guard assist within 7 day(s). 4.  Patient will perform all aspects of toileting with minimal assistance/contact guard assist within 7 day(s). 5.  Patient will participate in upper extremity therapeutic exercise/activities with supervision/set-up for 10 minutes within 7 day(s). Outcome: Progressing Towards Goal   OCCUPATIONAL THERAPY  EVALUATION  Patient: Efe Jones (51 y.o. male)  Date: 11/2/2021  Diagnosis: Diabetic foot ulcer ([facility] [E11.621, L97.509] Diabetic foot ulcer ([facility]  Procedure(s) (LRB):  INCISION AND DRAINAGE RIGHT FOOT, GREAT TOE AMPUTATION, FIRSTMETATARSAL AMPUTATION (Right) 1 Day Post-Op  Precautions: NWB, Fall (RLE NWB)  Chart, occupational therapy assessment, plan of care, and goals were reviewed. ASSESSMENT  Patient continues with skilled OT services and is progressing towards goals. Patient received seated in chair and reports frustration. Patient agreeable to assistance, but apprehensive regarding OT evaluation. Patient report multiple orthopedic procedures and has \" done OT many times\". Patient is agreeable to assist for hygiene secondary to urine to chair and floor. Patient reports pain 8/10 to RLE.   Patient requires moderate assistance for hygiene in sitting, and dons new gown with min assist secondary to IV lines and telemetry. Patient set up for transfer to bed. Chair arm lowered for scooting. Patient able to perform with mod assist. Patient aware of RLE NWB status and patient does well with maintaining during scooting. Patient left in NAD in bed. Current Level of Function Impacting Discharge (ADLs): mod assist    Other factors to consider for discharge: lives alone         PLAN :  Patient continues to benefit from skilled intervention to address the above impairments. Continue treatment per established plan of care to address goals. Recommend with staff: OOB to chair, BSC transfers     Recommend next OT session: continue goals     Recommendation for discharge: (in order for the patient to meet his/her long term goals)  Therapy up to 5 days/week in SNF setting    This discharge recommendation:  Has not yet been discussed the attending provider and/or case management    IF patient discharges home will need the following DME: TBD       SUBJECTIVE:   Patient stated Im am so frustrated right now.     OBJECTIVE DATA SUMMARY:   Cognitive/Behavioral Status:  Neurologic State: Alert  Orientation Level: Oriented X4  Cognition: Follows commands  Perception: Appears intact  Perseveration: No perseveration noted  Safety/Judgement: Decreased awareness of need for assistance;Decreased insight into deficits    Functional Mobility and Transfers for ADLs:  Bed Mobility:  Supine to Sit: Additional time;Minimum assistance; Adaptive equipment;Bed Modified  Scooting: Additional time;Contact guard assistance    Transfers:  Sit to Stand:  (unable while maintaining NWB)  Functional Transfers  Toilet Transfer : Moderate assistance; Additional time (inferred from chair/bed)       Balance:  Sitting: Intact  Standing:  (not tested unable to maintain NWB with PT )    ADL Intervention:                                     Cognitive Retraining  Safety/Judgement: Decreased awareness of need for assistance;Decreased insight into deficits    Therapeutic Exercises:       Pain:  Patient reports pain at 8/10    Activity Tolerance:   Good and requires rest breaks    After treatment patient left in no apparent distress:   Supine in bed, Call bell within reach, and Side rails x 3    COMMUNICATION/COLLABORATION:   The patients plan of care was discussed with: Physical therapist and Registered nurse.      Mercedes Carlson, OTR/L  Time Calculation: 46 mins

## 2021-11-02 NOTE — PERIOP NOTES
TRANSFER - OUT REPORT:    Verbal report given to Chantal VILA(name) on Courtney Diaz  being transferred to [facility](unit) for routine post - op       Report consisted of patients Situation, Background, Assessment and   Recommendations(SBAR). Information from the following report(s) SBAR, Kardex, Intake/Output, MAR, Recent Results and Cardiac Rhythm NSR was reviewed with the receiving nurse. Lines:   Peripheral IV 11/01/21 Right Wrist (Active)   Site Assessment Clean, dry, & intact; Clean;Dry 11/01/21 2137   Phlebitis Assessment 0 11/01/21 2137   Infiltration Assessment 0 11/01/21 2137   Dressing Status Clean, dry, & intact 11/01/21 2137   Dressing Type Transparent 11/01/21 2137   Hub Color/Line Status Pink 11/01/21 2137   Action Taken Open ports on tubing capped 11/01/21 0056   Alcohol Cap Used Yes 11/01/21 2137        Opportunity for questions and clarification was provided.       Patient transported with:   O2 @ 2 liters  Registered Nurse

## 2021-11-02 NOTE — PROGRESS NOTES
Bedside and Verbal shift change report given to Tia (oncoming nurse) by Rosana Richardson (offgoing nurse). Report included the following information SBAR, Kardex and MAR.

## 2021-11-02 NOTE — PROGRESS NOTES
11/2/2021   CARE MANAGEMENT NOTE:  CM reviewed EMR and handoff received from previous  Stefanie Shields). Pt was admitted with right diabetic foot infection with osteo, abscess, and gangrene. Reportedly, pt resides alone in a ground floor apt located in [place]. Jeanie Hamlin (c [phone]) is the decision maker. RUR 9%    Transition Plan of Care:  1. Ortho, ID, Podiatry following for medical management - pt is s/p I&D of right foot and great toe amputation, first metatarsal amp on 11/1.  2.  Plan is for pt to return home on IV ABX (orders pending)  3. Outpt f/u  4. Pt will arrange his own transportation home    CM will continue to follow pt until discharged.   Bere

## 2021-11-02 NOTE — BRIEF OP NOTE
Brief Postoperative Note    Patient: Cristian Toro  YOB: 1950  MRN: [id number]    Date of Procedure: 11/1/2021     Pre-Op Diagnosis:   1. Abscess right foot  2. Gangrene right foot    Post-Op Diagnosis: Same as preoperative diagnosis. Procedure(s):  INCISION AND DRAINAGE RIGHT FOOT,   GREAT TOE AMPUTATION, FIRST METATARSAL AMPUTATION    Surgeon(s):  Rome Rosa DPM    Surgical Assistant: Surg Arikt-1: Amanuel Current    Anesthesia: MAC     Estimated Blood Loss (mL): 20    Complications: None    Specimens:   ID Type Source Tests Collected by Time Destination   1 : Right Great Toe  Preservative Toe  Zuleyma Aguilar [facility] 11/1/2021 2027 Pathology   2 : Right First Metatarsal Preservative Toe  Zuleyma Aguilar DPM 11/1/2021 2038 Pathology   1 : Abcess Right Foot Body Fluid Foot, Right AEROBIC/ANAEROBIC CULTURE Rome Rosa DPM 11/1/2021 2028 Microbiology        Implants: * No implants in log *    Drains: * No LDAs found *    Findings: liquefactive necrosis right hallux and plantar first mtpj with soft sesamoids FHL tenosynovitis with rupture. Purulence noted to the first intermetatarsal space and flexor tendon sheath.  Gas gangrene lateral right hallux    Electronically Signed by Tru Sauceda DPM on 11/1/2021 at 9:01 PM

## 2021-11-02 NOTE — PROGRESS NOTES
[facility]  [address] [facility], [place], [address] [place] [address]  [phone]      Medical Progress Note      NAME: Arabella Ramirez   :  1950  MRM:  [id number]    Date/Time: 2021        Assessment / Plan:     67 yo M w/ hx of CAD, DM, von Williebrand disease, prostate CA presenting with R foot wound, found to have extensive DFI w/ osteomyelitis     Diabetic foot ulcer: osteomyelitis of the tibial hallux sesamoid. Gas forming cellulitis in the great toe. Plantar soft tissue ulceration has sinus tract from the ulcer to the tibial hallux sesamoid. Complete rupture of the FHL tendon, which may be secondarily infected. S/p I&D and great toe / first metatarsal amputation. Follow wound cultures. Abx changed to IV vanc and cefepime and Flagyl. ID following     DM type 2 causing renal, vascular and neurological disease: A1c 8.2. Poorly controlled. Start Lantus, 15 units QHS for now. SSI     CAD (coronary artery disease) / Hypertension: BP controlled. Resume ARB in AM. Resume statin. ASA on hold     Von Willebrand disease: Monitor for bleeding. Hold ASA for now     JAMES: POA. Mild, Cr normalized. Resume ARB     Chronic pain / Arthritis: APAP PRN     Anxiety and depression:  Valium PRN     Hx of prostate cancer: Outpatient follow up      Total time spent: 35 minutes  Time spent in the care of this patient including reviewing records, discussing with nursing and/or other providers on the treatment team, obtaining history and examining the patient, and discussing treatment plans. Care Plan discussed with: Patient, Nursing Staff and >50% of time spent in counseling and coordination of care    Discussed:  Care Plan and D/C Planning    Prophylaxis:  Lovenox    Disposition:  Home w/Family vs SNF         Subjective:     Chief Complaint:  Follow up osteo    Chart/notes/labs/studies reviewed, patient examined. Feels okay. No pain in foot.  No fever              Objective: Vitals:        Last 24hrs VS reviewed since prior progress note. Most recent are:    Visit Vitals  /73 (BP 1 Location: Left upper arm, BP Patient Position: At rest)   Pulse 84   Temp 99.2 °F (37.3 °C)   Resp 20   Ht 5' 11\" (1.803 m)   Wt 104.3 kg (230 lb)   SpO2 95%   BMI 32.08 kg/m²     SpO2 Readings from Last 6 Encounters:   11/02/21 95%   08/09/19 98%   07/18/19 100%   05/24/19 97%   01/08/19 98%   10/31/17 95%    O2 Flow Rate (L/min): 2 l/min       Intake/Output Summary (Last 24 hours) at 11/2/2021 1915  Last data filed at 11/2/2021 1819  Gross per 24 hour   Intake 684 ml   Output 750 ml   Net -66 ml          Exam:     Physical Exam:    Gen:  Chronically ill-appearing. NAD  HEENT:  Atraumatic, normocephalic. Sclerae nonicteric, hearing intact to voice  Neck:  Supple, without apparent masses. Resp:  No accessory muscle use, CTAB without wheezes, rales, or rhonchi  Card: RRR, without m/r/g. No LE edema  Abd:  +bowel sounds, soft, NTTP, nondistended. No HSM  Neuro: Face symmetric, speech fluent, follows commands appropriately, no focal weakness or numbness  Psych:  Alert, oriented x 3. Good insight  SKIN : R foot dressing C/D/I     Medications Reviewed: (see below)    Lab Data Reviewed: (see below)    ______________________________________________________________________    Medications:     Current Facility-Administered Medications   Medication Dose Route Frequency    L.acidophilus-paracasei-S.thermophil-bifidobacter (RISAQUAD) 8 billion cell capsule  1 Capsule Oral DAILY    vancomycin (VANCOCIN) 1,000 mg in 0.9% sodium chloride 250 mL (VIAL-MATE)  1,000 mg IntraVENous Q12H    [START ON 11/3/2021] Vancomycin Trough - Please draw trough on 11/3 prior to 1100 dose. Thank you!    Other ONCE    cefepime (MAXIPIME) 2 g in sterile water (preservative free) 10 mL IV syringe  2 g IntraVENous Q8H    metroNIDAZOLE (FLAGYL) IVPB premix 500 mg  500 mg IntraVENous Q12H    0.9% sodium chloride infusion  75 mL/hr IntraVENous CONTINUOUS    sodium chloride (NS) flush 5-40 mL  5-40 mL IntraVENous Q8H    sodium chloride (NS) flush 5-40 mL  5-40 mL IntraVENous PRN    acetaminophen (TYLENOL) tablet 650 mg  650 mg Oral Q6H PRN    Or    acetaminophen (TYLENOL) suppository 650 mg  650 mg Rectal Q6H PRN    polyethylene glycol (MIRALAX) packet 17 g  17 g Oral DAILY PRN    ondansetron (ZOFRAN ODT) tablet 4 mg  4 mg Oral Q8H PRN    Or    ondansetron (ZOFRAN) injection 4 mg  4 mg IntraVENous Q6H PRN    [Held by provider] heparin (porcine) injection 5,000 Units  5,000 Units SubCUTAneous Q8H    HYDROmorphone (DILAUDID) injection 1 mg  1 mg IntraVENous Q6H PRN    insulin lispro (HUMALOG) injection   SubCUTAneous AC&HS    glucose chewable tablet 16 g  4 Tablet Oral PRN    dextrose (D50W) injection syrg 12.5-25 g  12.5-25 g IntraVENous PRN    glucagon (GLUCAGEN) injection 1 mg  1 mg IntraMUSCular PRN            Lab Review:     Recent Labs     11/02/21  0205 11/01/21  0148 10/31/21  2143   WBC 9.3 7.1 11.7*   HGB 10.2* 13.6 11.1*   HCT 31.6* 40.9 33.0*    302 250     Recent Labs     11/02/21  0205 11/01/21  0148 10/31/21  2143   * 132* 131*   K 5.0 4.9 5.1    102 100   CO2 25 22 24   * 225* 312*   BUN 13 19 23*   CREA 0.87 1.31* 1.52*   CA 8.0* 8.7 8.5   MG 1.8 1.8  --    PHOS 2.7  --   --    ALB 2.1* 2.4* 2.5*   ALT 43 49 51     No components found for: GLPOC  No results for input(s): PH, PCO2, PO2, HCO3, FIO2 in the last 72 hours. No results for input(s): INR, INREXT in the last 72 hours.   No results found for: SDES  Lab Results   Component Value Date/Time    Culture result: FEW POSSIBLE ENTEROCOCCUS SPECIES (A) 11/01/2021 08:28 PM    Culture result: LIGHT POSSIBLE ENTEROCOCCUS SPECIES (A) 11/01/2021 01:41 AM    Culture result: LIGHT POSSIBLE MIXED SKIN SANG ISOLATED 11/01/2021 01:41 AM              ___________________________________________________    Attending Physician: Michael Bennett MD

## 2021-11-03 LAB
ALBUMIN SERPL-MCNC: 2 G/DL (ref 3.5–5)
ALBUMIN/GLOB SERPL: 0.4 {RATIO} (ref 1.1–2.2)
ALP SERPL-CCNC: 114 U/L (ref 45–117)
ALT SERPL-CCNC: 32 U/L (ref 12–78)
ANION GAP SERPL CALC-SCNC: 7 MMOL/L (ref 5–15)
AST SERPL-CCNC: 16 U/L (ref 15–37)
BASOPHILS # BLD: 0.2 K/UL (ref 0–0.1)
BASOPHILS NFR BLD: 2 % (ref 0–1)
BILIRUB SERPL-MCNC: 0.4 MG/DL (ref 0.2–1)
BUN SERPL-MCNC: 13 MG/DL (ref 6–20)
BUN/CREAT SERPL: 14 (ref 12–20)
CALCIUM SERPL-MCNC: 8 MG/DL (ref 8.5–10.1)
CHLORIDE SERPL-SCNC: 104 MMOL/L (ref 97–108)
CO2 SERPL-SCNC: 22 MMOL/L (ref 21–32)
CREAT SERPL-MCNC: 0.94 MG/DL (ref 0.7–1.3)
DATE LAST DOSE: ABNORMAL
DIFFERENTIAL METHOD BLD: ABNORMAL
EOSINOPHIL # BLD: 0.2 K/UL (ref 0–0.4)
EOSINOPHIL NFR BLD: 3 % (ref 0–7)
ERYTHROCYTE [DISTWIDTH] IN BLOOD BY AUTOMATED COUNT: 13.4 % (ref 11.5–14.5)
FACT VIII ACT/NOR PPP: 346 % (ref 56–140)
GLOBULIN SER CALC-MCNC: 4.6 G/DL (ref 2–4)
GLUCOSE BLD STRIP.AUTO-MCNC: 269 MG/DL (ref 65–117)
GLUCOSE BLD STRIP.AUTO-MCNC: 282 MG/DL (ref 65–117)
GLUCOSE BLD STRIP.AUTO-MCNC: 287 MG/DL (ref 65–117)
GLUCOSE BLD STRIP.AUTO-MCNC: 296 MG/DL (ref 65–117)
GLUCOSE SERPL-MCNC: 266 MG/DL (ref 65–100)
HCT VFR BLD AUTO: 28.7 % (ref 36.6–50.3)
HGB BLD-MCNC: 9.5 G/DL (ref 12.1–17)
IMM GRANULOCYTES # BLD AUTO: 0 K/UL
IMM GRANULOCYTES NFR BLD AUTO: 0 %
INTERPRETATION, 910378, CSIR1: ABNORMAL
LYMPHOCYTES # BLD: 1.4 K/UL (ref 0.8–3.5)
LYMPHOCYTES NFR BLD: 19 % (ref 12–49)
MAGNESIUM SERPL-MCNC: 1.5 MG/DL (ref 1.6–2.4)
MCH RBC QN AUTO: 31.9 PG (ref 26–34)
MCHC RBC AUTO-ENTMCNC: 33.1 G/DL (ref 30–36.5)
MCV RBC AUTO: 96.3 FL (ref 80–99)
MONOCYTES # BLD: 1.3 K/UL (ref 0–1)
MONOCYTES NFR BLD: 17 % (ref 5–13)
NEUTS BAND NFR BLD MANUAL: 2 % (ref 0–6)
NEUTS SEG # BLD: 4.4 K/UL (ref 1.8–8)
NEUTS SEG NFR BLD: 57 % (ref 32–75)
NRBC # BLD: 0 K/UL (ref 0–0.01)
NRBC BLD-RTO: 0 PER 100 WBC
PHOSPHATE SERPL-MCNC: 2.5 MG/DL (ref 2.6–4.7)
PLATELET # BLD AUTO: 194 K/UL (ref 150–400)
PMV BLD AUTO: 10.3 FL (ref 8.9–12.9)
POTASSIUM SERPL-SCNC: 4.6 MMOL/L (ref 3.5–5.1)
PROT SERPL-MCNC: 6.6 G/DL (ref 6.4–8.2)
RBC # BLD AUTO: 2.98 M/UL (ref 4.1–5.7)
RBC MORPH BLD: ABNORMAL
REPORTED DOSE,DOSE: ABNORMAL UNITS
REPORTED DOSE/TIME,TMG: ABNORMAL
SERVICE CMNT-IMP: ABNORMAL
SODIUM SERPL-SCNC: 133 MMOL/L (ref 136–145)
VANCOMYCIN TROUGH SERPL-MCNC: 12.4 UG/ML (ref 5–10)
VWF AG ACT/NOR PPP IA: 302 % (ref 50–200)
VWF:RCO ACT/NOR PPP PL AGG: 269 % (ref 50–200)
WBC # BLD AUTO: 7.5 K/UL (ref 4.1–11.1)

## 2021-11-03 PROCEDURE — 80053 COMPREHEN METABOLIC PANEL: CPT

## 2021-11-03 PROCEDURE — 84100 ASSAY OF PHOSPHORUS: CPT

## 2021-11-03 PROCEDURE — 82962 GLUCOSE BLOOD TEST: CPT

## 2021-11-03 PROCEDURE — 83735 ASSAY OF MAGNESIUM: CPT

## 2021-11-03 PROCEDURE — 74011250637 HC RX REV CODE- 250/637: Performed by: INTERNAL MEDICINE

## 2021-11-03 PROCEDURE — 36415 COLL VENOUS BLD VENIPUNCTURE: CPT

## 2021-11-03 PROCEDURE — 85025 COMPLETE CBC W/AUTO DIFF WBC: CPT

## 2021-11-03 PROCEDURE — 74011000250 HC RX REV CODE- 250: Performed by: INTERNAL MEDICINE

## 2021-11-03 PROCEDURE — 74011250636 HC RX REV CODE- 250/636: Performed by: INTERNAL MEDICINE

## 2021-11-03 PROCEDURE — 80202 ASSAY OF VANCOMYCIN: CPT

## 2021-11-03 PROCEDURE — 94760 N-INVAS EAR/PLS OXIMETRY 1: CPT

## 2021-11-03 PROCEDURE — 74011636637 HC RX REV CODE- 636/637: Performed by: INTERNAL MEDICINE

## 2021-11-03 PROCEDURE — 65270000029 HC RM PRIVATE

## 2021-11-03 RX ORDER — MAGNESIUM SULFATE HEPTAHYDRATE 40 MG/ML
2 INJECTION, SOLUTION INTRAVENOUS ONCE
Status: COMPLETED | OUTPATIENT
Start: 2021-11-03 | End: 2021-11-03

## 2021-11-03 RX ORDER — INSULIN GLARGINE 100 [IU]/ML
25 INJECTION, SOLUTION SUBCUTANEOUS
Status: DISCONTINUED | OUTPATIENT
Start: 2021-11-03 | End: 2021-11-05

## 2021-11-03 RX ADMIN — VANCOMYCIN HYDROCHLORIDE 1000 MG: 1 INJECTION, POWDER, LYOPHILIZED, FOR SOLUTION INTRAVENOUS at 22:32

## 2021-11-03 RX ADMIN — CEFEPIME HYDROCHLORIDE 2 G: 2 INJECTION, POWDER, FOR SOLUTION INTRAVENOUS at 05:41

## 2021-11-03 RX ADMIN — ENOXAPARIN SODIUM 40 MG: 100 INJECTION SUBCUTANEOUS at 05:41

## 2021-11-03 RX ADMIN — METRONIDAZOLE 500 MG: 500 INJECTION, SOLUTION INTRAVENOUS at 14:07

## 2021-11-03 RX ADMIN — INSULIN LISPRO 3 UNITS: 100 INJECTION, SOLUTION INTRAVENOUS; SUBCUTANEOUS at 22:29

## 2021-11-03 RX ADMIN — Medication 10 ML: at 14:09

## 2021-11-03 RX ADMIN — CEFEPIME HYDROCHLORIDE 2 G: 2 INJECTION, POWDER, FOR SOLUTION INTRAVENOUS at 12:01

## 2021-11-03 RX ADMIN — HYDROMORPHONE HYDROCHLORIDE 1 MG: 1 INJECTION, SOLUTION INTRAMUSCULAR; INTRAVENOUS; SUBCUTANEOUS at 22:42

## 2021-11-03 RX ADMIN — Medication 1 CAPSULE: at 08:47

## 2021-11-03 RX ADMIN — Medication 10 ML: at 22:31

## 2021-11-03 RX ADMIN — METRONIDAZOLE 500 MG: 500 INJECTION, SOLUTION INTRAVENOUS at 00:50

## 2021-11-03 RX ADMIN — DIAZEPAM 5 MG: 5 TABLET ORAL at 22:59

## 2021-11-03 RX ADMIN — Medication 10 ML: at 05:42

## 2021-11-03 RX ADMIN — VANCOMYCIN HYDROCHLORIDE 1000 MG: 1 INJECTION, POWDER, LYOPHILIZED, FOR SOLUTION INTRAVENOUS at 12:01

## 2021-11-03 RX ADMIN — MAGNESIUM SULFATE HEPTAHYDRATE 2 G: 40 INJECTION, SOLUTION INTRAVENOUS at 10:35

## 2021-11-03 RX ADMIN — ATORVASTATIN CALCIUM 20 MG: 20 TABLET, FILM COATED ORAL at 22:30

## 2021-11-03 RX ADMIN — INSULIN LISPRO 5 UNITS: 100 INJECTION, SOLUTION INTRAVENOUS; SUBCUTANEOUS at 17:23

## 2021-11-03 RX ADMIN — INSULIN LISPRO 5 UNITS: 100 INJECTION, SOLUTION INTRAVENOUS; SUBCUTANEOUS at 12:01

## 2021-11-03 RX ADMIN — VALSARTAN 80 MG: 80 TABLET, FILM COATED ORAL at 08:47

## 2021-11-03 RX ADMIN — INSULIN LISPRO 5 UNITS: 100 INJECTION, SOLUTION INTRAVENOUS; SUBCUTANEOUS at 08:47

## 2021-11-03 RX ADMIN — SODIUM CHLORIDE 75 ML/HR: 9 INJECTION, SOLUTION INTRAVENOUS at 22:42

## 2021-11-03 RX ADMIN — SODIUM CHLORIDE 75 ML/HR: 9 INJECTION, SOLUTION INTRAVENOUS at 05:41

## 2021-11-03 RX ADMIN — CEFEPIME HYDROCHLORIDE 2 G: 2 INJECTION, POWDER, FOR SOLUTION INTRAVENOUS at 22:28

## 2021-11-03 RX ADMIN — INSULIN GLARGINE 25 UNITS: 100 INJECTION, SOLUTION SUBCUTANEOUS at 22:29

## 2021-11-03 NOTE — PROGRESS NOTES
Bedside, Verbal and Written shift change report given to Tam Dhaval Chadwick (oncoming nurse) by Marina Calles (offgoing nurse). Report included the following information SBAR, Kardex, ED Summary, Procedure Summary, Intake/Output, MAR, Recent Results and Med Rec Status.

## 2021-11-03 NOTE — PROGRESS NOTES
11/3/2021   CARE MANAGEMENT NOTE:  CM reviewed EMR. Pt was admitted with right diabetic foot infection with osteo, abscess, and gangrene. Reportedly, pt resides alone in a ground floor apt located in [facility]. Scotty Espinoza (c [phone]) is the decision maker.     RUR 10%     Transition Plan of Care:  1. Ortho, ID, Podiatry following for medical management - pt is s/p I&D of right foot and great toe amputation, first metatarsal amp on 11/1.  2.  Plan is for pt to return home on IV ABX (orders pending)  3. Outpt f/u  4. Pt will arrange his own transportation home     CM will continue to follow pt until discharged.   Bere

## 2021-11-03 NOTE — PROGRESS NOTES
[facility] Dosing Services: Antimicrobial Stewardship Daily Doc    Consult for antibiotic dosing of Vancomycin by Dr. Мария Pringle  Indication: Diabetic foot ulcer, tenosynovitis, osteomyelitis (resistant Augmentin, failed Bactrim outpt)   Day of Therapy: 3    11/1 - I&D R foot, great toe and first metatarsal amputation    Ht Readings from Last 1 Encounters:   10/31/21 180.3 cm (71\")        Wt Readings from Last 1 Encounters:   10/31/21 104.3 kg (230 lb)        Vancomycin therapy:  Current maintenance dose: vancomycin 1000 mg IV every 12 hours   Last level: 12.4 mcg/mL on 11/3 (calculated )   Dose calculated to approximate a           a. Target AUC/NATHAN of 400-600          b. Trough of N/A     Plan: Continue vancomycin 1000 mg IV every 12 hours. Scr stable, urine output adequate, will continue to monitor closely. WBC WNL, afebrile for past 24 hours. Will order a random level ~1 week if patient remains stable. Dose administration notes:   Doses given appropriately as scheduled. Date Dose & Interval Measured (mcg/mL) Extrapolated (mcg/mL)   ? 11/3 @0943 1000 mg IV q12h  1250 mg IV q24h x1 ?12.4 (~12 hrs post-dose)  ?    ? ? ? ?   ? ? ? ? Other Antimicrobial   (not dosed by pharmacist) Cefepime 2 g IV every 8 hours  Metronidazole 500 mg IV every 12 hours    Cultures 10/31 Blood: NG x3 days (prelim)  11/1 Wound (foot): gram stain few GPC, light possible enterococcus, scant proteus (prelim)  11/1 Body fluid (abscess): few possible enterococcus  11/1 anaerobic: pending    10/22 Wound (PTA): klebsiella pneumoniae, enterobacter cloacae; resistant Augmentin   Serum Creatinine Lab Results   Component Value Date/Time    Creatinine 0.94 11/03/2021 12:12 AM    Creatinine (POC) 1.0 05/24/2019 12:45 AM         Creatinine Clearance Estimated Creatinine Clearance: 86 mL/min (based on SCr of 0.94 mg/dL).      Temp Temp: 98.1 °F (36.7 °C)       WBC Lab Results   Component Value Date/Time    WBC 7.5 11/03/2021 12:12 AM        Procalcitonin Lab Results   Component Value Date/Time    Procalcitonin 0.11 10/31/2021 09:43 PM        For Antifungals, Metronidazole and Nafcillin: Lab Results   Component Value Date/Time    ALT (SGPT) 32 11/03/2021 12:12 AM    AST (SGOT) 16 11/03/2021 12:12 AM    Alk.  phosphatase 114 11/03/2021 12:12 AM    Bilirubin, total 0.4 11/03/2021 12:12 AM        Pharmacist ZOEY LopezD

## 2021-11-03 NOTE — PROGRESS NOTES
Bedside and Verbal shift change report given to JULITO Whitney (oncoming nurse) by CARLOS Christensen RN (offgoing nurse). Report included the following information SBAR, Kardex, Intake/Output, MAR, Recent Results and Cardiac Rhythm  .

## 2021-11-03 NOTE — PROGRESS NOTES
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

## 2021-11-03 NOTE — PROGRESS NOTES
[facility]  [address] [facility], [address] [place] [address]  [phone]      Medical Progress Note      NAME: Milla Chapman   :  1950  MRM:  [id number]    Date/Time: 11/3/2021        Assessment / Plan:     65 yo M w/ hx of CAD, DM, von Williebrand disease, prostate CA presenting with R foot wound, found to have extensive DFI w/ osteomyelitis     Diabetic foot ulcer: osteomyelitis of the tibial hallux sesamoid. Gas forming cellulitis in the great toe. Plantar soft tissue ulceration has sinus tract from the ulcer to the tibial hallux sesamoid. Complete rupture of the FHL tendon, which may be secondarily infected. S/p I&D and great toe / first metatarsal amputation. Abx changed to IV vanc and cefepime and Flagyl, follow wound cultures. ID on board. Pathology report does show acute osteomyelitis extending to involve resection margin. Query need to return to OR? Staged surgery? Will need PICC line and 6 weeks of IV abx     DM type 2 causing renal, vascular and neurological disease: A1c 8.2. Poorly controlled. Increase Lantus to 25 units QHS. Continue SSI     CAD (coronary artery disease) / Hypertension: BP controlled. Cont ARB and statin. ASA on hold     Von Willebrand disease: Monitor for bleeding. ASA on hold for now     JAMES: POA. Mild, Cr normalized. Cont ARB     Chronic pain / Arthritis: APAP PRN     Anxiety and depression:  Valium PRN     Hx of prostate cancer: Outpatient follow up      Total time spent: 25 minutes  Time spent in the care of this patient including reviewing records, discussing with nursing and/or other providers on the treatment team, obtaining history and examining the patient, and discussing treatment plans.                   Care Plan discussed with: Patient, Nursing Staff and >50% of time spent in counseling and coordination of care    Discussed:  Care Plan and D/C Planning    Prophylaxis:  Lovenox    Disposition:  Home w/Family vs SNF Subjective:     Chief Complaint:  Follow up osteo    Chart/notes/labs/studies reviewed, patient examined. Denies pain in foot. No fevers          Objective:       Vitals:        Last 24hrs VS reviewed since prior progress note. Most recent are:    Visit Vitals  BP (!) 168/99 (BP 1 Location: Left upper arm, BP Patient Position: At rest)   Pulse 89   Temp 98.8 °F (37.1 °C)   Resp 18   Ht 5' 11\" (1.803 m)   Wt 104.3 kg (230 lb)   SpO2 97%   BMI 32.08 kg/m²     SpO2 Readings from Last 6 Encounters:   11/03/21 97%   08/09/19 98%   07/18/19 100%   05/24/19 97%   01/08/19 98%   10/31/17 95%    O2 Flow Rate (L/min): 2 l/min       Intake/Output Summary (Last 24 hours) at 11/3/2021 1835  Last data filed at 11/3/2021 1402  Gross per 24 hour   Intake    Output 2100 ml   Net -2100 ml          Exam:     Physical Exam:    Gen:  Chronically ill-appearing. NAD  HEENT:  Atraumatic, normocephalic. Sclerae nonicteric, hearing intact to voice  Neck:  Supple, without apparent masses. Resp:  No accessory muscle use, CTAB without wheezes, rales, or rhonchi  Card: RRR, without m/r/g. No LE edema  Abd:  +bowel sounds, soft, NTTP, nondistended. No HSM  Neuro: Face symmetric, speech fluent, follows commands appropriately, no focal weakness or numbness  Psych:  Alert, oriented x 3.  Fair insight  SKIN : R foot dressing C/D/I     Medications Reviewed: (see below)    Lab Data Reviewed: (see below)    ______________________________________________________________________    Medications:     Current Facility-Administered Medications   Medication Dose Route Frequency    L.acidophilus-paracasei-S.thermophil-bifidobacter (RISAQUAD) 8 billion cell capsule  1 Capsule Oral DAILY    vancomycin (VANCOCIN) 1,000 mg in 0.9% sodium chloride 250 mL (VIAL-MATE)  1,000 mg IntraVENous Q12H    cefepime (MAXIPIME) 2 g in sterile water (preservative free) 10 mL IV syringe  2 g IntraVENous Q8H    metroNIDAZOLE (FLAGYL) IVPB premix 500 mg  500 mg IntraVENous Q12H    enoxaparin (LOVENOX) injection 40 mg  40 mg SubCUTAneous Q24H    insulin glargine (LANTUS) injection 15 Units  15 Units SubCUTAneous QHS    [Held by provider] aspirin delayed-release tablet 81 mg  81 mg Oral DAILY    atorvastatin (LIPITOR) tablet 20 mg  20 mg Oral QHS    diazePAM (VALIUM) tablet 5 mg  5 mg Oral QHS PRN    valsartan (DIOVAN) tablet 80 mg  80 mg Oral DAILY    0.9% sodium chloride infusion  75 mL/hr IntraVENous CONTINUOUS    sodium chloride (NS) flush 5-40 mL  5-40 mL IntraVENous Q8H    sodium chloride (NS) flush 5-40 mL  5-40 mL IntraVENous PRN    acetaminophen (TYLENOL) tablet 650 mg  650 mg Oral Q6H PRN    Or    acetaminophen (TYLENOL) suppository 650 mg  650 mg Rectal Q6H PRN    polyethylene glycol (MIRALAX) packet 17 g  17 g Oral DAILY PRN    ondansetron (ZOFRAN ODT) tablet 4 mg  4 mg Oral Q8H PRN    Or    ondansetron (ZOFRAN) injection 4 mg  4 mg IntraVENous Q6H PRN    HYDROmorphone (DILAUDID) injection 1 mg  1 mg IntraVENous Q6H PRN    insulin lispro (HUMALOG) injection   SubCUTAneous AC&HS    glucose chewable tablet 16 g  4 Tablet Oral PRN    dextrose (D50W) injection syrg 12.5-25 g  12.5-25 g IntraVENous PRN    glucagon (GLUCAGEN) injection 1 mg  1 mg IntraMUSCular PRN            Lab Review:     Recent Labs     11/03/21  0012 11/02/21  0205 11/01/21 0148   WBC 7.5 9.3 7.1   HGB 9.5* 10.2* 13.6   HCT 28.7* 31.6* 40.9    224 302     Recent Labs     11/03/21  0012 11/02/21  0205 11/01/21  0148   * 134* 132*   K 4.6 5.0 4.9    104 102   CO2 22 25 22   * 231* 225*   BUN 13 13 19   CREA 0.94 0.87 1.31*   CA 8.0* 8.0* 8.7   MG 1.5* 1.8 1.8   PHOS 2.5* 2.7  --    ALB 2.0* 2.1* 2.4*   ALT 32 43 49     No components found for: GLPOC  No results for input(s): PH, PCO2, PO2, HCO3, FIO2 in the last 72 hours. No results for input(s): INR, INREXT, INREXT in the last 72 hours.   No results found for: SDES  Lab Results   Component Value Date/Time    Culture result: FEW POSSIBLE ENTEROCOCCUS SPECIES (A) 11/01/2021 08:28 PM    Culture result: (A) 11/01/2021 08:28 PM     LIGHT STAPHYLOCOCCUS SPECIES, COAGULASE NEGATIVE 2 COLONY TYPES/STRAINS    Culture result: LIGHT DIPHTHEROIDS (A) 11/01/2021 08:28 PM    Culture result: CHECKING FOR POSSIBLE  ANAEROBIC GROWTH   11/01/2021 08:28 PM              ___________________________________________________    Attending Physician: Tabatha Sierra MD

## 2021-11-04 LAB
ALBUMIN SERPL-MCNC: 2 G/DL (ref 3.5–5)
ANION GAP SERPL CALC-SCNC: 6 MMOL/L (ref 5–15)
BACTERIA SPEC CULT: ABNORMAL
BACTERIA SPEC CULT: NORMAL
BASOPHILS # BLD: 0.1 K/UL (ref 0–0.1)
BASOPHILS NFR BLD: 1 % (ref 0–1)
BUN SERPL-MCNC: 11 MG/DL (ref 6–20)
BUN/CREAT SERPL: 13 (ref 12–20)
CALCIUM SERPL-MCNC: 8.5 MG/DL (ref 8.5–10.1)
CHLORIDE SERPL-SCNC: 102 MMOL/L (ref 97–108)
CO2 SERPL-SCNC: 26 MMOL/L (ref 21–32)
CREAT SERPL-MCNC: 0.87 MG/DL (ref 0.7–1.3)
DIFFERENTIAL METHOD BLD: ABNORMAL
EOSINOPHIL # BLD: 0.3 K/UL (ref 0–0.4)
EOSINOPHIL NFR BLD: 4 % (ref 0–7)
ERYTHROCYTE [DISTWIDTH] IN BLOOD BY AUTOMATED COUNT: 13.1 % (ref 11.5–14.5)
GLUCOSE BLD STRIP.AUTO-MCNC: 258 MG/DL (ref 65–117)
GLUCOSE BLD STRIP.AUTO-MCNC: 271 MG/DL (ref 65–117)
GLUCOSE BLD STRIP.AUTO-MCNC: 277 MG/DL (ref 65–117)
GLUCOSE BLD STRIP.AUTO-MCNC: 282 MG/DL (ref 65–117)
GLUCOSE SERPL-MCNC: 263 MG/DL (ref 65–100)
GRAM STN SPEC: ABNORMAL
HCT VFR BLD AUTO: 32.4 % (ref 36.6–50.3)
HGB BLD-MCNC: 10.7 G/DL (ref 12.1–17)
IMM GRANULOCYTES # BLD AUTO: 0.3 K/UL (ref 0–0.04)
IMM GRANULOCYTES NFR BLD AUTO: 4 % (ref 0–0.5)
LYMPHOCYTES # BLD: 1.1 K/UL (ref 0.8–3.5)
LYMPHOCYTES NFR BLD: 15 % (ref 12–49)
MAGNESIUM SERPL-MCNC: 1.8 MG/DL (ref 1.6–2.4)
MCH RBC QN AUTO: 32.3 PG (ref 26–34)
MCHC RBC AUTO-ENTMCNC: 33 G/DL (ref 30–36.5)
MCV RBC AUTO: 97.9 FL (ref 80–99)
MONOCYTES # BLD: 0.8 K/UL (ref 0–1)
MONOCYTES NFR BLD: 10 % (ref 5–13)
NEUTS SEG # BLD: 5 K/UL (ref 1.8–8)
NEUTS SEG NFR BLD: 66 % (ref 32–75)
NRBC # BLD: 0 K/UL (ref 0–0.01)
NRBC BLD-RTO: 0 PER 100 WBC
PHOSPHATE SERPL-MCNC: 2.4 MG/DL (ref 2.6–4.7)
PLATELET # BLD AUTO: 228 K/UL (ref 150–400)
PMV BLD AUTO: 10.4 FL (ref 8.9–12.9)
POTASSIUM SERPL-SCNC: 4.3 MMOL/L (ref 3.5–5.1)
RBC # BLD AUTO: 3.31 M/UL (ref 4.1–5.7)
RBC MORPH BLD: ABNORMAL
SERVICE CMNT-IMP: ABNORMAL
SERVICE CMNT-IMP: NORMAL
SODIUM SERPL-SCNC: 134 MMOL/L (ref 136–145)
WBC # BLD AUTO: 7.6 K/UL (ref 4.1–11.1)

## 2021-11-04 PROCEDURE — 94760 N-INVAS EAR/PLS OXIMETRY 1: CPT

## 2021-11-04 PROCEDURE — 97116 GAIT TRAINING THERAPY: CPT

## 2021-11-04 PROCEDURE — 74011250636 HC RX REV CODE- 250/636: Performed by: INTERNAL MEDICINE

## 2021-11-04 PROCEDURE — 74011636637 HC RX REV CODE- 636/637: Performed by: INTERNAL MEDICINE

## 2021-11-04 PROCEDURE — 74011250637 HC RX REV CODE- 250/637: Performed by: INTERNAL MEDICINE

## 2021-11-04 PROCEDURE — 36415 COLL VENOUS BLD VENIPUNCTURE: CPT

## 2021-11-04 PROCEDURE — 74011000258 HC RX REV CODE- 258: Performed by: INTERNAL MEDICINE

## 2021-11-04 PROCEDURE — 97535 SELF CARE MNGMENT TRAINING: CPT

## 2021-11-04 PROCEDURE — 97530 THERAPEUTIC ACTIVITIES: CPT

## 2021-11-04 PROCEDURE — 85025 COMPLETE CBC W/AUTO DIFF WBC: CPT

## 2021-11-04 PROCEDURE — 99232 SBSQ HOSP IP/OBS MODERATE 35: CPT | Performed by: INTERNAL MEDICINE

## 2021-11-04 PROCEDURE — 65270000029 HC RM PRIVATE

## 2021-11-04 PROCEDURE — 83735 ASSAY OF MAGNESIUM: CPT

## 2021-11-04 PROCEDURE — 80069 RENAL FUNCTION PANEL: CPT

## 2021-11-04 PROCEDURE — 74011000250 HC RX REV CODE- 250: Performed by: INTERNAL MEDICINE

## 2021-11-04 PROCEDURE — 82962 GLUCOSE BLOOD TEST: CPT

## 2021-11-04 RX ORDER — VALSARTAN 80 MG/1
80 TABLET ORAL
Status: COMPLETED | OUTPATIENT
Start: 2021-11-04 | End: 2021-11-04

## 2021-11-04 RX ORDER — VALSARTAN 80 MG/1
160 TABLET ORAL DAILY
Status: DISCONTINUED | OUTPATIENT
Start: 2021-11-05 | End: 2021-11-09 | Stop reason: HOSPADM

## 2021-11-04 RX ORDER — LABETALOL HCL 20 MG/4 ML
10 SYRINGE (ML) INTRAVENOUS
Status: DISCONTINUED | OUTPATIENT
Start: 2021-11-04 | End: 2021-11-09 | Stop reason: HOSPADM

## 2021-11-04 RX ADMIN — ENOXAPARIN SODIUM 40 MG: 100 INJECTION SUBCUTANEOUS at 05:12

## 2021-11-04 RX ADMIN — Medication 10 ML: at 05:12

## 2021-11-04 RX ADMIN — AMPICILLIN AND SULBACTAM 3 G: 2; 1 INJECTION, POWDER, FOR SOLUTION INTRAMUSCULAR; INTRAVENOUS at 12:24

## 2021-11-04 RX ADMIN — INSULIN LISPRO 3 UNITS: 100 INJECTION, SOLUTION INTRAVENOUS; SUBCUTANEOUS at 21:13

## 2021-11-04 RX ADMIN — AMPICILLIN AND SULBACTAM 3 G: 2; 1 INJECTION, POWDER, FOR SOLUTION INTRAMUSCULAR; INTRAVENOUS at 23:32

## 2021-11-04 RX ADMIN — HYDROMORPHONE HYDROCHLORIDE 1 MG: 1 INJECTION, SOLUTION INTRAMUSCULAR; INTRAVENOUS; SUBCUTANEOUS at 15:18

## 2021-11-04 RX ADMIN — INSULIN LISPRO 5 UNITS: 100 INJECTION, SOLUTION INTRAVENOUS; SUBCUTANEOUS at 17:52

## 2021-11-04 RX ADMIN — INSULIN LISPRO 5 UNITS: 100 INJECTION, SOLUTION INTRAVENOUS; SUBCUTANEOUS at 12:25

## 2021-11-04 RX ADMIN — METRONIDAZOLE 500 MG: 500 INJECTION, SOLUTION INTRAVENOUS at 01:04

## 2021-11-04 RX ADMIN — CEFEPIME HYDROCHLORIDE 2 G: 2 INJECTION, POWDER, FOR SOLUTION INTRAVENOUS at 05:12

## 2021-11-04 RX ADMIN — Medication 1 CAPSULE: at 09:10

## 2021-11-04 RX ADMIN — Medication 10 ML: at 23:33

## 2021-11-04 RX ADMIN — ATORVASTATIN CALCIUM 20 MG: 20 TABLET, FILM COATED ORAL at 21:12

## 2021-11-04 RX ADMIN — INSULIN GLARGINE 25 UNITS: 100 INJECTION, SOLUTION SUBCUTANEOUS at 21:14

## 2021-11-04 RX ADMIN — Medication 10 ML: at 21:15

## 2021-11-04 RX ADMIN — VALSARTAN 80 MG: 80 TABLET, FILM COATED ORAL at 09:10

## 2021-11-04 RX ADMIN — HYDROMORPHONE HYDROCHLORIDE 1 MG: 1 INJECTION, SOLUTION INTRAMUSCULAR; INTRAVENOUS; SUBCUTANEOUS at 21:24

## 2021-11-04 RX ADMIN — INSULIN LISPRO 5 UNITS: 100 INJECTION, SOLUTION INTRAVENOUS; SUBCUTANEOUS at 09:10

## 2021-11-04 RX ADMIN — VALSARTAN 80 MG: 80 TABLET, FILM COATED ORAL at 21:12

## 2021-11-04 RX ADMIN — VANCOMYCIN HYDROCHLORIDE 1000 MG: 1 INJECTION, POWDER, LYOPHILIZED, FOR SOLUTION INTRAVENOUS at 11:16

## 2021-11-04 RX ADMIN — HYDROMORPHONE HYDROCHLORIDE 1 MG: 1 INJECTION, SOLUTION INTRAMUSCULAR; INTRAVENOUS; SUBCUTANEOUS at 05:10

## 2021-11-04 RX ADMIN — AMPICILLIN AND SULBACTAM 3 G: 2; 1 INJECTION, POWDER, FOR SOLUTION INTRAMUSCULAR; INTRAVENOUS at 17:53

## 2021-11-04 NOTE — PROGRESS NOTES
Problem: Self Care Deficits Care Plan (Adult)  Goal: *Acute Goals and Plan of Care (Insert Text)  Description:   FUNCTIONAL STATUS PRIOR TO ADMISSION: Patient was modified independent using a cane for community mobility and no device for household mobility. Drives private vehicle. HOME SUPPORT PRIOR TO ADMISSION: The patient lived alone with neighbors to provide assistance. Occupational Therapy Goals  Initiated 11/2/2021  1. Patient will perform lower body dressing with minimal assistance/contact guard assist within 7 day(s). 2.  Patient will perform bathing task with minimal assistance/contact guard assist within 7 day(s). 3.  Patient will perform toilet transfers with minimal assistance/contact guard assist within 7 day(s). 4.  Patient will perform all aspects of toileting with minimal assistance/contact guard assist within 7 day(s). 5.  Patient will participate in upper extremity therapeutic exercise/activities with supervision/set-up for 10 minutes within 7 day(s). Outcome: Progressing Towards Goal   OCCUPATIONAL THERAPY TREATMENT  Patient: Garry Bull (89 y.o. male)  Date: 11/4/2021  Diagnosis: Diabetic foot ulcer ([facility] [address] [E11.621, L97.509]   Diabetic foot ulcer ([facility] [address]  Procedure(s) (LRB):  INCISION AND DRAINAGE RIGHT FOOT, GREAT TOE AMPUTATION, FIRSTMETATARSAL AMPUTATION (Right) 3 Days Post-Op  Precautions: NWB, Fall (RLE NWB)  Chart, occupational therapy assessment, plan of care, and goals were reviewed. ASSESSMENT  Patient continues with skilled OT services and is progressing towards goals. Pt sat edge of bed as linens were wet and needed to doff/don gown. He needed moderate assist x 2 to stand and to transfer to [address] [place] [address] Rd insists on one therapist holding RLE up with a strap during transfer to maintain NWB status. He needs frequent cues for safety. Patient performed his own hygiene with close assistance for safety  Pt returned to bed following treatment.     Current Level of Function Impacting Discharge (ADLs): set-up gown, moderate assist x 2 transfer to [facility]    Other factors to consider for discharge:          PLAN :  Patient continues to benefit from skilled intervention to address the above impairments. Continue treatment per established plan of care to address goals. Recommend with staff: out of bed to chair assist x 2 and to [facility]    Recommend next OT session: cont towards goals    Recommendation for discharge: (in order for the patient to meet his/her long term goals)  Therapy up to 5 days/week in SNF setting    This discharge recommendation:  Has not yet been discussed the attending provider and/or case management    IF patient discharges home will need the following DME:        SUBJECTIVE:   Patient stated I have a way.     OBJECTIVE DATA SUMMARY:   Cognitive/Behavioral Status:  Neurologic State: Alert  Orientation Level: Oriented X4  Cognition: Follows commands             Functional Mobility and Transfers for ADLs:  Bed Mobility:  Supine to Sit: Minimum assistance; Additional time    Transfers:  Sit to Stand: Assist x2; Moderate assistance  Functional Transfers  Toilet Transfer : Moderate assistance; Assist x2       Balance:  Sitting: Intact  Standing: Impaired  Standing - Static: Constant support  Standing - Dynamic : Constant support    ADL Intervention:                                Toileting  Bowel Hygiene: Contact guard assistance         Activity Tolerance:   Fair    After treatment patient left in no apparent distress:   Supine in bed    COMMUNICATION/COLLABORATION:   The patients plan of care was discussed with: Physical therapist, Occupational therapist, and Registered nurse.      SENTHIL Mcginnis  Time Calculation: 25 mins

## 2021-11-04 NOTE — PROGRESS NOTES
[facility] Progress Note           Gela Groves DO    [phone] Office  [phone]  Fax    2021      Assessment & Plan:   1. Right diabetic foot infection with osteomyelitis, abscess, and gangrene. Status post I&D with first metatarsal and great toe amputation 2021. Surgical cultures growing CoNS, diphtheroids, Proteus, and Enterococcus. Per pathology report margins are NOT clear. Await further podiatry input. Narrow antibiotics to Unasyn. Discharge antibiotic plan to be determined  2. Diabetes mellitus. Hemoglobin A1c 8.2  3. CKD. Creatinine stable   4. History of von Willebrand's disease            Subjective:     Upset about lack of communication regarding plan of care    Objective:     Vitals:   Visit Vitals  BP (!) 163/81 (BP 1 Location: Left upper arm, BP Patient Position: At rest)   Pulse 89   Temp 98.7 °F (37.1 °C)   Resp 18   Ht 5' 11\" (1.803 m)   Wt 230 lb (104.3 kg)   SpO2 95%   BMI 32.08 kg/m²        Tmax:  Temp (24hrs), Av.4 °F (36.9 °C), Min:97.6 °F (36.4 °C), Max:99.2 °F (37.3 °C)      Exam:   Patient is intubated:  no    Physical Examination:   General:   Agitated   Head:  Normocephalic, atraumatic. Eyes:  Conjunctivae clear   Neck: Supple       Lungs:   No distress. Chest wall:     Heart:     Abdomen:   non-distended   Extremities: Moves all. Foot dressed   Skin:  No rash   Neurologic:  No focal deficit     Labs:        No lab exists for component: ITNL   No results for input(s): CPK, CKMB, TROIQ in the last 72 hours. Recent Labs     21  0559 21  0012 21  0205   * 133* 134*   K 4.3 4.6 5.0    104 104   CO2 26 22 25   BUN 11 13 13   CREA 0.87 0.94 0.87   * 266* 231*   PHOS 2.4* 2.5* 2.7   MG 1.8 1.5* 1.8   ALB 2.0* 2.0* 2.1*   WBC 7.6 7.5 9.3   HGB 10.7* 9.5* 10.2*   HCT 32.4* 28.7* 31.6*    194 224     No results for input(s): INR, PTP, APTT, INREXT in the last 72 hours.   Needs: urine analysis, urine sodium, protein and creatinine  Lab Results   Component Value Date/Time    Sodium,urine random 76 11/01/2021 05:08 AM         Cultures:     No results found for: SDES  Lab Results   Component Value Date/Time    Culture result: FEW POSSIBLE ENTEROCOCCUS SPECIES (A) 11/01/2021 08:28 PM    Culture result: (A) 11/01/2021 08:28 PM     LIGHT STAPHYLOCOCCUS SPECIES, COAGULASE NEGATIVE 2 COLONY TYPES/STRAINS    Culture result: LIGHT DIPHTHEROIDS (A) 11/01/2021 08:28 PM    Culture result: CHECKING FOR POSSIBLE  ANAEROBIC GROWTH   11/01/2021 08:28 PM       Radiology:     Medications       Current Facility-Administered Medications   Medication Dose Route Frequency Last Admin    insulin glargine (LANTUS) injection 25 Units  25 Units SubCUTAneous QHS 25 Units at 11/03/21 2229    L.acidophilus-paracasei-S.thermophil-bifidobacter (RISAQUAD) 8 billion cell capsule  1 Capsule Oral DAILY 1 Capsule at 11/04/21 0910    vancomycin (VANCOCIN) 1,000 mg in 0.9% sodium chloride 250 mL (VIAL-MATE)  1,000 mg IntraVENous Q12H 1,000 mg at 11/04/21 1116    cefepime (MAXIPIME) 2 g in sterile water (preservative free) 10 mL IV syringe  2 g IntraVENous Q8H 2 g at 11/04/21 0512    metroNIDAZOLE (FLAGYL) IVPB premix 500 mg  500 mg IntraVENous Q12H 500 mg at 11/04/21 0104    enoxaparin (LOVENOX) injection 40 mg  40 mg SubCUTAneous Q24H 40 mg at 11/04/21 0512    [Held by provider] aspirin delayed-release tablet 81 mg  81 mg Oral DAILY      atorvastatin (LIPITOR) tablet 20 mg  20 mg Oral QHS 20 mg at 11/03/21 2230    diazePAM (VALIUM) tablet 5 mg  5 mg Oral QHS PRN 5 mg at 11/03/21 2259    valsartan (DIOVAN) tablet 80 mg  80 mg Oral DAILY 80 mg at 11/04/21 0910    0.9% sodium chloride infusion  75 mL/hr IntraVENous CONTINUOUS 75 mL/hr at 11/03/21 2242    sodium chloride (NS) flush 5-40 mL  5-40 mL IntraVENous Q8H 10 mL at 11/04/21 0512    sodium chloride (NS) flush 5-40 mL  5-40 mL IntraVENous PRN      acetaminophen (TYLENOL) tablet 650 mg  650 mg Oral Q6H  mg at 11/02/21 2142    Or    acetaminophen (TYLENOL) suppository 650 mg  650 mg Rectal Q6H PRN      polyethylene glycol (MIRALAX) packet 17 g  17 g Oral DAILY PRN      ondansetron (ZOFRAN ODT) tablet 4 mg  4 mg Oral Q8H PRN      Or    ondansetron (ZOFRAN) injection 4 mg  4 mg IntraVENous Q6H PRN      HYDROmorphone (DILAUDID) injection 1 mg  1 mg IntraVENous Q6H PRN 1 mg at 11/04/21 0510    insulin lispro (HUMALOG) injection   SubCUTAneous AC&HS 5 Units at 11/04/21 0910    glucose chewable tablet 16 g  4 Tablet Oral PRN      dextrose (D50W) injection syrg 12.5-25 g  12.5-25 g IntraVENous PRN      glucagon (GLUCAGEN) injection 1 mg  1 mg IntraMUSCular PRN             Case discussed with:      Gela Groves DO

## 2021-11-04 NOTE — PROGRESS NOTES
11/4/2021   CARE MANAGEMENT NOTE:  CM reviewed EMR. Pt was admitted with right diabetic foot infection with osteo, abscess, and gangrene. Reportedly, pt resides alone in a ground floor apt located in [facility]. Eduarda Mon (c [phone]) is the decision maker.     RUR 9%     Transition Plan of Care:  1.  Ortho, ID, Podiatry following for medical management - pt is s/p I&D of right foot and great toe amputation, first metatarsal amp on 11/1.  2. Houston Mao is for pt to return home on IV ABX (orders pending)  3.  Outpt f/u  4.  Pt will arrange his own transportation home     CM will continue to follow pt until discharged.   Bere

## 2021-11-04 NOTE — PROGRESS NOTES
Jd Swanson [facility] [address]  [address], [facility], [address] [facility]  [phone]      Medical Progress Note      NAME: Joni Wesftall   :  1950  MRM:  [id number]    Date/Time: 2021        Assessment / Plan:     67 yo M w/ hx of CAD, DM, von Williebrand disease, prostate CA presenting with R foot wound, found to have extensive DFI w/ osteomyelitis     Diabetic foot ulcer: osteomyelitis of the tibial hallux sesamoid. Gas forming cellulitis in the great toe. Plantar soft tissue ulceration has sinus tract from the ulcer to the tibial hallux sesamoid. Complete rupture of the FHL tendon, which may be secondarily infected. S/p I&D and great toe / first metatarsal amputation. Intraoperative culture grew CoNS, diphtheroids, Proteus, and Enterococcus. IV abx deescalated from IV vanc, cefepime and Flagyl to Unasyn. Appreciate ID following. Pathology report does show acute osteomyelitis extending to involve resection margin. Awaiting podiatry plans on return to OR? Staged surgery? Anticipate need for PICC line and 6 weeks of IV abx     DM type 2 causing renal, vascular and neurological disease: A1c 8.2. Poorly controlled. Cont Lantus 25 units QHS, SSI     CAD (coronary artery disease) / Hypertension: BP controlled. Cont ARB and statin. ASA on hold     Von Willebrand disease: Monitor for bleeding. ASA on hold for now     JAMES: POA. Mild, Cr normalized. Cont ARB     Chronic pain / Arthritis: APAP PRN     Anxiety and depression:  Valium PRN     Hx of prostate cancer: Outpatient follow up      Total time spent: 35 minutes, d/w ID  Time spent in the care of this patient including reviewing records, discussing with nursing and/or other providers on the treatment team, obtaining history and examining the patient, and discussing treatment plans.                   Care Plan discussed with: Patient, Nursing Staff and >50% of time spent in counseling and coordination of care    Discussed:  Care Plan and D/C Planning    Prophylaxis:  Lovenox    Disposition:  Home w/Family vs SNF         Subjective:     Chief Complaint:  Follow up osteo    Chart/notes/labs/studies reviewed, patient examined. No pain in foot. No fevers. Objective:       Vitals:        Last 24hrs VS reviewed since prior progress note. Most recent are:    Visit Vitals  BP (!) 183/71 (BP 1 Location: Left lower arm, BP Patient Position: At rest)   Pulse 96   Temp 99.4 °F (37.4 °C)   Resp 18   Ht 5' 11\" (1.803 m)   Wt 104.3 kg (230 lb)   SpO2 96%   BMI 32.08 kg/m²     SpO2 Readings from Last 6 Encounters:   11/04/21 96%   08/09/19 98%   07/18/19 100%   05/24/19 97%   01/08/19 98%   10/31/17 95%    O2 Flow Rate (L/min): 2 l/min       Intake/Output Summary (Last 24 hours) at 11/4/2021 1823  Last data filed at 11/4/2021 1802  Gross per 24 hour   Intake 1260 ml   Output 2500 ml   Net -1240 ml          Exam:     Physical Exam:    Gen:  Chronically ill-appearing. NAD  HEENT:  Atraumatic, normocephalic. Sclerae nonicteric, hearing intact to voice  Neck:  Supple, without apparent masses. Resp:  No accessory muscle use, CTAB without wheezes, rales, or rhonchi  Card: RRR, without m/r/g. No LE edema  Abd:  +bowel sounds, soft, NTTP, nondistended. No HSM  Neuro: Face symmetric, speech fluent, follows commands appropriately, no focal weakness or numbness  Psych:  Alert, oriented x 3.  Fair insight  SKIN: R foot dressing C/D/I     Medications Reviewed: (see below)    Lab Data Reviewed: (see below)    ______________________________________________________________________    Medications:     Current Facility-Administered Medications   Medication Dose Route Frequency    ampicillin-sulbactam (UNASYN) 3 g in 0.9% sodium chloride (MBP/ADV) 100 mL MBP  3 g IntraVENous Q6H    labetaloL (NORMODYNE;TRANDATE) 20 mg/4 mL (5 mg/mL) injection 10 mg  10 mg IntraVENous Q4H PRN    [START ON 11/5/2021] valsartan (DIOVAN) tablet 160 mg  160 mg Oral DAILY    valsartan (DIOVAN) tablet 80 mg  80 mg Oral NOW    insulin glargine (LANTUS) injection 25 Units  25 Units SubCUTAneous QHS    L.acidophilus-paracasei-S.thermophil-bifidobacter (RISAQUAD) 8 billion cell capsule  1 Capsule Oral DAILY    enoxaparin (LOVENOX) injection 40 mg  40 mg SubCUTAneous Q24H    [Held by provider] aspirin delayed-release tablet 81 mg  81 mg Oral DAILY    atorvastatin (LIPITOR) tablet 20 mg  20 mg Oral QHS    diazePAM (VALIUM) tablet 5 mg  5 mg Oral QHS PRN    sodium chloride (NS) flush 5-40 mL  5-40 mL IntraVENous Q8H    sodium chloride (NS) flush 5-40 mL  5-40 mL IntraVENous PRN    acetaminophen (TYLENOL) tablet 650 mg  650 mg Oral Q6H PRN    Or    acetaminophen (TYLENOL) suppository 650 mg  650 mg Rectal Q6H PRN    polyethylene glycol (MIRALAX) packet 17 g  17 g Oral DAILY PRN    ondansetron (ZOFRAN ODT) tablet 4 mg  4 mg Oral Q8H PRN    Or    ondansetron (ZOFRAN) injection 4 mg  4 mg IntraVENous Q6H PRN    HYDROmorphone (DILAUDID) injection 1 mg  1 mg IntraVENous Q6H PRN    insulin lispro (HUMALOG) injection   SubCUTAneous AC&HS    glucose chewable tablet 16 g  4 Tablet Oral PRN    dextrose (D50W) injection syrg 12.5-25 g  12.5-25 g IntraVENous PRN    glucagon (GLUCAGEN) injection 1 mg  1 mg IntraMUSCular PRN            Lab Review:     Recent Labs     11/04/21  0559 11/03/21 0012 11/02/21  0205   WBC 7.6 7.5 9.3   HGB 10.7* 9.5* 10.2*   HCT 32.4* 28.7* 31.6*    194 224     Recent Labs     11/04/21  0559 11/03/21  0012 11/02/21  0205   * 133* 134*   K 4.3 4.6 5.0    104 104   CO2 26 22 25   * 266* 231*   BUN 11 13 13   CREA 0.87 0.94 0.87   CA 8.5 8.0* 8.0*   MG 1.8 1.5* 1.8   PHOS 2.4* 2.5* 2.7   ALB 2.0* 2.0* 2.1*   ALT  --  32 43     No components found for: GLPOC  No results for input(s): PH, PCO2, PO2, HCO3, FIO2 in the last 72 hours. No results for input(s): INR, INREXT, INREXT in the last 72 hours.   No results found for: SDES  Lab Results   Component Value Date/Time    Culture result: (A) 11/01/2021 08:28 PM     LIGHT ENTEROCOCCUS FAECALIS REFER TO [id number] FOR SUSCEPTIBILITIES    Culture result: (A) 11/01/2021 08:28 PM     LIGHT STAPHYLOCOCCUS SPECIES, COAGULASE NEGATIVE 2 COLONY TYPES/STRAINS    Culture result: LIGHT DIPHTHEROIDS (A) 11/01/2021 08:28 PM    Culture result: NO ANAEROBES ISOLATED 11/01/2021 08:28 PM              ___________________________________________________    Attending Physician: Sang Diane MD

## 2021-11-04 NOTE — PROGRESS NOTES
Problem: Mobility Impaired (Adult and Pediatric)  Goal: *Acute Goals and Plan of Care (Insert Text)  Description: FUNCTIONAL STATUS PRIOR TO ADMISSION: Patient was modified independent using a cane for community mobility and no device for household mobility. Drives private vehicle. HOME SUPPORT PRIOR TO ADMISSION: The patient lived alone with neighbors to provide assistance. Physical Therapy Goals  Initiated 11/2/2021  1. Patient will move from supine to sit and sit to supine  in bed with supervision/set-up within 7 day(s). 2.  Patient will transfer from bed to chair and chair to bed with minimal assistance/contact guard assist using the least restrictive device within 7 day(s). 3.  Patient will perform sit to stand with minimal assistance/contact guard assist within 7 day(s). 4.  Patient will ambulate with minimal assistance/contact guard assist for 10 feet with the least restrictive device while maintaining NWB RLE within 7 day(s). 5.  Patient will ascend/descend 3 stairs with one handrail(s) and 5 stairs with bilateral handrails with moderate assistance  within 7 day(s). Outcome: Progressing Towards Goal   PHYSICAL THERAPY TREATMENT  Patient: Arabella Ramirez (85 y.o. male)  Date: 11/4/2021  Diagnosis: Diabetic foot ulcer ([facility] [E11.621, L97.509]   Diabetic foot ulcer ([facility]  Procedure(s) (LRB):  INCISION AND DRAINAGE RIGHT FOOT, GREAT TOE AMPUTATION, FIRSTMETATARSAL AMPUTATION (Right) 3 Days Post-Op  Precautions: NWB, Fall (RLE NWB)  Chart, physical therapy assessment, plan of care and goals were reviewed. ASSESSMENT  Patient continues with skilled PT services and is slowly progressing towards goals. Patient is very particular in his participation with PT and likes to do things his own way. He insists on returning home alone to his apartment and refuses a wheelchair. States he has everything he needs and neighbors will check on him.   Patient is very high fall risk and unrealistic in his abilities. He declines a wheelchair and refuses rehab. Current Level of Function Impacting Discharge (mobility/balance): Educated patient regarding NWB gait with rolling walker. He transferred to and from [facility] with +2 mod assist.  Patient insists on one therapist holding RLE up with a strap during transfer to maintain NWB status. He needs frequent cues for safety. Patient performed his own hygiene with close assistance for safety. Returned to bed at his request with +2 mod assist.     Other factors to consider for discharge: fall risk; NWB gait         PLAN :  Patient continues to benefit from skilled intervention to address the above impairments. Continue treatment per established plan of care. to address goals. Recommendation for discharge: (in order for the patient to meet his/her long term goals)  Therapy up to 5 days/week in SNF setting    This discharge recommendation:  Has not yet been discussed the attending provider and/or case management    IF patient discharges home will need the following DME: patient owns DME required for discharge except for wheel chair which he refuses       SUBJECTIVE:   Patient stated I am stubborn and ornery, but I know what works for me.     OBJECTIVE DATA SUMMARY:   Critical Behavior:  Neurologic State: Alert  Orientation Level: Oriented X4  Cognition: Follows commands  Safety/Judgement: Decreased awareness of need for assistance, Decreased insight into deficits  Functional Mobility Training:  Bed Mobility:     Supine to Sit: Minimum assistance; Additional time              Transfers:  Sit to Stand: Assist x2; Moderate assistance                                Balance:  Sitting: Intact  Standing: Impaired  Standing - Static: Constant support  Standing - Dynamic : Constant support  Ambulation/Gait Training:  Distance (ft): 2 Feet (ft)  Assistive Device: Gait belt; Walker, rolling  Ambulation - Level of Assistance: Assist x2;  Moderate assistance        Gait Abnormalities: Decreased step clearance  Right Side Weight Bearing: As tolerated                                       Activity Tolerance:   Fair and fatigues easily    After treatment patient left in no apparent distress:   Supine in bed, Call bell within reach, and Side rails x 3    COMMUNICATION/COLLABORATION:   The patients plan of care was discussed with: Occupational therapy assistant, Registered nurse, and Certified nursing assistant/patient care technician.      Ines Charles, PT   Time Calculation: 25 mins

## 2021-11-04 NOTE — PROGRESS NOTES
Bedside, Verbal and Written shift change report given to Dedrick Gallagher RN (oncoming nurse) by Leslie Batres (offgoing nurse). Report included the following information SBAR, Kardex, ED Summary, Procedure Summary, Intake/Output, MAR, Recent Results and Med Rec Status.

## 2021-11-05 ENCOUNTER — ANESTHESIA EVENT (OUTPATIENT)
Dept: SURGERY | Age: 71
DRG: 239 | End: 2021-11-05
Payer: MEDICARE

## 2021-11-05 ENCOUNTER — ANESTHESIA (OUTPATIENT)
Dept: SURGERY | Age: 71
DRG: 239 | End: 2021-11-05
Payer: MEDICARE

## 2021-11-05 ENCOUNTER — APPOINTMENT (OUTPATIENT)
Dept: GENERAL RADIOLOGY | Age: 71
DRG: 239 | End: 2021-11-05
Attending: PODIATRIST
Payer: MEDICARE

## 2021-11-05 LAB
ALBUMIN SERPL-MCNC: 2.3 G/DL (ref 3.5–5)
ALBUMIN/GLOB SERPL: 0.4 {RATIO} (ref 1.1–2.2)
ALP SERPL-CCNC: 117 U/L (ref 45–117)
ALT SERPL-CCNC: 29 U/L (ref 12–78)
ANION GAP SERPL CALC-SCNC: 5 MMOL/L (ref 5–15)
AST SERPL-CCNC: 27 U/L (ref 15–37)
BASOPHILS # BLD: 0.1 K/UL (ref 0–0.1)
BASOPHILS NFR BLD: 1 % (ref 0–1)
BILIRUB SERPL-MCNC: 0.6 MG/DL (ref 0.2–1)
BUN SERPL-MCNC: 14 MG/DL (ref 6–20)
BUN/CREAT SERPL: 17 (ref 12–20)
CALCIUM SERPL-MCNC: 8.9 MG/DL (ref 8.5–10.1)
CHLORIDE SERPL-SCNC: 98 MMOL/L (ref 97–108)
CO2 SERPL-SCNC: 29 MMOL/L (ref 21–32)
CREAT SERPL-MCNC: 0.84 MG/DL (ref 0.7–1.3)
DIFFERENTIAL METHOD BLD: ABNORMAL
EOSINOPHIL # BLD: 0.4 K/UL (ref 0–0.4)
EOSINOPHIL NFR BLD: 4 % (ref 0–7)
ERYTHROCYTE [DISTWIDTH] IN BLOOD BY AUTOMATED COUNT: 13.1 % (ref 11.5–14.5)
GLOBULIN SER CALC-MCNC: 5.5 G/DL (ref 2–4)
GLUCOSE BLD STRIP.AUTO-MCNC: 168 MG/DL (ref 65–117)
GLUCOSE BLD STRIP.AUTO-MCNC: 182 MG/DL (ref 65–117)
GLUCOSE BLD STRIP.AUTO-MCNC: 188 MG/DL (ref 65–117)
GLUCOSE BLD STRIP.AUTO-MCNC: 226 MG/DL (ref 65–117)
GLUCOSE BLD STRIP.AUTO-MCNC: 329 MG/DL (ref 65–117)
GLUCOSE SERPL-MCNC: 193 MG/DL (ref 65–100)
HCT VFR BLD AUTO: 35.6 % (ref 36.6–50.3)
HGB BLD-MCNC: 11.6 G/DL (ref 12.1–17)
IMM GRANULOCYTES # BLD AUTO: 0 K/UL
IMM GRANULOCYTES NFR BLD AUTO: 0 %
LYMPHOCYTES # BLD: 1.9 K/UL (ref 0.8–3.5)
LYMPHOCYTES NFR BLD: 20 % (ref 12–49)
MAGNESIUM SERPL-MCNC: 1.7 MG/DL (ref 1.6–2.4)
MCH RBC QN AUTO: 31.9 PG (ref 26–34)
MCHC RBC AUTO-ENTMCNC: 32.6 G/DL (ref 30–36.5)
MCV RBC AUTO: 97.8 FL (ref 80–99)
METAMYELOCYTES NFR BLD MANUAL: 2 %
MONOCYTES # BLD: 0.9 K/UL (ref 0–1)
MONOCYTES NFR BLD: 9 % (ref 5–13)
NEUTS BAND NFR BLD MANUAL: 1 % (ref 0–6)
NEUTS SEG # BLD: 6.1 K/UL (ref 1.8–8)
NEUTS SEG NFR BLD: 63 % (ref 32–75)
NRBC # BLD: 0 K/UL (ref 0–0.01)
NRBC BLD-RTO: 0 PER 100 WBC
PHOSPHATE SERPL-MCNC: 2.3 MG/DL (ref 2.6–4.7)
PLATELET # BLD AUTO: 255 K/UL (ref 150–400)
PMV BLD AUTO: 10.6 FL (ref 8.9–12.9)
POTASSIUM SERPL-SCNC: 4.5 MMOL/L (ref 3.5–5.1)
PROT SERPL-MCNC: 7.8 G/DL (ref 6.4–8.2)
RBC # BLD AUTO: 3.64 M/UL (ref 4.1–5.7)
RBC MORPH BLD: ABNORMAL
SERVICE CMNT-IMP: ABNORMAL
SODIUM SERPL-SCNC: 132 MMOL/L (ref 136–145)
WBC # BLD AUTO: 9.5 K/UL (ref 4.1–11.1)

## 2021-11-05 PROCEDURE — 76010000138 HC OR TIME 0.5 TO 1 HR: Performed by: PODIATRIST

## 2021-11-05 PROCEDURE — 74011000258 HC RX REV CODE- 258: Performed by: INTERNAL MEDICINE

## 2021-11-05 PROCEDURE — 84100 ASSAY OF PHOSPHORUS: CPT

## 2021-11-05 PROCEDURE — 74011250637 HC RX REV CODE- 250/637: Performed by: INTERNAL MEDICINE

## 2021-11-05 PROCEDURE — 99232 SBSQ HOSP IP/OBS MODERATE 35: CPT | Performed by: INTERNAL MEDICINE

## 2021-11-05 PROCEDURE — 0YQM0ZZ REPAIR RIGHT FOOT, OPEN APPROACH: ICD-10-PCS | Performed by: PODIATRIST

## 2021-11-05 PROCEDURE — 36415 COLL VENOUS BLD VENIPUNCTURE: CPT

## 2021-11-05 PROCEDURE — 65270000029 HC RM PRIVATE

## 2021-11-05 PROCEDURE — 82962 GLUCOSE BLOOD TEST: CPT

## 2021-11-05 PROCEDURE — 2709999900 HC NON-CHARGEABLE SUPPLY: Performed by: PODIATRIST

## 2021-11-05 PROCEDURE — 85025 COMPLETE CBC W/AUTO DIFF WBC: CPT

## 2021-11-05 PROCEDURE — 76060000032 HC ANESTHESIA 0.5 TO 1 HR: Performed by: PODIATRIST

## 2021-11-05 PROCEDURE — 77030002916 HC SUT ETHLN J&J -A: Performed by: PODIATRIST

## 2021-11-05 PROCEDURE — 74011636637 HC RX REV CODE- 636/637: Performed by: INTERNAL MEDICINE

## 2021-11-05 PROCEDURE — 77030013708 HC HNDPC SUC IRR PULS STRY –B: Performed by: PODIATRIST

## 2021-11-05 PROCEDURE — 83735 ASSAY OF MAGNESIUM: CPT

## 2021-11-05 PROCEDURE — 74011250636 HC RX REV CODE- 250/636: Performed by: NURSE ANESTHETIST, CERTIFIED REGISTERED

## 2021-11-05 PROCEDURE — 74011000250 HC RX REV CODE- 250: Performed by: PODIATRIST

## 2021-11-05 PROCEDURE — 73630 X-RAY EXAM OF FOOT: CPT

## 2021-11-05 PROCEDURE — 94760 N-INVAS EAR/PLS OXIMETRY 1: CPT

## 2021-11-05 PROCEDURE — 76210000021 HC REC RM PH II 0.5 TO 1 HR: Performed by: PODIATRIST

## 2021-11-05 PROCEDURE — 77030011267 HC ELECTRD BLD COVD -A: Performed by: PODIATRIST

## 2021-11-05 PROCEDURE — 74011250636 HC RX REV CODE- 250/636: Performed by: INTERNAL MEDICINE

## 2021-11-05 PROCEDURE — 80053 COMPREHEN METABOLIC PANEL: CPT

## 2021-11-05 PROCEDURE — 77030008463 HC STPLR SKN PROX J&J -B: Performed by: PODIATRIST

## 2021-11-05 RX ORDER — PROPOFOL 10 MG/ML
INJECTION, EMULSION INTRAVENOUS
Status: DISCONTINUED | OUTPATIENT
Start: 2021-11-05 | End: 2021-11-05 | Stop reason: HOSPADM

## 2021-11-05 RX ORDER — PROPOFOL 10 MG/ML
INJECTION, EMULSION INTRAVENOUS AS NEEDED
Status: DISCONTINUED | OUTPATIENT
Start: 2021-11-05 | End: 2021-11-05 | Stop reason: HOSPADM

## 2021-11-05 RX ORDER — ASPIRIN 81 MG/1
81 TABLET ORAL DAILY
Status: CANCELLED | OUTPATIENT
Start: 2021-11-06

## 2021-11-05 RX ORDER — FENTANYL CITRATE 50 UG/ML
INJECTION, SOLUTION INTRAMUSCULAR; INTRAVENOUS AS NEEDED
Status: DISCONTINUED | OUTPATIENT
Start: 2021-11-05 | End: 2021-11-05 | Stop reason: HOSPADM

## 2021-11-05 RX ORDER — SODIUM CHLORIDE, SODIUM LACTATE, POTASSIUM CHLORIDE, CALCIUM CHLORIDE 600; 310; 30; 20 MG/100ML; MG/100ML; MG/100ML; MG/100ML
INJECTION, SOLUTION INTRAVENOUS
Status: DISCONTINUED | OUTPATIENT
Start: 2021-11-05 | End: 2021-11-05 | Stop reason: HOSPADM

## 2021-11-05 RX ORDER — MIDAZOLAM HYDROCHLORIDE 1 MG/ML
INJECTION, SOLUTION INTRAMUSCULAR; INTRAVENOUS AS NEEDED
Status: DISCONTINUED | OUTPATIENT
Start: 2021-11-05 | End: 2021-11-05 | Stop reason: HOSPADM

## 2021-11-05 RX ORDER — BUPIVACAINE HYDROCHLORIDE 5 MG/ML
INJECTION, SOLUTION EPIDURAL; INTRACAUDAL AS NEEDED
Status: DISCONTINUED | OUTPATIENT
Start: 2021-11-05 | End: 2021-11-05 | Stop reason: HOSPADM

## 2021-11-05 RX ORDER — INSULIN GLARGINE 100 [IU]/ML
30 INJECTION, SOLUTION SUBCUTANEOUS
Status: DISCONTINUED | OUTPATIENT
Start: 2021-11-05 | End: 2021-11-09 | Stop reason: HOSPADM

## 2021-11-05 RX ADMIN — INSULIN GLARGINE 30 UNITS: 100 INJECTION, SOLUTION SUBCUTANEOUS at 22:02

## 2021-11-05 RX ADMIN — FENTANYL CITRATE 50 MCG: 50 INJECTION, SOLUTION INTRAMUSCULAR; INTRAVENOUS at 11:40

## 2021-11-05 RX ADMIN — Medication 10 ML: at 14:38

## 2021-11-05 RX ADMIN — Medication 10 ML: at 22:03

## 2021-11-05 RX ADMIN — HYDROMORPHONE HYDROCHLORIDE 1 MG: 1 INJECTION, SOLUTION INTRAMUSCULAR; INTRAVENOUS; SUBCUTANEOUS at 20:38

## 2021-11-05 RX ADMIN — HYDROMORPHONE HYDROCHLORIDE 1 MG: 1 INJECTION, SOLUTION INTRAMUSCULAR; INTRAVENOUS; SUBCUTANEOUS at 03:46

## 2021-11-05 RX ADMIN — ATORVASTATIN CALCIUM 20 MG: 20 TABLET, FILM COATED ORAL at 22:02

## 2021-11-05 RX ADMIN — INSULIN LISPRO 3 UNITS: 100 INJECTION, SOLUTION INTRAVENOUS; SUBCUTANEOUS at 17:30

## 2021-11-05 RX ADMIN — PROPOFOL 100 MCG/KG/MIN: 10 INJECTION, EMULSION INTRAVENOUS at 11:50

## 2021-11-05 RX ADMIN — ACETAMINOPHEN 650 MG: 325 TABLET ORAL at 09:52

## 2021-11-05 RX ADMIN — INSULIN LISPRO 4 UNITS: 100 INJECTION, SOLUTION INTRAVENOUS; SUBCUTANEOUS at 22:02

## 2021-11-05 RX ADMIN — AMPICILLIN AND SULBACTAM 3 G: 2; 1 INJECTION, POWDER, FOR SOLUTION INTRAMUSCULAR; INTRAVENOUS at 22:04

## 2021-11-05 RX ADMIN — AMPICILLIN AND SULBACTAM 3 G: 2; 1 INJECTION, POWDER, FOR SOLUTION INTRAMUSCULAR; INTRAVENOUS at 17:30

## 2021-11-05 RX ADMIN — HYDROMORPHONE HYDROCHLORIDE 1 MG: 1 INJECTION, SOLUTION INTRAMUSCULAR; INTRAVENOUS; SUBCUTANEOUS at 14:37

## 2021-11-05 RX ADMIN — AMPICILLIN AND SULBACTAM 3 G: 2; 1 INJECTION, POWDER, FOR SOLUTION INTRAMUSCULAR; INTRAVENOUS at 12:01

## 2021-11-05 RX ADMIN — AMPICILLIN AND SULBACTAM 3 G: 2; 1 INJECTION, POWDER, FOR SOLUTION INTRAMUSCULAR; INTRAVENOUS at 06:08

## 2021-11-05 RX ADMIN — INSULIN LISPRO 2 UNITS: 100 INJECTION, SOLUTION INTRAVENOUS; SUBCUTANEOUS at 14:38

## 2021-11-05 RX ADMIN — SODIUM CHLORIDE, SODIUM LACTATE, POTASSIUM CHLORIDE, AND CALCIUM CHLORIDE: 600; 310; 30; 20 INJECTION, SOLUTION INTRAVENOUS at 11:00

## 2021-11-05 RX ADMIN — MIDAZOLAM 2 MG: 1 INJECTION, SOLUTION INTRAMUSCULAR; INTRAVENOUS at 11:40

## 2021-11-05 RX ADMIN — PROPOFOL 20 MG: 10 INJECTION, EMULSION INTRAVENOUS at 11:49

## 2021-11-05 NOTE — PROGRESS NOTES
Comprehensive Nutrition Assessment    Type and Reason for Visit: Initial, RD nutrition re-screen/LOS    Nutrition Recommendations/Plan:   1. Resume Regular, 3 Carb Choice diet order  2. Provide Enrrique BID (Lunch/Dinner) to aid in wound healing  3. Please obtain measured weight. Nutrition Assessment:    Pt is a 66year old male admitted with Diabetic foot ulcer ([facility] [G22.170, L97.509]. He  has a past medical history of Anxiety and depression, Arthritis, CAD (coronary artery disease) (03/2018), Chronic pain, CKD (chronic kidney disease), stage III ([facility] DM type 2 causing renal disease ([facility] DM type 2 causing vascular disease ([facility] Prostate cancer ([facility] and Sheral Shady disease ([facility] Pt off floor at time of RD visit. Per documentation, PO intake averaging 75% of all meals. NKFA. No noted chewing/swallowing problems. Lacking recent weight data - most recent weight 230#, unclear if stated or measured. Patient Vitals for the past 168 hrs:   % Diet Eaten   11/04/21 1802 76 - 100%   11/04/21 1239 76 - 100%   11/04/21 1238 76 - 100%   11/02/21 0845 51 - 75%       Wt Readings from Last 10 Encounters:   10/31/21 104.3 kg (230 lb)   08/05/19 95.7 kg (211 lb)   07/18/19 98.9 kg (218 lb)   07/01/19 104.7 kg (230 lb 12.8 oz)   05/23/19 100.7 kg (222 lb)   01/08/19 98.4 kg (217 lb)   10/30/17 102.1 kg (225 lb)   10/05/17 102.1 kg (225 lb)   05/17/15 103.9 kg (229 lb 0.9 oz)       Malnutrition Assessment:  Malnutrition Status:  Insufficient data  - unable to physically assess patient at this time    Estimated Daily Nutrient Needs:  Energy (kcal): 9145-0518 (25-30); Weight Used for Energy Requirements: Current  Protein (g): 104-120 (1.0-1.2); Weight Used for Protein Requirements: Current  Fluid (ml/day): 7446-9088; Method Used for Fluid Requirements: 1 ml/kcal    Nutrition Related Findings:    ABD: WNL with active bowel sounds 11/5  Last BM: 11/5, loose  Edema: Trace LLE; +3 RLE noted 11/3    Nutr.  Labs:  Lab Results Component Value Date/Time    GFR est AA >60 11/05/2021 03:00 AM    GFR est non-AA >60 11/05/2021 03:00 AM    Creatinine (POC) 1.0 05/24/2019 12:45 AM    Creatinine 0.84 11/05/2021 03:00 AM    BUN 14 11/05/2021 03:00 AM    BUN (POC) 25 (H) 05/24/2019 12:45 AM    Sodium (POC) 138 05/24/2019 12:45 AM    Sodium 132 (L) 11/05/2021 03:00 AM    Potassium 4.5 11/05/2021 03:00 AM    Potassium (POC) 4.2 05/24/2019 12:45 AM    Chloride (POC) 100 05/24/2019 12:45 AM    Chloride 98 11/05/2021 03:00 AM    CO2 29 11/05/2021 03:00 AM     Lab Results   Component Value Date/Time    Glucose 193 (H) 11/05/2021 03:00 AM    Glucose (POC) 168 (H) 11/05/2021 10:51 AM     Lab Results   Component Value Date/Time    Hemoglobin A1c 8.2 (H) 10/31/2021 09:43 PM     Nutr. Meds:  Lipitor  Lantus  Humalog  Risaquad  Miralax PRN    Wounds:    Surgical incision       Current Nutrition Therapies:  No diet orders on file    Anthropometric Measures:  · Height:  5' 10.98\" (180.3 cm)  · Current Body Wt:  104.3 kg (230 lb)   · Ideal Body Wt:  172 lbs:  133.7 %   · Body mass index is 32.09 kg/m². · BMI Category:  Obese class 1 (BMI 30.0-34. 9)       Nutrition Diagnosis:   · Increased nutrient needs related to acute injury/trauma as evidenced by  (s/p surgery)    Nutrition Interventions:   Food and/or Nutrient Delivery: Start oral diet, Start oral nutrition supplement  Nutrition Education and Counseling: No recommendations at this time  Coordination of Nutrition Care: Continue to monitor while inpatient, Interdisciplinary rounds    Goals:  PO intake continues >/= 75% of all meals and supplements within 5 - 7 days       Nutrition Monitoring and Evaluation:   Behavioral-Environmental Outcomes: None identified  Food/Nutrient Intake Outcomes: Food and nutrient intake, Supplement intake  Physical Signs/Symptoms Outcomes: Biochemical data, Weight    Discharge Planning:     Too soon to determine     Electronically signed by Prabhakar King RD on 77/2/6953  Contact: [phone] or via Placed

## 2021-11-05 NOTE — PROGRESS NOTES
[facility] Progress Note           Johnna Sommer DO    [phone] Office  [phone]  Fax    2021      Assessment & Plan:   1. Right diabetic foot infection with osteomyelitis, abscess, and gangrene. Status post I&D with first metatarsal and great toe amputation 2021. Surgical cultures growing CoNS, diphtheroids, Proteus, and Enterococcus. Per pathology report margins are NOT clear. Patient to go back to the OR today. Continue Unasyn. Discharge antibiotic plan to be determined  2. Diabetes mellitus. Hemoglobin A1c 8.2  3. CKD. Creatinine stable   4. History of von Willebrand's disease            Subjective:     No complaints today. Tolerating Unasyn    Objective:     Vitals:   Visit Vitals  /70 (BP 1 Location: Left upper arm, BP Patient Position: At rest)   Pulse 80   Temp 100.1 °F (37.8 °C)   Resp 18   Ht 5' 11\" (1.803 m)   Wt 230 lb (104.3 kg)   SpO2 92%   BMI 32.08 kg/m²        Tmax:  Temp (24hrs), Av.1 °F (37.3 °C), Min:98 °F (36.7 °C), Max:100.1 °F (37.8 °C)      Exam:   Patient is intubated:  no    Physical Examination:   General:   No complaints   Head:  Normocephalic, atraumatic. Eyes:  Conjunctivae clear   Neck: Supple       Lungs:   No distress. Chest wall:     Heart:     Abdomen:   non-distended   Extremities: Moves all. Foot dressed   Skin:  No rash   Neurologic:  No focal deficit     Labs:        No lab exists for component: ITNL   No results for input(s): CPK, CKMB, TROIQ in the last 72 hours. Recent Labs     21  0300 21  0559 21  0012   * 134* 133*   K 4.5 4.3 4.6   CL 98 102 104   CO2 29 26 22   BUN 14 11 13   CREA 0.84 0.87 0.94   * 263* 266*   PHOS 2.3* 2.4* 2.5*   MG 1.7 1.8 1.5*   ALB 2.3* 2.0* 2.0*   WBC 9.5 7.6 7.5   HGB 11.6* 10.7* 9.5*   HCT 35.6* 32.4* 28.7*    228 194     No results for input(s): INR, PTP, APTT, INREXT, INREXT in the last 72 hours.   Needs: urine analysis, urine sodium, protein and creatinine  Lab Results   Component Value Date/Time    Sodium,urine random 76 11/01/2021 05:08 AM         Cultures:     No results found for: SDES  Lab Results   Component Value Date/Time    Culture result: (A) 11/01/2021 08:28 PM     LIGHT ENTEROCOCCUS FAECALIS REFER TO [id number] FOR SUSCEPTIBILITIES    Culture result: (A) 11/01/2021 08:28 PM     LIGHT STAPHYLOCOCCUS SPECIES, COAGULASE NEGATIVE 2 COLONY TYPES/STRAINS    Culture result: LIGHT DIPHTHEROIDS (A) 11/01/2021 08:28 PM    Culture result: NO ANAEROBES ISOLATED 11/01/2021 08:28 PM       Radiology:     Medications       Current Facility-Administered Medications   Medication Dose Route Frequency Last Admin    ampicillin-sulbactam (UNASYN) 3 g in 0.9% sodium chloride (MBP/ADV) 100 mL MBP  3 g IntraVENous Q6H 3 g at 11/05/21 0608    labetaloL (NORMODYNE;TRANDATE) 20 mg/4 mL (5 mg/mL) injection 10 mg  10 mg IntraVENous Q4H PRN      valsartan (DIOVAN) tablet 160 mg  160 mg Oral DAILY      insulin glargine (LANTUS) injection 25 Units  25 Units SubCUTAneous QHS 25 Units at 11/04/21 2114    L.acidophilus-paracasei-S.thermophil-bifidobacter (RISAQUAD) 8 billion cell capsule  1 Capsule Oral DAILY 1 Capsule at 11/04/21 0910    enoxaparin (LOVENOX) injection 40 mg  40 mg SubCUTAneous Q24H 40 mg at 11/04/21 0512    [Held by provider] aspirin delayed-release tablet 81 mg  81 mg Oral DAILY      atorvastatin (LIPITOR) tablet 20 mg  20 mg Oral QHS 20 mg at 11/04/21 2112    diazePAM (VALIUM) tablet 5 mg  5 mg Oral QHS PRN 5 mg at 11/03/21 2259    sodium chloride (NS) flush 5-40 mL  5-40 mL IntraVENous Q8H 10 mL at 11/04/21 2333    sodium chloride (NS) flush 5-40 mL  5-40 mL IntraVENous PRN      acetaminophen (TYLENOL) tablet 650 mg  650 mg Oral Q6H  mg at 11/05/21 6550    Or    acetaminophen (TYLENOL) suppository 650 mg  650 mg Rectal Q6H PRN      polyethylene glycol (MIRALAX) packet 17 g  17 g Oral DAILY PRN      ondansetron (ZOFRAN ODT) tablet 4 mg  4 mg Oral Q8H PRN      Or    ondansetron (ZOFRAN) injection 4 mg  4 mg IntraVENous Q6H PRN      HYDROmorphone (DILAUDID) injection 1 mg  1 mg IntraVENous Q6H PRN 1 mg at 11/05/21 0346    insulin lispro (HUMALOG) injection   SubCUTAneous AC&HS 3 Units at 11/04/21 2113    glucose chewable tablet 16 g  4 Tablet Oral PRN      dextrose (D50W) injection syrg 12.5-25 g  12.5-25 g IntraVENous PRN      glucagon (GLUCAGEN) injection 1 mg  1 mg IntraMUSCular PRN             Case discussed with:      Raeann Davis DO

## 2021-11-05 NOTE — PERIOP NOTES
TRANSFER - OUT REPORT:    Verbal report given to Ike Long RN on Sana Spore  being transferred to  for routine post - op       Report consisted of patients Situation, Background, Assessment and   Recommendations(SBAR). Information from the following report(s) SBAR was reviewed with the receiving nurse. Opportunity for questions and clarification was provided.       Patient transported with:   O2 @ 0 liters

## 2021-11-05 NOTE — PROGRESS NOTES
The [address] [place] [address] Podiatric Surgery  Post-Op Note    Subjective:   Admit Date: 10/31/2021  OR Date: 11/1/2021  Post-op: 3 Days Post-Op  Status Post:   Right foot first ray amputation  Right foot I&D, multiple subfascial spaces    Liz Mendenhall  appears; alert, well appearing, and in no distress  Pain control is: excellent    Objective[de-identified]    height is 5' 11\" (1.803 m) and weight is 104.3 kg (230 lb). His temperature is 99.7 °F (37.6 °C). His blood pressure is 168/84 (abnormal) and his pulse is 87. His respiration is 18 and oxygen saturation is 94%. Intake/Output Summary (Last 24 hours) at 11/4/2021 2232  Last data filed at 11/4/2021 1802  Gross per 24 hour   Intake 1260 ml   Output 1700 ml   Net -440 ml       Physical Exam:  Incision: open wound with exposed bone, retention sutures right foot first ray status post open amputation. No purulence.  Increasing granulation tissue  Abdomen: soft, nontender, nondistended, no masses or organomegaly  DVT Exam:No evidence of DVT seen on physical exam.    Labs:  Lab Results   Component Value Date/Time    WBC 7.6 11/04/2021 05:59 AM    HGB 10.7 (L) 11/04/2021 05:59 AM    HCT 32.4 (L) 11/04/2021 05:59 AM    MCV 97.9 11/04/2021 05:59 AM    PLATELET 890 90/99/9532 05:59 AM     Lab Results   Component Value Date/Time    Sodium 134 (L) 11/04/2021 05:59 AM    Potassium 4.3 11/04/2021 05:59 AM    Chloride 102 11/04/2021 05:59 AM    CO2 26 11/04/2021 05:59 AM    Phosphorus 2.4 (L) 11/04/2021 05:59 AM    BUN 11 11/04/2021 05:59 AM    Calcium 8.5 11/04/2021 05:59 AM      Lab Results   Component Value Date/Time    Glucose 263 (H) 11/04/2021 05:59 AM       Impression/Plan:   Principal Problem:    Diabetic foot ulcer ([address] (10/31/2021)    Active Problems:    Arthritis ()      CAD (coronary artery disease) (3/2018)      Overview: CABG, MI      DM type 2 causing vascular disease ([facility] ()      Prostate cancer ([facility] ()      Von Willebrand disease ([facility] ()      Anxiety and depression ()      Chronic pain ()      Overview: LEFT KNEE      Hyponatremia (11/1/2021)      CKD (chronic kidney disease), stage III (HCC) ()      DM type 2 causing renal disease (HCC) ()      1.  Status post right 1st ray amputation  2. Status post right foot I&D  3. Open wound with exposed bone right foot    Plan:   Reviewed pathology. Findings confirm osteomyelitis with margin positive. Discussed and recommended repeat I&D, delayed primary closure with additional bone resection. I discussed procedures at length with the patient including the expected post operative course, risks, alternatives and potential complications. No guarantees given. Patient has elected to proceed. NPO at midnight. Consent ordered. Plan to proceed tomorrow at 11:15am.    Kala Mendenhall.  JOSTIN Rosa FACFAS FACCWS [facility]  Triple Board Certified Foot & Ankle Specialist  [phone] cell  November 4, 2021  10:32 PM

## 2021-11-05 NOTE — BRIEF OP NOTE
Brief Postoperative Note    Patient: Myrtle Gonzalez  YOB: 1950  MRN: [id number]    Date of Procedure: 2021     Pre-Op Diagnosis: OPEN WOUND    Post-Op Diagnosis: Same as preoperative diagnosis. Procedure(s):  RIGHT FOOT DELAYED PRIMARY CLOSURE    Surgeon(s):  Yaneli Rosa DPM    Surgical Assistant: Surg Asst-1: Micah Martin I    Anesthesia: MAC     Estimated Blood Loss (mL): less than 50     Complications: None    Specimens: * No specimens in log *     Implants: * No implants in log *    Drains: * No LDAs found *    Findings: open wound with bleeding bone noted.     Electronically Signed by Gerry Esquivel DPM on 2021 at 12:29 PM

## 2021-11-05 NOTE — OP NOTES
Operative Report    Patient: Cristian Toro MRN: [id number]  SSN: [id number]    YOB: 1950  Age: 66 y.o. Sex: male       Date of Surgery: 11/5/2021     Preoperative Diagnosis:   OPEN WOUND RIGHT FOOT STATUS POST OPEN AMPUTATION, PLANNED STAGED DELAYED PRIMARY CLOSURE OF COMPLICATED WOUND    Postoperative Diagnosis:   OPEN WOUND RIGHT FOOT STATUS POST OPEN AMPUTATION, PLANNED STAGED DELAYED PRIMARY CLOSURE OF COMPLICATED WOUND     Surgeon(s) and Role:     * Rome Rosa, IRMAM - Primary    Anesthesia: MAC     Procedure:   RIGHT FOOT DELAYED PRIMARY CLOSURE OF OPEN WOUND, STAGED    Justification of Procedure:Patient is a 13-year-old diabetic male who recently had open first ray amputation to the right foot due to gangrene and osteomyelitis. This was left open due to extensive purulence noted to the wound and deepened the tissues. The infection was allowed to decompress with dressing changes allowing the wound to drain. The wound is now deemed clean and ready for delayed primary closure as was discussed with patient is part of a staged approach for limb salvage. I discussed the procedure at length including the expected postoperative course, risks, alternatives, and possible complications. No guarantees were given and the patient has elected to proceed. Procedure in Detail: Patient is a 13-year-old diabetic male was brought back to the operating room and placed supine on the operating room table. After adequate IV sedation a local infiltrated block was performed to the right foot first ray. Next the right foot was prepped and draped in usual aseptic fashion. Next attention was directed to the open wound to the right foot where there was increasing granulation tissue and no evidence of necrotic or devitalized tissue. The bone was noted to be prominent at the stump of the first metatarsal which was recontoured using a power saw. This wound was then irrigated with copious amounts are saline. There were three lobes of possible flaps to used to close this defect. The most dorsal flap was removed and the defect was closed using a dorsal medial flap which was advanced laterally and plantarly. The two plantar flaps were brought dorsally to meet this flap and closed the defect completely. This was closed under minimal tension in layers. Postoperative dressing was applied. Patient tolerated procedure and anesthesia well. He will be transferred to the PACU for postoperative monitoring. From there he will be transferred to the floor for further medical and surgical management. Estimated Blood Loss:  Less than 50mL    Tourniquet Time: * No tourniquets in log *      Implants: * No implants in log *            Specimens: * No specimens in log *        Drains: None                Complications: None    Counts: Sponge and needle counts were correct times two.     Signed By:  Michael Mulligan DPM     November 5, 2021

## 2021-11-05 NOTE — PROGRESS NOTES
Bedside, Verbal and Written shift change report given to Jameel Bradshaw RN (oncoming nurse) by Josue Rush (offgoing nurse). Report included the following information SBAR, Kardex, ED Summary, Procedure Summary, Intake/Output, MAR, Recent Results and Med Rec Status.

## 2021-11-05 NOTE — ANESTHESIA POSTPROCEDURE EVALUATION
Procedure(s):  RIGHT FOOT DELAYED PRIMARY CLOSURE. MAC    Anesthesia Post Evaluation        Patient location during evaluation: PACU  Level of consciousness: awake  Pain management: adequate  Airway patency: patent  Anesthetic complications: no  Cardiovascular status: acceptable  Respiratory status: acceptable  Hydration status: acceptable  Post anesthesia nausea and vomiting:  none      INITIAL Post-op Vital signs:   Vitals Value Taken Time   /62 11/05/21 1300   Temp 36.3 °C (97.4 °F) 11/05/21 1239   Pulse 87 11/05/21 1303   Resp 20 11/05/21 1303   SpO2 97 % 11/05/21 1303   Vitals shown include unvalidated device data.

## 2021-11-05 NOTE — PROGRESS NOTES
11/5/2021   CARE MANAGEMENT NOTE:  CM reviewed EMR. Pt was admitted with right diabetic foot infection with osteo, abscess, and gangrene. Reportedly, pt resides alone in a ground floor apt located in [place]. Jeanie Hamlin (c [phone]) is the decision maker.     RUR 9%     Transition Plan of Care:  1.  Ortho, ID, Podiatry following for medical management - pt is s/p I&D of right foot and great toe amputation, first metatarsal amp on 11/1. Also s/p right foot delayed primary closure on 11/5.  2. [employer] is for pt to return home; will monitor need for IV ABX vs p.o  3.  Outpt f/u  4.  Pt will arrange his own transportation home     CM will continue to follow pt until discharged.   Bere

## 2021-11-06 LAB
BACTERIA SPEC CULT: NORMAL
GLUCOSE BLD STRIP.AUTO-MCNC: 207 MG/DL (ref 65–117)
GLUCOSE BLD STRIP.AUTO-MCNC: 227 MG/DL (ref 65–117)
GLUCOSE BLD STRIP.AUTO-MCNC: 249 MG/DL (ref 65–117)
GLUCOSE BLD STRIP.AUTO-MCNC: 278 MG/DL (ref 65–117)
SERVICE CMNT-IMP: ABNORMAL
SERVICE CMNT-IMP: NORMAL

## 2021-11-06 PROCEDURE — 74011250637 HC RX REV CODE- 250/637: Performed by: INTERNAL MEDICINE

## 2021-11-06 PROCEDURE — 94760 N-INVAS EAR/PLS OXIMETRY 1: CPT

## 2021-11-06 PROCEDURE — 74011000258 HC RX REV CODE- 258: Performed by: INTERNAL MEDICINE

## 2021-11-06 PROCEDURE — 65270000029 HC RM PRIVATE

## 2021-11-06 PROCEDURE — 74011250636 HC RX REV CODE- 250/636: Performed by: INTERNAL MEDICINE

## 2021-11-06 PROCEDURE — 82962 GLUCOSE BLOOD TEST: CPT

## 2021-11-06 PROCEDURE — 74011636637 HC RX REV CODE- 636/637: Performed by: INTERNAL MEDICINE

## 2021-11-06 RX ADMIN — HYDROMORPHONE HYDROCHLORIDE 1 MG: 1 INJECTION, SOLUTION INTRAMUSCULAR; INTRAVENOUS; SUBCUTANEOUS at 05:04

## 2021-11-06 RX ADMIN — HYDROMORPHONE HYDROCHLORIDE 1 MG: 1 INJECTION, SOLUTION INTRAMUSCULAR; INTRAVENOUS; SUBCUTANEOUS at 19:04

## 2021-11-06 RX ADMIN — Medication 10 ML: at 05:04

## 2021-11-06 RX ADMIN — ENOXAPARIN SODIUM 40 MG: 100 INJECTION SUBCUTANEOUS at 05:05

## 2021-11-06 RX ADMIN — Medication 10 ML: at 13:02

## 2021-11-06 RX ADMIN — HYDROMORPHONE HYDROCHLORIDE 1 MG: 1 INJECTION, SOLUTION INTRAMUSCULAR; INTRAVENOUS; SUBCUTANEOUS at 13:02

## 2021-11-06 RX ADMIN — VALSARTAN 160 MG: 80 TABLET, FILM COATED ORAL at 09:06

## 2021-11-06 RX ADMIN — AMPICILLIN AND SULBACTAM 3 G: 2; 1 INJECTION, POWDER, FOR SOLUTION INTRAMUSCULAR; INTRAVENOUS at 17:12

## 2021-11-06 RX ADMIN — INSULIN LISPRO 3 UNITS: 100 INJECTION, SOLUTION INTRAVENOUS; SUBCUTANEOUS at 22:08

## 2021-11-06 RX ADMIN — INSULIN LISPRO 3 UNITS: 100 INJECTION, SOLUTION INTRAVENOUS; SUBCUTANEOUS at 09:06

## 2021-11-06 RX ADMIN — INSULIN GLARGINE 30 UNITS: 100 INJECTION, SOLUTION SUBCUTANEOUS at 22:07

## 2021-11-06 RX ADMIN — INSULIN LISPRO 3 UNITS: 100 INJECTION, SOLUTION INTRAVENOUS; SUBCUTANEOUS at 12:56

## 2021-11-06 RX ADMIN — AMPICILLIN AND SULBACTAM 3 G: 2; 1 INJECTION, POWDER, FOR SOLUTION INTRAMUSCULAR; INTRAVENOUS at 12:57

## 2021-11-06 RX ADMIN — ATORVASTATIN CALCIUM 20 MG: 20 TABLET, FILM COATED ORAL at 22:08

## 2021-11-06 RX ADMIN — Medication 1 CAPSULE: at 09:06

## 2021-11-06 RX ADMIN — INSULIN LISPRO 3 UNITS: 100 INJECTION, SOLUTION INTRAVENOUS; SUBCUTANEOUS at 17:12

## 2021-11-06 RX ADMIN — AMPICILLIN AND SULBACTAM 3 G: 2; 1 INJECTION, POWDER, FOR SOLUTION INTRAMUSCULAR; INTRAVENOUS at 05:04

## 2021-11-06 NOTE — PROGRESS NOTES
The [address] [facility] Podiatric Surgery  Post-Op Note    Subjective:   Admit Date: 10/31/2021  OR Date: 11/5/2021  Post-op: 1 Day Post-Op  Status Post: delayed primary closure of wound right foot    Yetta Mc  appears; alert, well appearing, and in no distress  Pain control is: excellent    Objective[de-identified]    height is 5' 10.98\" (1.803 m) and weight is 104.3 kg (230 lb). His temperature is 100.2 °F (37.9 °C). His blood pressure is 125/72 and his pulse is 85. His respiration is 18 and oxygen saturation is 95%.      Intake/Output Summary (Last 24 hours) at 11/6/2021 1117  Last data filed at 11/6/2021 0500  Gross per 24 hour   Intake 400 ml   Output 1525 ml   Net -1125 ml       Physical Exam:  Incision: dressing intact without strikethrough  Abdomen: soft, nontender, nondistended, no masses or organomegaly  DVT Exam:No evidence of DVT seen on physical exam.    Labs:  Lab Results   Component Value Date/Time    WBC 9.5 11/05/2021 03:00 AM    HGB 11.6 (L) 11/05/2021 03:00 AM    HCT 35.6 (L) 11/05/2021 03:00 AM    MCV 97.8 11/05/2021 03:00 AM    PLATELET 215 68/17/1235 03:00 AM     Lab Results   Component Value Date/Time    Sodium 132 (L) 11/05/2021 03:00 AM    Potassium 4.5 11/05/2021 03:00 AM    Chloride 98 11/05/2021 03:00 AM    CO2 29 11/05/2021 03:00 AM    Phosphorus 2.3 (L) 11/05/2021 03:00 AM    BUN 14 11/05/2021 03:00 AM    Calcium 8.9 11/05/2021 03:00 AM      Lab Results   Component Value Date/Time    Glucose 193 (H) 11/05/2021 03:00 AM       Impression/Plan:   Principal Problem:    Diabetic foot ulcer ([facility] (10/31/2021)    Active Problems:    Arthritis ()      CAD (coronary artery disease) (3/2018)      Overview: CABG, MI      DM type 2 causing vascular disease ([facility] ()      Prostate cancer ([facility] ()      Von Willebrand disease ([facility] ()      Anxiety and depression ()      Chronic pain ()      Overview: LEFT KNEE      Hyponatremia (11/1/2021)      CKD (chronic kidney disease), stage III (HCC) ()      DM type 2 causing renal disease ([facility] ()      1.  status post delayed primary closure of wound right foot  Plan:   Dressing intact. Continue nonweightbearing to right foot. Ok to continue anticoagulation. Discharge once cleared by PT, ID, and medicine. Follow up in my office upon discharge next week    Yaneli Rosa DPM FACFAS FACCWS [facility]  Triple Board Certified Foot & Ankle Specialist  [phone] cell  2021  11:17 AM

## 2021-11-06 NOTE — PROGRESS NOTES
Bedside, Verbal and Written shift change report given to Davin Najera (oncoming nurse) by Abilio Lane (offgoing nurse). Report included the following information SBAR, Kardex, ED Summary, Procedure Summary, Intake/Output, MAR, Recent Results and Med Rec Status.

## 2021-11-06 NOTE — PROGRESS NOTES
Jd Swanson [facility] [address]  380 [facility], [address] [place] [address]  [phone]      Medical Progress Note      NAME: Karla Quick   :  1950  MRM:  [id number]    Date of service: 2021  9:53 AM       Assessment and Plan:   1. Diabetic foot ulcer/ osteomyelitis of the tibial hallux sesamoid/ Gas forming cellulitis in the great toe/ Plantar soft tissue ulceration has sinus tract from the ulcer to the tibial hallux sesamoid/ Complete rupture of the FHL tendon, which may be secondarily infected: S/p I&D and great toe / first metatarsal amputation on . Intraoperative culture grew CoNS, diphtheroids, Proteus, and Enterococcus. IV abx deescalated from IV vanc, cefepime and Flagyl to Unasyn. ID following. Pathology report showed acute osteomyelitis extending to involve resection margin. Returned to OR on ; podiatry on board. Anticipate possible need for home IV abx     2. DM type 2 causing renal, vascular and neurological disease: A1c 8.2. Poorly controlled. On Lantus and SSI     3. CAD (coronary artery disease) / Hypertension: BP controlled. Cont ARB and statin.  ASA on hold post op     4.  Von Willebrand disease: Monitor for bleeding. Holding ASA     5. Chronic pain / Arthritis: APAP PRN     6. Anxiety and depression:  Valium PRN     7. Hx of prostate cancer: Outpatient follow up         Subjective:     Chief Complaint[de-identified] Patient was seen and examined as a follow up for OM. Chart was reviewed. denies foot pain     ROS:  (bold if positive, if negative)    Tolerating PT  Tolerating Diet        Objective:     Last 24hrs VS reviewed since prior progress note.  Most recent are:    Visit Vitals  /72 (BP 1 Location: Left upper arm, BP Patient Position: At rest)   Pulse 85   Temp 100.2 °F (37.9 °C)   Resp 18   Ht 5' 10.98\" (1.803 m)   Wt 104.3 kg (230 lb)   SpO2 95%   BMI 32.09 kg/m²     SpO2 Readings from Last 6 Encounters:   21 95%   19 98%   19 100% 05/24/19 97%   01/08/19 98%   10/31/17 95%    O2 Flow Rate (L/min): 2 l/min       Intake/Output Summary (Last 24 hours) at 11/6/2021 0953  Last data filed at 11/6/2021 0500  Gross per 24 hour   Intake 400 ml   Output 1525 ml   Net -1125 ml        Physical Exam:    Gen:  Well-developed, well-nourished, in no acute distress  HEENT:  Pink conjunctivae, PERRL, hearing intact to voice, moist mucous membranes  Neck:  Supple, without masses, thyroid non-tender  Resp:  No accessory muscle use, clear breath sounds without wheezes rales or rhonchi  Card:  No murmurs, normal S1, S2 without thrills, bruits or peripheral edema  Abd:  Soft, non-tender, non-distended, normoactive bowel sounds are present, no palpable organomegaly and no detectable hernias  Lymph:  No cervical or inguinal adenopathy  Musc:  No cyanosis or clubbing  Skin:  No rashes or ulcers, skin turgor is good  Neuro:  Cranial nerves are grossly intact, no focal motor weakness, follows commands appropriately  Psych:  Good insight, oriented to person, place and time, alert  __________________________________________________________________  Medications Reviewed: (see below)  Medications:     Current Facility-Administered Medications   Medication Dose Route Frequency    insulin glargine (LANTUS) injection 30 Units  30 Units SubCUTAneous QHS    ampicillin-sulbactam (UNASYN) 3 g in 0.9% sodium chloride (MBP/ADV) 100 mL MBP  3 g IntraVENous Q6H    labetaloL (NORMODYNE;TRANDATE) 20 mg/4 mL (5 mg/mL) injection 10 mg  10 mg IntraVENous Q4H PRN    valsartan (DIOVAN) tablet 160 mg  160 mg Oral DAILY    L.acidophilus-paracasei-S.thermophil-bifidobacter (RISAQUAD) 8 billion cell capsule  1 Capsule Oral DAILY    enoxaparin (LOVENOX) injection 40 mg  40 mg SubCUTAneous Q24H    [Held by provider] aspirin delayed-release tablet 81 mg  81 mg Oral DAILY    atorvastatin (LIPITOR) tablet 20 mg  20 mg Oral QHS    diazePAM (VALIUM) tablet 5 mg  5 mg Oral QHS PRN    sodium chloride (NS) flush 5-40 mL  5-40 mL IntraVENous Q8H    sodium chloride (NS) flush 5-40 mL  5-40 mL IntraVENous PRN    acetaminophen (TYLENOL) tablet 650 mg  650 mg Oral Q6H PRN    Or    acetaminophen (TYLENOL) suppository 650 mg  650 mg Rectal Q6H PRN    polyethylene glycol (MIRALAX) packet 17 g  17 g Oral DAILY PRN    ondansetron (ZOFRAN ODT) tablet 4 mg  4 mg Oral Q8H PRN    Or    ondansetron (ZOFRAN) injection 4 mg  4 mg IntraVENous Q6H PRN    HYDROmorphone (DILAUDID) injection 1 mg  1 mg IntraVENous Q6H PRN    insulin lispro (HUMALOG) injection   SubCUTAneous AC&HS    glucose chewable tablet 16 g  4 Tablet Oral PRN    dextrose (D50W) injection syrg 12.5-25 g  12.5-25 g IntraVENous PRN    glucagon (GLUCAGEN) injection 1 mg  1 mg IntraMUSCular PRN        Lab Data Reviewed: (see below)  Lab Review:     Recent Labs     11/05/21 0300 11/04/21  0559   WBC 9.5 7.6   HGB 11.6* 10.7*   HCT 35.6* 32.4*    228     Recent Labs     11/05/21 0300 11/04/21  0559   * 134*   K 4.5 4.3   CL 98 102   CO2 29 26   * 263*   BUN 14 11   CREA 0.84 0.87   CA 8.9 8.5   MG 1.7 1.8   PHOS 2.3* 2.4*   ALB 2.3* 2.0*   TBILI 0.6  --    ALT 29  --      Lab Results   Component Value Date/Time    Glucose (POC) 227 (H) 11/06/2021 06:54 AM    Glucose (POC) 329 (H) 11/05/2021 09:02 PM    Glucose (POC) 226 (H) 11/05/2021 04:44 PM    Glucose (POC) 182 (H) 11/05/2021 12:46 PM    Glucose (POC) 168 (H) 11/05/2021 10:51 AM     No results for input(s): PH, PCO2, PO2, HCO3, FIO2 in the last 72 hours. No results for input(s): INR, INREXT in the last 72 hours.   All Micro Results     Procedure Component Value Units Date/Time    CULTURE, BLOOD [[id number]] Collected: 10/31/21 2143    Order Status: Completed Specimen: Blood Updated: 11/06/21 0557     Special Requests: NO SPECIAL REQUESTS        Culture result: NO GROWTH 6 DAYS       CULTURE, ANAEROBIC [[id number]] Collected: 11/01/21 2028    Order Status: Completed Specimen: Foot Updated: 11/04/21 1214     Special Requests: NO SPECIAL REQUESTS        Culture result: NO ANAEROBES ISOLATED       CULTURE, BODY FLUID Jose Loupe STAIN [[id number]]  (Abnormal) Collected: 11/01/21 2028    Order Status: Completed Specimen: Foot Updated: 11/04/21 1209     Special Requests: NO SPECIAL REQUESTS        GRAM STAIN OCCASIONAL WBCS SEEN         NO ORGANISMS SEEN        Culture result:       LIGHT ENTEROCOCCUS FAECALIS REFER TO [id number] FOR SUSCEPTIBILITIES                  LIGHT STAPHYLOCOCCUS SPECIES, COAGULASE NEGATIVE 2 COLONY TYPES/STRAINS            LIGHT DIPHTHEROIDS       CULTURE, WOUND Jose Loupe STAIN [[id number]]  (Abnormal)  (Susceptibility) Collected: 11/01/21 0141    Order Status: Completed Specimen: Foot Updated: 11/04/21 0945     Special Requests: NO SPECIAL REQUESTS        GRAM STAIN OCCASIONAL WBCS SEEN         FEW GRAM POSITIVE COCCI        Culture result:       LIGHT ENTEROCOCCUS FAECALIS            SCANT PROTEUS MIRABILIS               LIGHT MIXED SKIN SANG ISOLATED          COVID-19 RAPID TEST [[id number]] Collected: 11/01/21 1456    Order Status: Completed Specimen: Nasopharyngeal Updated: 11/01/21 1536     Specimen source Nasopharyngeal        COVID-19 rapid test Not detected        Comment: Rapid Abbott ID Now       Rapid NAAT:  The specimen is NEGATIVE for SARS-CoV-2, the novel coronavirus associated with COVID-19. Negative results should be treated as presumptive and, if inconsistent with clinical signs and symptoms or necessary for patient management, should be tested with an alternative molecular assay. Negative results do not preclude SARS-CoV-2 infection and should not be used as the sole basis for patient management decisions. This test has been authorized by the FDA under an Emergency Use Authorization (EUA) for use by authorized laboratories.    Fact sheet for Healthcare Providers: ConventionUpdate.co.nz  Fact sheet for Patients: [facility]       Methodology: Isothermal Nucleic Acid Amplification               I have reviewed notes of prior 24hr. Other pertinent lab: Total time spent with patient: 28 I personally reviewed chart, notes, data and current medications in the medical record. I have personally examined and treated the patient at bedside during this period.                  Care Plan discussed with: Patient, Nursing Staff and >50% of time spent in counseling and coordination of care    Discussed:  Care Plan and D/C Planning    Prophylaxis:  Lovenox    Disposition:  Home w/Family           ___________________________________________________    Attending Physician: Christa Cardozo MD

## 2021-11-07 LAB
GLUCOSE BLD STRIP.AUTO-MCNC: 223 MG/DL (ref 65–117)
GLUCOSE BLD STRIP.AUTO-MCNC: 244 MG/DL (ref 65–117)
GLUCOSE BLD STRIP.AUTO-MCNC: 253 MG/DL (ref 65–117)
GLUCOSE BLD STRIP.AUTO-MCNC: 254 MG/DL (ref 65–117)
SERVICE CMNT-IMP: ABNORMAL

## 2021-11-07 PROCEDURE — 74011636637 HC RX REV CODE- 636/637: Performed by: INTERNAL MEDICINE

## 2021-11-07 PROCEDURE — 74011000258 HC RX REV CODE- 258: Performed by: INTERNAL MEDICINE

## 2021-11-07 PROCEDURE — 74011250636 HC RX REV CODE- 250/636: Performed by: INTERNAL MEDICINE

## 2021-11-07 PROCEDURE — 94760 N-INVAS EAR/PLS OXIMETRY 1: CPT

## 2021-11-07 PROCEDURE — 82962 GLUCOSE BLOOD TEST: CPT

## 2021-11-07 PROCEDURE — 74011250637 HC RX REV CODE- 250/637: Performed by: INTERNAL MEDICINE

## 2021-11-07 PROCEDURE — 65270000029 HC RM PRIVATE

## 2021-11-07 RX ADMIN — Medication 10 ML: at 08:12

## 2021-11-07 RX ADMIN — AMPICILLIN AND SULBACTAM 3 G: 2; 1 INJECTION, POWDER, FOR SOLUTION INTRAMUSCULAR; INTRAVENOUS at 11:41

## 2021-11-07 RX ADMIN — INSULIN GLARGINE 30 UNITS: 100 INJECTION, SOLUTION SUBCUTANEOUS at 22:02

## 2021-11-07 RX ADMIN — VALSARTAN 160 MG: 80 TABLET, FILM COATED ORAL at 08:11

## 2021-11-07 RX ADMIN — Medication 10 ML: at 22:00

## 2021-11-07 RX ADMIN — HYDROMORPHONE HYDROCHLORIDE 1 MG: 1 INJECTION, SOLUTION INTRAMUSCULAR; INTRAVENOUS; SUBCUTANEOUS at 18:59

## 2021-11-07 RX ADMIN — INSULIN LISPRO 5 UNITS: 100 INJECTION, SOLUTION INTRAVENOUS; SUBCUTANEOUS at 11:40

## 2021-11-07 RX ADMIN — INSULIN LISPRO 3 UNITS: 100 INJECTION, SOLUTION INTRAVENOUS; SUBCUTANEOUS at 08:11

## 2021-11-07 RX ADMIN — ENOXAPARIN SODIUM 40 MG: 100 INJECTION SUBCUTANEOUS at 07:13

## 2021-11-07 RX ADMIN — INSULIN LISPRO 3 UNITS: 100 INJECTION, SOLUTION INTRAVENOUS; SUBCUTANEOUS at 16:48

## 2021-11-07 RX ADMIN — AMPICILLIN AND SULBACTAM 3 G: 2; 1 INJECTION, POWDER, FOR SOLUTION INTRAMUSCULAR; INTRAVENOUS at 18:59

## 2021-11-07 RX ADMIN — INSULIN LISPRO 3 UNITS: 100 INJECTION, SOLUTION INTRAVENOUS; SUBCUTANEOUS at 22:02

## 2021-11-07 RX ADMIN — Medication 1 CAPSULE: at 08:11

## 2021-11-07 RX ADMIN — HYDROMORPHONE HYDROCHLORIDE 1 MG: 1 INJECTION, SOLUTION INTRAMUSCULAR; INTRAVENOUS; SUBCUTANEOUS at 13:53

## 2021-11-07 RX ADMIN — AMPICILLIN AND SULBACTAM 3 G: 2; 1 INJECTION, POWDER, FOR SOLUTION INTRAMUSCULAR; INTRAVENOUS at 01:34

## 2021-11-07 RX ADMIN — HYDROMORPHONE HYDROCHLORIDE 1 MG: 1 INJECTION, SOLUTION INTRAMUSCULAR; INTRAVENOUS; SUBCUTANEOUS at 01:34

## 2021-11-07 RX ADMIN — AMPICILLIN AND SULBACTAM 3 G: 2; 1 INJECTION, POWDER, FOR SOLUTION INTRAMUSCULAR; INTRAVENOUS at 07:13

## 2021-11-07 RX ADMIN — ACETAMINOPHEN 650 MG: 325 TABLET ORAL at 22:02

## 2021-11-07 RX ADMIN — HYDROMORPHONE HYDROCHLORIDE 1 MG: 1 INJECTION, SOLUTION INTRAMUSCULAR; INTRAVENOUS; SUBCUTANEOUS at 07:13

## 2021-11-07 RX ADMIN — ATORVASTATIN CALCIUM 20 MG: 20 TABLET, FILM COATED ORAL at 22:02

## 2021-11-07 RX ADMIN — Medication 10 ML: at 16:48

## 2021-11-07 NOTE — PROGRESS NOTES
0700 Shift report received from Derrell calloway RN.       0800 In to check on pt. Pt watching tv in bed. Pt ate breakfast. Pt reports pain controlled. No other complaints or requests at this time. 1345 Pt requesting pain medication. Pt external catheter leaking. Changed linens and catheter. 1700 Pt with family friend at bedside. Friend assisting with hygiene. 1830 Ambulated to bedside commode and returned to bed. Pain medication administered and abx started. 1900 Bedside shift change report given to [facility] (oncoming nurse) by Edmond Olivia (offgoing nurse). Report included the following information SBAR, Kardex, Intake/Output and MAR.

## 2021-11-07 NOTE — PROGRESS NOTES
Problem: Falls - Risk of  Goal: *Absence of Falls  Description: Document Juan Freeze Fall Risk and appropriate interventions in the flowsheet.   Outcome: Progressing Towards Goal  Note: Fall Risk Interventions:  Mobility Interventions: Patient to call before getting OOB         Medication Interventions: Patient to call before getting OOB, Teach patient to arise slowly    Elimination Interventions: Call light in reach    History of Falls Interventions: Bed/chair exit alarm         Problem: Pain  Goal: *Control of Pain  Outcome: Progressing Towards Goal

## 2021-11-07 NOTE — PROGRESS NOTES
Problem: Falls - Risk of  Goal: *Absence of Falls  Description: Document [place] Flow Fall Risk and appropriate interventions in the flowsheet. Outcome: Progressing Towards Goal  Note: Fall Risk Interventions:  Mobility Interventions: Bed/chair exit alarm, Patient to call before getting OOB, Utilize walker, cane, or other assistive device, Utilize gait belt for transfers/ambulation         Medication Interventions: Bed/chair exit alarm, Patient to call before getting OOB, Teach patient to arise slowly, Utilize gait belt for transfers/ambulation    Elimination Interventions: Call light in reach, Stay With Me (per policy), Patient to call for help with toileting needs    History of Falls Interventions: Bed/chair exit alarm         Problem: Patient Education: Go to Patient Education Activity  Goal: Patient/Family Education  Outcome: Progressing Towards Goal     Problem: Pain  Goal: *Control of Pain  Outcome: Progressing Towards Goal  Goal: *PALLIATIVE CARE:  Alleviation of Pain  Outcome: Progressing Towards Goal     Problem: Patient Education: Go to Patient Education Activity  Goal: Patient/Family Education  Outcome: Progressing Towards Goal     Problem: Diabetes Self-Management  Goal: *Disease process and treatment process  Description: Define diabetes and identify own type of diabetes; list 3 options for treating diabetes. Outcome: Progressing Towards Goal  Goal: *Incorporating nutritional management into lifestyle  Description: Describe effect of type, amount and timing of food on blood glucose; list 3 methods for planning meals. Outcome: Progressing Towards Goal  Goal: *Incorporating physical activity into lifestyle  Description: State effect of exercise on blood glucose levels. Outcome: Progressing Towards Goal  Goal: *Developing strategies to promote health/change behavior  Description: Define the ABC's of diabetes; identify appropriate screenings, schedule and personal plan for screenings.   Outcome: Progressing Towards Goal  Goal: *Using medications safely  Description: State effect of diabetes medications on diabetes; name diabetes medication taking, action and side effects. Outcome: Progressing Towards Goal  Goal: *Monitoring blood glucose, interpreting and using results  Description: Identify recommended blood glucose targets  and personal targets. Outcome: Progressing Towards Goal  Goal: *Prevention, detection, treatment of acute complications  Description: List symptoms of hyper- and hypoglycemia; describe how to treat low blood sugar and actions for lowering  high blood glucose level. Outcome: Progressing Towards Goal  Goal: *Prevention, detection and treatment of chronic complications  Description: Define the natural course of diabetes and describe the relationship of blood glucose levels to long term complications of diabetes. Outcome: Progressing Towards Goal  Goal: *Developing strategies to address psychosocial issues  Description: Describe feelings about living with diabetes; identify support needed and support network  Outcome: Progressing Towards Goal  Goal: *Insulin pump training  Outcome: Progressing Towards Goal  Goal: *Sick day guidelines  Outcome: Progressing Towards Goal  Goal: *Patient Specific Goal (EDIT GOAL, INSERT TEXT)  Outcome: Progressing Towards Goal     Problem: Patient Education: Go to Patient Education Activity  Goal: Patient/Family Education  Outcome: Progressing Towards Goal     Problem: Patient Education: Go to Patient Education Activity  Goal: Patient/Family Education  Outcome: Progressing Towards Goal     Problem: Patient Education: Go to Patient Education Activity  Goal: Patient/Family Education  Outcome: Progressing Towards Goal     Problem: Pressure Injury - Risk of  Goal: *Prevention of pressure injury  Description: Document Benjamin Scale and appropriate interventions in the flowsheet.   Outcome: Progressing Towards Goal     Problem: Patient Education: Go to Patient Education Activity  Goal: Patient/Family Education  Outcome: Progressing Towards Goal     Problem: Nutrition Deficit  Goal: *Optimize nutritional status  Outcome: Progressing Towards Goal

## 2021-11-07 NOTE — PROGRESS NOTES
Jd Sky [facility] [address]  [address] [place] [address], [address] [place] [address]  [phone]      Medical Progress Note      NAME: Cristian Toro   :  1950  MRM:  [id number]    Date of service: 2021  9:53 AM       Assessment and Plan:   1. Diabetic foot ulcer/ osteomyelitis of the tibial hallux sesamoid/ Gas forming cellulitis in the great toe/ Plantar soft tissue ulceration has sinus tract from the ulcer to the tibial hallux sesamoid/ Complete rupture of the FHL tendon, which may be secondarily infected: S/p I&D and great toe / first metatarsal amputation on . Intraoperative culture grew CoNS, diphtheroids, Proteus, and Enterococcus. IV abx deescalated from IV vanc, cefepime and Flagyl to Unasyn. ID following. Pathology report showed acute osteomyelitis extending to involve resection margin. Returned to OR on ; podiatry on board. Per Dr Soria List, pt can be discharged.      2.  DM type 2 causing renal, vascular and neurological disease: A1c 8.2. Poorly controlled. On Lantus and SSI     3. CAD (coronary artery disease) / Hypertension: BP controlled. Cont ARB and statin.  ASA on hold post op     4.  Von Willebrand disease: Monitor for bleeding. Holding ASA     5. Chronic pain / Arthritis: APAP PRN     6. Anxiety and depression:  Valium PRN     7. Hx of prostate cancer: Outpatient follow up         Subjective:     Chief Complaint[de-identified] Patient was seen and examined as a follow up for OM. Chart was reviewed. feels well. ROS:  (bold if positive, if negative)    Tolerating PT  Tolerating Diet        Objective:     Last 24hrs VS reviewed since prior progress note.  Most recent are:    Visit Vitals  /63 (BP 1 Location: Left upper arm, BP Patient Position: At rest)   Pulse 71   Temp 99 °F (37.2 °C)   Resp 19   Ht 5' 10.98\" (1.803 m)   Wt 104.3 kg (230 lb)   SpO2 94%   BMI 32.09 kg/m²     SpO2 Readings from Last 6 Encounters:   21 94%   19 98%   19 100%   19 97% 01/08/19 98%   10/31/17 95%    O2 Flow Rate (L/min): 2 l/min       Intake/Output Summary (Last 24 hours) at 11/7/2021 0940  Last data filed at 11/6/2021 2322  Gross per 24 hour   Intake    Output 1250 ml   Net -1250 ml        Physical Exam:    Gen:  Well-developed, well-nourished, in no acute distress  HEENT:  Pink conjunctivae, PERRL, hearing intact to voice, moist mucous membranes  Neck:  Supple, without masses, thyroid non-tender  Resp:  No accessory muscle use, clear breath sounds without wheezes rales or rhonchi  Card:  No murmurs, normal S1, S2 without thrills, bruits or peripheral edema  Abd:  Soft, non-tender, non-distended, normoactive bowel sounds are present, no palpable organomegaly and no detectable hernias  Lymph:  No cervical or inguinal adenopathy  Musc:  No cyanosis or clubbing  Skin:  No rashes or ulcers, skin turgor is good  Neuro:  Cranial nerves are grossly intact, no focal motor weakness, follows commands appropriately  Psych:  Good insight, oriented to person, place and time, alert  __________________________________________________________________  Medications Reviewed: (see below)  Medications:     Current Facility-Administered Medications   Medication Dose Route Frequency    insulin glargine (LANTUS) injection 30 Units  30 Units SubCUTAneous QHS    ampicillin-sulbactam (UNASYN) 3 g in 0.9% sodium chloride (MBP/ADV) 100 mL MBP  3 g IntraVENous Q6H    labetaloL (NORMODYNE;TRANDATE) 20 mg/4 mL (5 mg/mL) injection 10 mg  10 mg IntraVENous Q4H PRN    valsartan (DIOVAN) tablet 160 mg  160 mg Oral DAILY    L.acidophilus-paracasei-S.thermophil-bifidobacter (RISAQUAD) 8 billion cell capsule  1 Capsule Oral DAILY    enoxaparin (LOVENOX) injection 40 mg  40 mg SubCUTAneous Q24H    [Held by provider] aspirin delayed-release tablet 81 mg  81 mg Oral DAILY    atorvastatin (LIPITOR) tablet 20 mg  20 mg Oral QHS    diazePAM (VALIUM) tablet 5 mg  5 mg Oral QHS PRN    sodium chloride (NS) flush 5-40 mL  5-40 mL IntraVENous Q8H    sodium chloride (NS) flush 5-40 mL  5-40 mL IntraVENous PRN    acetaminophen (TYLENOL) tablet 650 mg  650 mg Oral Q6H PRN    Or    acetaminophen (TYLENOL) suppository 650 mg  650 mg Rectal Q6H PRN    polyethylene glycol (MIRALAX) packet 17 g  17 g Oral DAILY PRN    ondansetron (ZOFRAN ODT) tablet 4 mg  4 mg Oral Q8H PRN    Or    ondansetron (ZOFRAN) injection 4 mg  4 mg IntraVENous Q6H PRN    HYDROmorphone (DILAUDID) injection 1 mg  1 mg IntraVENous Q6H PRN    insulin lispro (HUMALOG) injection   SubCUTAneous AC&HS    glucose chewable tablet 16 g  4 Tablet Oral PRN    dextrose (D50W) injection syrg 12.5-25 g  12.5-25 g IntraVENous PRN    glucagon (GLUCAGEN) injection 1 mg  1 mg IntraMUSCular PRN        Lab Data Reviewed: (see below)  Lab Review:     Recent Labs     11/05/21  0300   WBC 9.5   HGB 11.6*   HCT 35.6*        Recent Labs     11/05/21  0300   *   K 4.5   CL 98   CO2 29   *   BUN 14   CREA 0.84   CA 8.9   MG 1.7   PHOS 2.3*   ALB 2.3*   TBILI 0.6   ALT 29     Lab Results   Component Value Date/Time    Glucose (POC) 223 (H) 11/07/2021 06:39 AM    Glucose (POC) 278 (H) 11/06/2021 08:53 PM    Glucose (POC) 249 (H) 11/06/2021 03:56 PM    Glucose (POC) 207 (H) 11/06/2021 10:59 AM    Glucose (POC) 227 (H) 11/06/2021 06:54 AM     No results for input(s): PH, PCO2, PO2, HCO3, FIO2 in the last 72 hours. No results for input(s): INR, INREXT, INREXT in the last 72 hours.   All Micro Results     Procedure Component Value Units Date/Time    CULTURE, BLOOD [[id number]] Collected: 10/31/21 0185    Order Status: Completed Specimen: Blood Updated: 11/06/21 6851     Special Requests: NO SPECIAL REQUESTS        Culture result: NO GROWTH 6 DAYS       CULTURE, ANAEROBIC [[id number]] Collected: 11/01/21 2028    Order Status: Completed Specimen: Foot Updated: 11/04/21 1214     Special Requests: NO SPECIAL REQUESTS        Culture result: NO ANAEROBES ISOLATED CULTURE, BODY FLUID W Andrew Chavez [[id number]]  (Abnormal) Collected: 11/01/21 2028    Order Status: Completed Specimen: Foot Updated: 11/04/21 1209     Special Requests: NO SPECIAL REQUESTS        GRAM STAIN OCCASIONAL WBCS SEEN         NO ORGANISMS SEEN        Culture result:       LIGHT ENTEROCOCCUS FAECALIS REFER TO [id number] FOR SUSCEPTIBILITIES                  LIGHT STAPHYLOCOCCUS SPECIES, COAGULASE NEGATIVE 2 COLONY TYPES/STRAINS            LIGHT DIPHTHEROIDS       CULTURE, WOUND Dannial Hane STAIN [[id number]]  (Abnormal)  (Susceptibility) Collected: 11/01/21 0141    Order Status: Completed Specimen: Foot Updated: 11/04/21 0945     Special Requests: NO SPECIAL REQUESTS        GRAM STAIN OCCASIONAL WBCS SEEN         FEW GRAM POSITIVE COCCI        Culture result:       LIGHT ENTEROCOCCUS FAECALIS            SCANT PROTEUS MIRABILIS               LIGHT MIXED SKIN SANG ISOLATED          COVID-19 RAPID TEST [[id number]] Collected: 11/01/21 1456    Order Status: Completed Specimen: Nasopharyngeal Updated: 11/01/21 1536     Specimen source Nasopharyngeal        COVID-19 rapid test Not detected        Comment: Rapid Abbott ID Now       Rapid NAAT:  The specimen is NEGATIVE for SARS-CoV-2, the novel coronavirus associated with COVID-19. Negative results should be treated as presumptive and, if inconsistent with clinical signs and symptoms or necessary for patient management, should be tested with an alternative molecular assay. Negative results do not preclude SARS-CoV-2 infection and should not be used as the sole basis for patient management decisions. This test has been authorized by the FDA under an Emergency Use Authorization (EUA) for use by authorized laboratories.    Fact sheet for Healthcare Providers: ConventionUpdate.co.nz  Fact sheet for Patients: ConventionUpdate.co.nz       Methodology: Isothermal Nucleic Acid Amplification               I have reviewed notes of prior 24hr. Other pertinent lab: Total time spent with patient: 28 I personally reviewed chart, notes, data and current medications in the medical record. I have personally examined and treated the patient at bedside during this period.                  Care Plan discussed with: Patient, Nursing Staff and >50% of time spent in counseling and coordination of care    Discussed:  Care Plan and D/C Planning    Prophylaxis:  Lovenox    Disposition:  Home w/Family           ___________________________________________________    Attending Physician: Rock Oliver MD

## 2021-11-08 ENCOUNTER — APPOINTMENT (OUTPATIENT)
Dept: GENERAL RADIOLOGY | Age: 71
DRG: 239 | End: 2021-11-08
Attending: INTERNAL MEDICINE
Payer: MEDICARE

## 2021-11-08 LAB
GLUCOSE BLD STRIP.AUTO-MCNC: 198 MG/DL (ref 65–117)
GLUCOSE BLD STRIP.AUTO-MCNC: 210 MG/DL (ref 65–117)
GLUCOSE BLD STRIP.AUTO-MCNC: 225 MG/DL (ref 65–117)
GLUCOSE BLD STRIP.AUTO-MCNC: 229 MG/DL (ref 65–117)
GLUCOSE BLD STRIP.AUTO-MCNC: 357 MG/DL (ref 65–117)
SERVICE CMNT-IMP: ABNORMAL

## 2021-11-08 PROCEDURE — 74011250636 HC RX REV CODE- 250/636: Performed by: INTERNAL MEDICINE

## 2021-11-08 PROCEDURE — 94760 N-INVAS EAR/PLS OXIMETRY 1: CPT

## 2021-11-08 PROCEDURE — 97530 THERAPEUTIC ACTIVITIES: CPT

## 2021-11-08 PROCEDURE — 74011000258 HC RX REV CODE- 258: Performed by: INTERNAL MEDICINE

## 2021-11-08 PROCEDURE — 97535 SELF CARE MNGMENT TRAINING: CPT

## 2021-11-08 PROCEDURE — C1751 CATH, INF, PER/CENT/MIDLINE: HCPCS

## 2021-11-08 PROCEDURE — 74011636637 HC RX REV CODE- 636/637: Performed by: INTERNAL MEDICINE

## 2021-11-08 PROCEDURE — 99232 SBSQ HOSP IP/OBS MODERATE 35: CPT | Performed by: INTERNAL MEDICINE

## 2021-11-08 PROCEDURE — 36573 INSJ PICC RS&I 5 YR+: CPT | Performed by: INTERNAL MEDICINE

## 2021-11-08 PROCEDURE — 76937 US GUIDE VASCULAR ACCESS: CPT

## 2021-11-08 PROCEDURE — 65270000029 HC RM PRIVATE

## 2021-11-08 PROCEDURE — 82962 GLUCOSE BLOOD TEST: CPT

## 2021-11-08 PROCEDURE — 97110 THERAPEUTIC EXERCISES: CPT

## 2021-11-08 PROCEDURE — 74011250637 HC RX REV CODE- 250/637: Performed by: INTERNAL MEDICINE

## 2021-11-08 PROCEDURE — 74011000250 HC RX REV CODE- 250: Performed by: INTERNAL MEDICINE

## 2021-11-08 RX ORDER — INSULIN LISPRO 100 [IU]/ML
18 INJECTION, SOLUTION INTRAVENOUS; SUBCUTANEOUS ONCE
Status: COMPLETED | OUTPATIENT
Start: 2021-11-08 | End: 2021-11-08

## 2021-11-08 RX ADMIN — ENOXAPARIN SODIUM 40 MG: 100 INJECTION SUBCUTANEOUS at 05:31

## 2021-11-08 RX ADMIN — AMPICILLIN AND SULBACTAM 3 G: 2; 1 INJECTION, POWDER, FOR SOLUTION INTRAMUSCULAR; INTRAVENOUS at 05:30

## 2021-11-08 RX ADMIN — HYDROMORPHONE HYDROCHLORIDE 1 MG: 1 INJECTION, SOLUTION INTRAMUSCULAR; INTRAVENOUS; SUBCUTANEOUS at 17:55

## 2021-11-08 RX ADMIN — AMPICILLIN AND SULBACTAM 3 G: 2; 1 INJECTION, POWDER, FOR SOLUTION INTRAMUSCULAR; INTRAVENOUS at 11:41

## 2021-11-08 RX ADMIN — Medication 1 CAPSULE: at 08:47

## 2021-11-08 RX ADMIN — INSULIN LISPRO 3 UNITS: 100 INJECTION, SOLUTION INTRAVENOUS; SUBCUTANEOUS at 08:47

## 2021-11-08 RX ADMIN — INSULIN GLARGINE 30 UNITS: 100 INJECTION, SOLUTION SUBCUTANEOUS at 21:56

## 2021-11-08 RX ADMIN — Medication 10 ML: at 21:57

## 2021-11-08 RX ADMIN — DAPTOMYCIN 800 MG: 500 INJECTION, POWDER, LYOPHILIZED, FOR SOLUTION INTRAVENOUS at 17:56

## 2021-11-08 RX ADMIN — AMPICILLIN AND SULBACTAM 3 G: 2; 1 INJECTION, POWDER, FOR SOLUTION INTRAMUSCULAR; INTRAVENOUS at 01:10

## 2021-11-08 RX ADMIN — WATER 2 G: 1 INJECTION INTRAMUSCULAR; INTRAVENOUS; SUBCUTANEOUS at 17:56

## 2021-11-08 RX ADMIN — Medication 10 ML: at 05:31

## 2021-11-08 RX ADMIN — INSULIN LISPRO 3 UNITS: 100 INJECTION, SOLUTION INTRAVENOUS; SUBCUTANEOUS at 17:55

## 2021-11-08 RX ADMIN — HYDROMORPHONE HYDROCHLORIDE 1 MG: 1 INJECTION, SOLUTION INTRAMUSCULAR; INTRAVENOUS; SUBCUTANEOUS at 08:47

## 2021-11-08 RX ADMIN — VALSARTAN 160 MG: 80 TABLET, FILM COATED ORAL at 08:47

## 2021-11-08 RX ADMIN — INSULIN LISPRO 18 UNITS: 100 INJECTION, SOLUTION INTRAVENOUS; SUBCUTANEOUS at 11:40

## 2021-11-08 NOTE — PROGRESS NOTES
PICC Placement Note    PRE-PROCEDURE VERIFICATION  Correct Procedure: yes  Correct Site:  yes  Temperature: Temp: 98.4 °F (36.9 °C), Temperature Source: Temp Source: Oral  No results for input(s): BUN, CREA, PLT, INR, WBC, PLTEXT, INREXT in the last 72 hours. No lab exists for component: APTHR  Allergies: Ddavp [desmopressin]  Education materials for PICC Care given: yes. See Patient Education activity for further details. PICC Booklet placed at bedside: yes    Closed Ended PICC Catheters:  Flush Lumens as Follows:  Intermittent Medication:   Flush before and after each medication with 10 ml NS. Unused Ports:  Flush every 8 hours with 10 ml NS.  TPN Ports:  Flush every 24 hours with 20 ml NS prior to hanging new bag. Blood Draws: Stop infusion, draw off and waste 10 ml of blood. Draw sample with 10cc syringe or greater. DO NOT USE VACUTAINER . Transfer with appropriate device to lab  tubes. Flush with 20 ml NS. Dressing Change:  Every 7 days, and PRN using sterile technique if integrity of dressing is compromised. Initial dressing change for central line 24-48 hours post insertion if gauze is used. Apply new dressing per policy. PROCEDURE DETAIL  Consent was obtained and all questions were answered related to risks and benefits. A double lumen PICC line was inserted, as a sterile procedure using ultrasound and modified Seldinger technique for antibiotic therapy. The following documentation is in addition to the PICC properties in the lines/airways flowsheet :  Lot #: [id number]  Lidocaine 1% administered intradermally :yes  Internal Catheter Total Length: 45 cm 2 cm out (cm)  Vein Selection for PICC:right basilic  Central Line Bundle followed yes  Complication Related to Insertion:no    The placement was verified by EKG, MAX P WAVE @ 45 cm 2 cm out (cm). PER EKG PICC TIP @ C/A junction.         Line is okay to use: yes    Angelo Goodpasture, RN

## 2021-11-08 NOTE — PROGRESS NOTES
[facility] Progress Note           Vincenzo Yi DO    [phone] Office  [phone]  Fax    2021      Assessment & Plan:   1. Right diabetic foot infection with osteomyelitis, abscess, and gangrene. Status post I&D with first metatarsal and great toe amputation 2021. Surgical cultures growing CoNS, diphtheroids, Proteus, and Enterococcus. Discussed with Dr. Lebron Bailey. Plan 6 weeks of IV antibiotics. IV antibiotic orders are in the chart. PICC line ordered  2. Diabetes mellitus. Hemoglobin A1c 8.2  3. CKD. Creatinine stable   4. History of von Willebrand's disease            Subjective:     No complaints today. Tolerating Unasyn    Objective:     Vitals:   Visit Vitals  /63 (BP 1 Location: Left upper arm, BP Patient Position: At rest)   Pulse 95   Temp 98.4 °F (36.9 °C)   Resp 18   Ht 5' 10.98\" (1.803 m)   Wt 230 lb (104.3 kg)   SpO2 92%   BMI 32.09 kg/m²        Tmax:  Temp (24hrs), Av.5 °F (36.9 °C), Min:98 °F (36.7 °C), Max:98.8 °F (37.1 °C)      Exam:   Patient is intubated:  no    Physical Examination:   General:   No complaints   Head:  Normocephalic, atraumatic. Eyes:  Conjunctivae clear   Neck: Supple       Lungs:   No distress. Chest wall:     Heart:     Abdomen:   non-distended   Extremities: Moves all. Foot dressed   Skin:  No rash   Neurologic:  No focal deficit     Labs:        No lab exists for component: ITNL   No results for input(s): CPK, CKMB, TROIQ in the last 72 hours. No results for input(s): NA, K, CL, CO2, BUN, CREA, GLU, PHOS, MG, ALB, WBC, HGB, HCT, PLT, HGBEXT, HCTEXT, PLTEXT, HGBEXT, HCTEXT, PLTEXT in the last 72 hours. No lab exists for component:  CA  No results for input(s): INR, PTP, APTT, INREXT, INREXT in the last 72 hours.   Needs: urine analysis, urine sodium, protein and creatinine  Lab Results   Component Value Date/Time    Sodium,urine random 76 2021 05:08 AM         Cultures:     No results found for: SDES  Lab Results   Component Value Date/Time    Culture result: (A) 11/01/2021 08:28 PM     LIGHT ENTEROCOCCUS FAECALIS REFER TO [id number] FOR SUSCEPTIBILITIES    Culture result: (A) 11/01/2021 08:28 PM     LIGHT STAPHYLOCOCCUS SPECIES, COAGULASE NEGATIVE 2 COLONY TYPES/STRAINS    Culture result: LIGHT DIPHTHEROIDS (A) 11/01/2021 08:28 PM    Culture result: NO ANAEROBES ISOLATED 11/01/2021 08:28 PM       Radiology:     Medications       Current Facility-Administered Medications   Medication Dose Route Frequency Last Admin    insulin glargine (LANTUS) injection 30 Units  30 Units SubCUTAneous QHS 30 Units at 11/07/21 2202    ampicillin-sulbactam (UNASYN) 3 g in 0.9% sodium chloride (MBP/ADV) 100 mL MBP  3 g IntraVENous Q6H 3 g at 11/08/21 1141    labetaloL (NORMODYNE;TRANDATE) 20 mg/4 mL (5 mg/mL) injection 10 mg  10 mg IntraVENous Q4H PRN      valsartan (DIOVAN) tablet 160 mg  160 mg Oral DAILY 160 mg at 11/08/21 0847    L.acidophilus-paracasei-S.thermophil-bifidobacter (RISAQUAD) 8 billion cell capsule  1 Capsule Oral DAILY 1 Capsule at 11/08/21 0847    enoxaparin (LOVENOX) injection 40 mg  40 mg SubCUTAneous Q24H 40 mg at 11/08/21 0531    [Held by provider] aspirin delayed-release tablet 81 mg  81 mg Oral DAILY      atorvastatin (LIPITOR) tablet 20 mg  20 mg Oral QHS 20 mg at 11/07/21 2202    diazePAM (VALIUM) tablet 5 mg  5 mg Oral QHS PRN 5 mg at 11/03/21 2259    sodium chloride (NS) flush 5-40 mL  5-40 mL IntraVENous Q8H 10 mL at 11/08/21 0531    sodium chloride (NS) flush 5-40 mL  5-40 mL IntraVENous PRN      acetaminophen (TYLENOL) tablet 650 mg  650 mg Oral Q6H  mg at 11/07/21 2202    Or    acetaminophen (TYLENOL) suppository 650 mg  650 mg Rectal Q6H PRN      polyethylene glycol (MIRALAX) packet 17 g  17 g Oral DAILY PRN      ondansetron (ZOFRAN ODT) tablet 4 mg  4 mg Oral Q8H PRN      Or    ondansetron (ZOFRAN) injection 4 mg  4 mg IntraVENous Q6H PRN      HYDROmorphone (DILAUDID) injection 1 mg  1 mg IntraVENous Q6H PRN 1 mg at 11/08/21 0847    insulin lispro (HUMALOG) injection   SubCUTAneous AC&HS 3 Units at 11/08/21 0847    glucose chewable tablet 16 g  4 Tablet Oral PRN      dextrose (D50W) injection syrg 12.5-25 g  12.5-25 g IntraVENous PRN      glucagon (GLUCAGEN) injection 1 mg  1 mg IntraMUSCular PRN             Case discussed with: Hospitalist, podiatry      Micah Cantrell DO

## 2021-11-08 NOTE — PROGRESS NOTES
4:48 PM  CM spoke to [facility]- they are able to accept for Nursing and PT. Will add to AVS. Nancy Alexandre    11/8/21  2:46 PM    CARE MANAGEMENT NOTE:    CM reviewed EMR. Pt was admitted with right diabetic foot infection with osteo, abscess, and gangrene. PT recommended SNF- CM spoke to patient and he is not agreeable. Agreeable to home health for nursing and PT. CM attempting to locate a home health agency who is able to accept- see below. If no home health agencies can be located, [facility] [id number] can provide nursing care.      RUR 9%     Transition Plan of Care:  1.  Ortho, ID, Podiatry following for medical management - pt is s/p I&D of right foot and great toe amputation, first metatarsal amp on 11/1. Also s/p right foot delayed primary closure on 11/5.  2.  ID put in orders for [address] accepted   3.  Outpt f/u  4.  Pt will arrange his own transportation home- he will start to arrange today in the plan to dc tomorrow. LUZ ELENA Luciano  Care Manager     1:54 PM  CM received notification from [facility] [id number]- they are able to accept and will teach today. [facility], [address] are unable to accept. CM reviewed PT notes and PT is recommending SNF at [place]. CM spoke to patient and he is not agreeable, he had a bad experience. He said he has people who will assist him at home. Plan is for patient to dc tomorrow. CM will continue to follow for dc needs. Nancy Alexandre    12:26 PM    CM sent orders to [facility] [address]- they are running his insurance now. CM sent home health orders to [facility]- awaiting response. 11:26 AM    Patient discussed inf IDR rounds- likely home today with IV ABX. CM spoke to Dr. Pilar Carmen and he will put in orders for IX abx. Patient will need a PICC line before dc. CM spoke to patient and he would like to dc today and would like a referral sent to [facility] [address] for IX abx.  MICHAEL will send orders once in.     LUZ ELENA Luciano  Care Manager

## 2021-11-08 NOTE — PROGRESS NOTES
The [address] [place] [address] Podiatric Surgery  Post-Op Note    Subjective:   Admit Date: 10/31/2021  OR Date: 11/5/2021  Post-op: 3 Days Post-Op  Status Post: delayed primary closure of wound right foot    Martha Keller  appears; alert, well appearing, and in no distress  Pain control is: excellent    Objective[de-identified]    height is 5' 10.98\" (1.803 m) and weight is 104.3 kg (230 lb). His temperature is 98.4 °F (36.9 °C). His blood pressure is 118/63 and his pulse is 95. His respiration is 18 and oxygen saturation is 92%.      Intake/Output Summary (Last 24 hours) at 11/8/2021 1220  Last data filed at 11/8/2021 0957  Gross per 24 hour   Intake    Output 3500 ml   Net -3500 ml       Physical Exam:  Incision: dressing intact without strikethrough  Abdomen: soft, nontender, nondistended, no masses or organomegaly  DVT Exam:No evidence of DVT seen on physical exam.    Labs:  Lab Results   Component Value Date/Time    WBC 9.5 11/05/2021 03:00 AM    HGB 11.6 (L) 11/05/2021 03:00 AM    HCT 35.6 (L) 11/05/2021 03:00 AM    MCV 97.8 11/05/2021 03:00 AM    PLATELET 757 21/61/1736 03:00 AM     Lab Results   Component Value Date/Time    Sodium 132 (L) 11/05/2021 03:00 AM    Potassium 4.5 11/05/2021 03:00 AM    Chloride 98 11/05/2021 03:00 AM    CO2 29 11/05/2021 03:00 AM    Phosphorus 2.3 (L) 11/05/2021 03:00 AM    BUN 14 11/05/2021 03:00 AM    Calcium 8.9 11/05/2021 03:00 AM      Lab Results   Component Value Date/Time    Glucose 193 (H) 11/05/2021 03:00 AM       Impression/Plan:   Principal Problem:    Diabetic foot ulcer ([facility] (10/31/2021)    Active Problems:    Arthritis ()      CAD (coronary artery disease) (3/2018)      Overview: CABG, MI      DM type 2 causing vascular disease ([facility] ()      Prostate cancer ([facility] ()      Von Willebrand disease ([facility] ()      Anxiety and depression ()      Chronic pain ()      Overview: LEFT KNEE      Hyponatremia (11/1/2021)      CKD (chronic kidney disease), stage III (HCC) ()      DM type 2 causing renal disease ([facility] ()      1.  status post delayed primary closure of wound right foot  Plan:   Dressing intact. Continue nonweightbearing to right foot. Ok to continue anticoagulation. Discharge once cleared by PT, ID, and medicine. Follow up in my office upon discharge next week    Oriana Guzmán.  JOSTIN Rosa FACFAS FACCWS [facility]  Triple Board Certified Foot & Ankle Specialist  [phone] cell  November 8, 2021  11:17 AM

## 2021-11-08 NOTE — PROGRESS NOTES
[address] [place] [address]  [address] [facility], [address] [place] [address]  [phone]      Medical Progress Note      NAME: Ashley Heading   :  1950  MRM:  [id number]    Date of service: 2021  9:53 AM       Assessment and Plan:   1. Diabetic foot ulcer/ osteomyelitis of the tibial hallux sesamoid/ Gas forming cellulitis in the great toe/ Plantar soft tissue ulceration has sinus tract from the ulcer to the tibial hallux sesamoid/ Complete rupture of the FHL tendon, which may be secondarily infected: S/p I&D and great toe / first metatarsal amputation on . Intraoperative culture grew CoNS, diphtheroids, Proteus, and Enterococcus. IV abx deescalated from IV vanc, cefepime and Flagyl to Unasyn. ID following. Pathology report showed acute osteomyelitis extending to involve resection margin. Returned to OR on ; podiatry on board. Per Dr Kirk Meyer, pt can be discharged and FU with him.      2.  DM type 2 causing renal, vascular and neurological disease: A1c 8.2. Poorly controlled. On Lantus and SSI     3. CAD (coronary artery disease) / Hypertension: BP controlled. Cont ARB and statin.  ASA on hold post op     4.  Von Willebrand disease: Monitor for bleeding. Holding ASA     5. Chronic pain / Arthritis: APAP PRN     6. Anxiety and depression:  Valium PRN     7. Hx of prostate cancer: Outpatient follow up         Subjective:     Chief Complaint[de-identified] Patient was seen and examined as a follow up for OM. Chart was reviewed. pt is upset not knowing what the plan will be. Wants to go home. ROS:  (bold if positive, if negative)    Tolerating PT  Tolerating Diet        Objective:     Last 24hrs VS reviewed since prior progress note.  Most recent are:    Visit Vitals  /72 (BP 1 Location: Left upper arm, BP Patient Position: At rest)   Pulse 84   Temp 98.4 °F (36.9 °C)   Resp 18   Ht 5' 10.98\" (1.803 m)   Wt 104.3 kg (230 lb)   SpO2 91%   BMI 32.09 kg/m²     SpO2 Readings from Last 6 Encounters:   11/08/21 91%   08/09/19 98%   07/18/19 100%   05/24/19 97%   01/08/19 98%   10/31/17 95%    O2 Flow Rate (L/min): 2 l/min       Intake/Output Summary (Last 24 hours) at 11/8/2021 0915  Last data filed at 11/8/2021 0504  Gross per 24 hour   Intake    Output 3200 ml   Net -3200 ml        Physical Exam:    Gen:  Well-developed, well-nourished, in no acute distress  HEENT:  Pink conjunctivae, PERRL, hearing intact to voice, moist mucous membranes  Neck:  Supple, without masses, thyroid non-tender  Resp:  No accessory muscle use, clear breath sounds without wheezes rales or rhonchi  Card:  No murmurs, normal S1, S2 without thrills, bruits or peripheral edema  Abd:  Soft, non-tender, non-distended, normoactive bowel sounds are present, no palpable organomegaly and no detectable hernias  Lymph:  No cervical or inguinal adenopathy  Musc:  No cyanosis or clubbing  Skin:  No rashes or ulcers, skin turgor is good  Neuro:  Cranial nerves are grossly intact, no focal motor weakness, follows commands appropriately  Psych:  Good insight, oriented to person, place and time, alert  __________________________________________________________________  Medications Reviewed: (see below)  Medications:     Current Facility-Administered Medications   Medication Dose Route Frequency    insulin glargine (LANTUS) injection 30 Units  30 Units SubCUTAneous QHS    ampicillin-sulbactam (UNASYN) 3 g in 0.9% sodium chloride (MBP/ADV) 100 mL MBP  3 g IntraVENous Q6H    labetaloL (NORMODYNE;TRANDATE) 20 mg/4 mL (5 mg/mL) injection 10 mg  10 mg IntraVENous Q4H PRN    valsartan (DIOVAN) tablet 160 mg  160 mg Oral DAILY    L.acidophilus-paracasei-S.thermophil-bifidobacter (RISAQUAD) 8 billion cell capsule  1 Capsule Oral DAILY    enoxaparin (LOVENOX) injection 40 mg  40 mg SubCUTAneous Q24H    [Held by provider] aspirin delayed-release tablet 81 mg  81 mg Oral DAILY    atorvastatin (LIPITOR) tablet 20 mg  20 mg Oral QHS    diazePAM (VALIUM) tablet 5 mg  5 mg Oral QHS PRN    sodium chloride (NS) flush 5-40 mL  5-40 mL IntraVENous Q8H    sodium chloride (NS) flush 5-40 mL  5-40 mL IntraVENous PRN    acetaminophen (TYLENOL) tablet 650 mg  650 mg Oral Q6H PRN    Or    acetaminophen (TYLENOL) suppository 650 mg  650 mg Rectal Q6H PRN    polyethylene glycol (MIRALAX) packet 17 g  17 g Oral DAILY PRN    ondansetron (ZOFRAN ODT) tablet 4 mg  4 mg Oral Q8H PRN    Or    ondansetron (ZOFRAN) injection 4 mg  4 mg IntraVENous Q6H PRN    HYDROmorphone (DILAUDID) injection 1 mg  1 mg IntraVENous Q6H PRN    insulin lispro (HUMALOG) injection   SubCUTAneous AC&HS    glucose chewable tablet 16 g  4 Tablet Oral PRN    dextrose (D50W) injection syrg 12.5-25 g  12.5-25 g IntraVENous PRN    glucagon (GLUCAGEN) injection 1 mg  1 mg IntraMUSCular PRN        Lab Data Reviewed: (see below)  Lab Review:     No results for input(s): WBC, HGB, HCT, PLT, HGBEXT, HCTEXT, PLTEXT, HGBEXT, HCTEXT, PLTEXT in the last 72 hours. No results for input(s): NA, K, CL, CO2, GLU, BUN, CREA, CA, MG, PHOS, ALB, TBIL, TBILI, ALT, INR, INREXT, INREXT in the last 72 hours. No lab exists for component: SGOT  Lab Results   Component Value Date/Time    Glucose (POC) 225 (H) 11/08/2021 07:10 AM    Glucose (POC) 254 (H) 11/07/2021 09:31 PM    Glucose (POC) 244 (H) 11/07/2021 04:35 PM    Glucose (POC) 253 (H) 11/07/2021 11:03 AM    Glucose (POC) 223 (H) 11/07/2021 06:39 AM     No results for input(s): PH, PCO2, PO2, HCO3, FIO2 in the last 72 hours. No results for input(s): INR, INREXT, INREXT in the last 72 hours.   All Micro Results     Procedure Component Value Units Date/Time    CULTURE, BLOOD [[id number]] Collected: 10/31/21 2143    Order Status: Completed Specimen: Blood Updated: 11/06/21 0557     Special Requests: NO SPECIAL REQUESTS        Culture result: NO GROWTH 6 DAYS       CULTURE, ANAEROBIC [[id number]] Collected: 11/01/21 2028    Order Status: Completed Specimen: Foot Updated: 11/04/21 1214     Special Requests: NO SPECIAL REQUESTS        Culture result: NO ANAEROBES ISOLATED       CULTURE, BODY FLUID Mliss Dahlia STAIN [[id number]]  (Abnormal) Collected: 11/01/21 2028    Order Status: Completed Specimen: Foot Updated: 11/04/21 1209     Special Requests: NO SPECIAL REQUESTS        GRAM STAIN OCCASIONAL WBCS SEEN         NO ORGANISMS SEEN        Culture result:       LIGHT ENTEROCOCCUS FAECALIS REFER TO [id number] FOR SUSCEPTIBILITIES                  LIGHT STAPHYLOCOCCUS SPECIES, COAGULASE NEGATIVE 2 COLONY TYPES/STRAINS            LIGHT DIPHTHEROIDS       CULTURE, WOUND Mliss Dahlia STAIN [[id number]]  (Abnormal)  (Susceptibility) Collected: 11/01/21 0141    Order Status: Completed Specimen: Foot Updated: 11/04/21 0945     Special Requests: NO SPECIAL REQUESTS        GRAM STAIN OCCASIONAL WBCS SEEN         FEW GRAM POSITIVE COCCI        Culture result:       LIGHT ENTEROCOCCUS FAECALIS            SCANT PROTEUS MIRABILIS               LIGHT MIXED SKIN SANG ISOLATED          COVID-19 RAPID TEST [[id number]] Collected: 11/01/21 1456    Order Status: Completed Specimen: Nasopharyngeal Updated: 11/01/21 1536     Specimen source Nasopharyngeal        COVID-19 rapid test Not detected        Comment: Rapid Abbott ID Now       Rapid NAAT:  The specimen is NEGATIVE for SARS-CoV-2, the novel coronavirus associated with COVID-19. Negative results should be treated as presumptive and, if inconsistent with clinical signs and symptoms or necessary for patient management, should be tested with an alternative molecular assay. Negative results do not preclude SARS-CoV-2 infection and should not be used as the sole basis for patient management decisions. This test has been authorized by the FDA under an Emergency Use Authorization (EUA) for use by authorized laboratories.    Fact sheet for Healthcare Providers: ConventionUpdate.co.nz  Fact sheet for Patients: kstattoo.com       Methodology: Isothermal Nucleic Acid Amplification               I have reviewed notes of prior 24hr. Other pertinent lab: Total time spent with patient: 28 I personally reviewed chart, notes, data and current medications in the medical record. I have personally examined and treated the patient at bedside during this period.                  Care Plan discussed with: Patient, Nursing Staff and >50% of time spent in counseling and coordination of care    Discussed:  Care Plan and D/C Planning    Prophylaxis:  Lovenox    Disposition:  Home w/Family           ___________________________________________________    Attending Physician: Christa Cardozo MD

## 2021-11-08 NOTE — PROGRESS NOTES
Problem: Mobility Impaired (Adult and Pediatric)  Goal: *Acute Goals and Plan of Care (Insert Text)  Description: FUNCTIONAL STATUS PRIOR TO ADMISSION: Patient was modified independent using a cane for community mobility and no device for household mobility. Drives private vehicle. HOME SUPPORT PRIOR TO ADMISSION: The patient lived alone with neighbors to provide assistance. Physical Therapy Goals  Revised 11/8/21  1. Patient will move from supine to sit and sit to supine  in bed with supervision/set-up within 7 day(s). 2.  Patient will transfer from bed to chair and chair to bed with moderate assist using the least restrictive device within 7 day(s). 3.  Patient will perform sit to stand with minimal to moderate assist assist within 7 day(s). 4.  Patient will ambulate with minimal assistance/contact guard assist for 5 feet with the least restrictive device while maintaining NWB RLE within 7 day(s). Outcome: Progressing Towards Goal   PHYSICAL THERAPY TREATMENT: WEEKLY REASSESSMENT  Patient: Aurea Randall (39 y.o. male)  Date: 11/8/2021  Primary Diagnosis: Diabetic foot ulcer ([facility] [E11.621, L97.509]  Procedure(s) (LRB):  RIGHT FOOT DELAYED PRIMARY CLOSURE (Right) 3 Days Post-Op   Precautions:  NWB, Fall (RLE NWB)      ASSESSMENT  Patient continues with skilled PT services and is not progressing towards goals. Patient remains very deconditioned with inability to effectively stand, transfer or ambulate at this time. Patient is very insistent on returning to his home alone. He is not safe for discharge to that environment as he cannot care for himself, though he insists that he can. Refuses rehab.      Patient's progression toward goals since last assessment: poor progress; has not been seen much due to being unavailable at times for therapy    Current Level of Function Impacting Discharge (mobility/balance):  Patient is POD #3 for right foot delayed primary closure and per podiatry remains NWB to RLE. He currently requires +2 max assist to stand at bedside with rolling walker (x3 attempts). Cannot stand upright, or maintain NWB status; patient insists he is only \"resting the heel\" on the floor. No steps taken. Patient able to scoot to head of bed in sitting position with CGA. Functional Outcome Measure: The patient scored 30/100 on the Barthel outcome measure which is indicative of 70% impairment. Other factors to consider for discharge: lives alone         PLAN :  Goals have been updated based on progression since last assessment. Patient continues to benefit from skilled intervention to address the above impairments. Recommendations and Planned Interventions: bed mobility training, transfer training, gait training, and therapeutic exercises      Frequency/Duration: Patient will be followed by physical therapy:  5 times a week to address goals. Recommendation for discharge: (in order for the patient to meet his/her long term goals)  Therapy up to 5 days/week in SNF setting    This discharge recommendation:  Has not yet been discussed the attending provider and/or case management    IF patient discharges home will need the following DME: patient owns DME required for discharge         SUBJECTIVE:   Patient stated I am getting out of here; I better be; I have had it.     OBJECTIVE DATA SUMMARY:   HISTORY:    Past Medical History:   Diagnosis Date    Anxiety and depression     Arthritis     CAD (coronary artery disease) 03/2018    CABG, [place]    Chronic pain     LEFT KNEE    CKD (chronic kidney disease), stage III ([address]     DM type 2 causing renal disease ([address]     DM type 2 causing vascular disease ([address]     Prostate cancer ([address]     Von Willebrand disease ([address]      Past Surgical History:   Procedure Laterality Date    HX APPENDECTOMY  1979    HX ARTERIAL BYPASS      HX CATARACT REMOVAL Bilateral 2016, 2011    HX GI  2017    COLONOSCOPY WITH NO POLYPS    HX HEENT  1949    T&A    HX HEENT  1972    EXTRACTION OF WISDOM TEETH X 4 (2 SURGERIES 2 TEETH AT A TIME)    HX ORTHOPAEDIC  02/2006    RIGHT HAMMER TOES X 4 FIXED    HX OTHER SURGICAL  1963    RIGHT INNER THIGH CEBACIOUS CYST REMOVED    HX PROSTATECTOMY  06/2012    RADICAL PROSTATECTOMY    TX ABDOMEN SURGERY PROC UNLISTED  1954    RIGHT CONGENITAL INGUINAL HERNIA REPAIR    TX ABDOMEN SURGERY PROC UNLISTED  1991    LEFT INGUINAL HERNIA REPAIR    TX ABDOMEN SURGERY [address] UNLISTED      SIGMOIDOSCOPY-FOR HEMMORIODS. TX CABG, ARTERY-VEIN, FOUR  03/2018    CABG    TX TOTAL HIP ARTHROPLASTY Right 2017       Personal factors and/or comorbidities impacting plan of care: medical complexity, NWB status, high fall risk; has not been able to reach PT goals. No family support. Home Situation  Home Environment: Private residence  # Steps to Enter: 7 (3 with 1 rail + 4 with bilateral rails )  One/Two Story Residence: One story  Living Alone: Yes  Support Systems: Friend/Neighbor  Patient Expects to be Discharged to[de-identified] [employer]  Current DME Used/Available at Home: [place], straight, Walker, rolling, Walker, rollator  Tub or Shower Type: Tub/Shower combination    EXAMINATION/PRESENTATION/DECISION MAKING:   Critical Behavior:  Neurologic State: Alert  Orientation Level: Oriented X4  Cognition: Follows commands  Safety/Judgement: Decreased awareness of need for assistance, Decreased insight into deficits  Hearing: Auditory  Auditory Impairment: None  Skin:  not observed    Range Of Motion:         WFL to LLE                 Strength:           Generally decreased and nonfunctional              Tone & Sensation:       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                              Functional Mobility:  Bed Mobility:     Supine to Sit: Minimum assistance; Additional time     Scooting: Contact guard assistance  Transfers:  Sit to Stand: Assist x2; Maximum assistance;  Additional time                          Balance:   Sitting: Intact  Standing: Impaired  Standing - Static: Constant support  Ambulation/Gait Training:  Distance (ft):  (stood only x3; no steps taken)  Assistive Device: Gait belt; Walker, rolling              Right Side Weight Bearing: Non-weight bearing                                                 Therapeutic Exercises:   LLE SLR, heel slides, hip abduction, gluteal sets    Functional Measure:  Barthel Index:    Bathin  Bladder: 0  Bowels: 10  Groomin  Dressin  Feeding: 10  Mobility: 0  Stairs: 0  Toilet Use: 0  Transfer (Bed to Chair and Back): 0  Total: 30/100       The Barthel ADL Index: Guidelines  1. The index should be used as a record of what a patient does, not as a record of what a patient could do. 2. The main aim is to establish degree of independence from any help, physical or verbal, however minor and for whatever reason. 3. The need for supervision renders the patient not independent. 4. A patient's performance should be established using the best available evidence. Asking the patient, friends/relatives and nurses are the usual sources, but direct observation and common sense are also important. However direct testing is not needed. 5. Usually the patient's performance over the preceding 24-48 hours is important, but occasionally longer periods will be relevant. 6. Middle categories imply that the patient supplies over 50 per cent of the effort. 7. Use of aids to be independent is allowed. Monique Gonzalez., Barthel, D.W. ([id number]). Functional evaluation: the Barthel Index. [facility] (14)2. JULIET Leo, Maria Guadalupe Herrera., Threasa Hammans., [address] (). Measuring the change indisability after inpatient rehabilitation; comparison of the responsiveness of the Barthel Index and Functional [place] Measure. Journal of Neurology, Neurosurgery, and Psychiatry, 66(4), 097-336.   Fallon Ledezma, N.J.A, WAYNE Yeung, & Luis M Sommers M.A. (2004.) Assessment of post-stroke quality of life in cost-effectiveness studies: The usefulness of the Barthel Index and the EuroQoL-5D. Quality of Life Research, 13, 427-42          Pain Rating:  None reported    Activity Tolerance:   requires frequent rest breaks    After treatment patient left in no apparent distress:   Heels elevated for pressure relief, Call bell within reach, and Side rails x 3; left in chair position in bed. COMMUNICATION/EDUCATION:   The patients plan of care was discussed with:  OT . Fall prevention education was provided and the patient/caregiver indicated understanding. and Patient/family agree to work toward stated goals and plan of care.     Thank you for this referral.  Ines Charles, PT   Time Calculation: 35 mins

## 2021-11-08 NOTE — PROGRESS NOTES
Problem: Self Care Deficits Care Plan (Adult)  Goal: *Acute Goals and Plan of Care (Insert Text)  Description:   FUNCTIONAL STATUS PRIOR TO ADMISSION: Patient was modified independent using a cane for community mobility and no device for household mobility. Drives private vehicle. HOME SUPPORT PRIOR TO ADMISSION: The patient lived alone with neighbors to provide assistance. Occupational Therapy Goals  Initiated 11/2/2021; Reviewed 11/8/2021 for weekly reassessment, none met, continue all goals  1. Patient will perform lower body dressing with minimal assistance/contact guard assist within 7 day(s). 2.  Patient will perform bathing task with minimal assistance/contact guard assist within 7 day(s). 3.  Patient will perform toilet transfers with minimal assistance/contact guard assist within 7 day(s). 4.  Patient will perform all aspects of toileting with minimal assistance/contact guard assist within 7 day(s). 5.  Patient will participate in upper extremity therapeutic exercise/activities with supervision/set-up for 10 minutes within 7 day(s). Outcome: Progressing Towards Goal   OCCUPATIONAL THERAPY TREATMENT/WEEKLY RE-EVALUATION  Patient: Maame Lopez (88 y.o. male)  Date: 11/8/2021  Diagnosis: Diabetic foot ulcer ([facility] [E11.621, L97.509]   Diabetic foot ulcer ([facility]  Procedure(s) (LRB):  RIGHT FOOT DELAYED PRIMARY CLOSURE (Right) 3 Days Post-Op  Precautions: NWB, Fall (RLE NWB)  Chart, occupational therapy assessment, plan of care, and goals were reviewed. ASSESSMENT  Based on the objective data described below, pt is functioning below his mod I baseline due to NWB RLE, decreased balance, endurance and strength, and impaired functional mobility. He was received semisupine in bed, agreeable to participate. Performed grooming ADL in semisupine with setup. He transferred supine>sit, good sitting balance, required total A to don L shoe.  Pt stood 2 times to RW with max A x2 and increased time, unable to take steps and had difficulty maintaining NWB RLE. In sitting pt was able to scoot laterally towards [facility] and then returned to [facility] with min A. Remained in bed at end of session with needs met. At this time pt is functioning well below his mod I baseline and continues to benefit from skilled intervention to address the above impairments. Recommend SNF rehab at discharge as pt is not safe to return home alone at current functional level, however pt is declining rehab. Current Level of Function Impacting Discharge (ADLs): Min A bed mobility, max A x2 sit>stand, setup-total A ADLs    Other factors to consider for discharge: fall risk, NWB RLE, lives alone, requires assist x2 for transfers currently         PLAN :  Goals have been updated based on progression since last assessment. Continue to follow patient 3 times a week to address goals. Recommendation for discharge: (in order for the patient to meet his/her long term goals)  Therapy up to 5 days/week in SNF setting    This discharge recommendation:  Has been made in collaboration with the attending provider and/or case management         SUBJECTIVE:   Patient stated I'm just resting it on the floor (RLE).     OBJECTIVE DATA SUMMARY:   Cognitive/Behavioral Status:  Neurologic State: Alert  Orientation Level: Oriented X4  Cognition: Follows commands  Perception: Appears intact  Perseveration: No perseveration noted  Safety/Judgement: Decreased insight into deficits; Decreased awareness of need for assistance; Fall prevention    Functional Mobility and Transfers for ADLs:  Bed Mobility:  Supine to Sit: Minimum assistance; Additional time  Sit to Supine: Minimum assistance  Scooting: Contact guard assistance    Transfers:  Sit to Stand: Assist x2; Maximum assistance;  Additional time          Balance:  Sitting: Intact  Standing: Impaired  Standing - Static: Constant support    ADL Intervention:       Grooming  Position Performed: (semisupine)  Washing Face: Set-up         Lower Body Dressing Assistance  Slip on Shoes with Back: Total assistance (dependent) (to don L shoe)         Cognitive Retraining  Safety/Judgement: Decreased insight into deficits; Decreased awareness of need for assistance;  Fall prevention    Pain:  Pt did not report pain during session    Activity Tolerance:   Fair    After treatment patient left in no apparent distress:   Supine in bed, Call bell within reach, Bed / chair alarm activated, and Side rails x 3    COMMUNICATION/COLLABORATION:   The patients plan of care was discussed with: Physical Therapist and Registered Nurse    Memo Espinal OT  Time Calculation: 33 mins

## 2021-11-08 NOTE — PROGRESS NOTES
Nutrition Note    RD was asked by dietary aid to speak with patient. Patient stated he has never had a \"protein problem\" and does not wish to take Ajay Blower- states today is the first time he has received it despite orders being in for 3 days. States he drinks 2 muscle milks at home. Questioned diet order and why he was receiving rice. RD attempted to explain carbohydrate consistent diet to patient but patient stated \"we have a difference of opinion, I can't wait to get out of here\".   RD to d/c Enrrique    Electronically signed by Catalina Orellana RD on 04/4/9412  Contact: [phone] or via [employer]

## 2021-11-09 VITALS
DIASTOLIC BLOOD PRESSURE: 75 MMHG | OXYGEN SATURATION: 92 % | BODY MASS INDEX: 32.2 KG/M2 | HEIGHT: 71 IN | SYSTOLIC BLOOD PRESSURE: 118 MMHG | HEART RATE: 72 BPM | RESPIRATION RATE: 18 BRPM | TEMPERATURE: 98.9 F | WEIGHT: 230 LBS

## 2021-11-09 LAB
GLUCOSE BLD STRIP.AUTO-MCNC: 224 MG/DL (ref 65–117)
GLUCOSE BLD STRIP.AUTO-MCNC: 237 MG/DL (ref 65–117)
SERVICE CMNT-IMP: ABNORMAL
SERVICE CMNT-IMP: ABNORMAL

## 2021-11-09 PROCEDURE — 02HV33Z INSERTION OF INFUSION DEVICE INTO SUPERIOR VENA CAVA, PERCUTANEOUS APPROACH: ICD-10-PCS | Performed by: INTERNAL MEDICINE

## 2021-11-09 PROCEDURE — 99231 SBSQ HOSP IP/OBS SF/LOW 25: CPT | Performed by: INTERNAL MEDICINE

## 2021-11-09 PROCEDURE — 74011636637 HC RX REV CODE- 636/637: Performed by: INTERNAL MEDICINE

## 2021-11-09 PROCEDURE — 2709999900 HC NON-CHARGEABLE SUPPLY

## 2021-11-09 PROCEDURE — 94760 N-INVAS EAR/PLS OXIMETRY 1: CPT

## 2021-11-09 PROCEDURE — 74011250637 HC RX REV CODE- 250/637: Performed by: INTERNAL MEDICINE

## 2021-11-09 PROCEDURE — 74011250636 HC RX REV CODE- 250/636: Performed by: INTERNAL MEDICINE

## 2021-11-09 PROCEDURE — 82962 GLUCOSE BLOOD TEST: CPT

## 2021-11-09 PROCEDURE — 77030018786 HC NDL GD F/USND BARD -B

## 2021-11-09 RX ADMIN — ENOXAPARIN SODIUM 40 MG: 100 INJECTION SUBCUTANEOUS at 05:19

## 2021-11-09 RX ADMIN — INSULIN LISPRO 3 UNITS: 100 INJECTION, SOLUTION INTRAVENOUS; SUBCUTANEOUS at 08:15

## 2021-11-09 RX ADMIN — VALSARTAN 160 MG: 80 TABLET, FILM COATED ORAL at 08:15

## 2021-11-09 RX ADMIN — HYDROMORPHONE HYDROCHLORIDE 1 MG: 1 INJECTION, SOLUTION INTRAMUSCULAR; INTRAVENOUS; SUBCUTANEOUS at 00:12

## 2021-11-09 RX ADMIN — Medication 1 CAPSULE: at 08:15

## 2021-11-09 RX ADMIN — Medication 5 ML: at 06:00

## 2021-11-09 NOTE — PROGRESS NOTES
11/9/2021   CARE MANAGEMENT NOTE:  CM reviewed EMR. Pt was admitted with right diabetic foot infection with osteo, abscess, and gangrene. Reportedly, pt resides alone in a ground floor apt located in [facility]. Umm Piña (c [phone]) is the decision maker.     RUR 8%; LOS 8 days     Transition Plan of Care:  1.  Pt will discharge home on IV ABX (Dapto 800 mg IV daily thru 12/18/21 and Ceftriaxone 2 g IV daily thru 12/18/21. Nevaeh Churchill 1237 [facility] the [facility]. 2.  Outpt f/u  3.  Pt will arrange his own transportation home     No further post discharge needs indicated at this time.   Bere

## 2021-11-09 NOTE — PROGRESS NOTES
11/9/2021  9:06 AM    Medicare pt has received, reviewed, and signed 2nd IM letter informing them of their right to appeal the discharge. Signed copied has been placed on pt bedside chart.     Ophelia Godoy RN

## 2021-11-09 NOTE — DISCHARGE SUMMARY
Hospitalist Discharge Summary     Patient ID:    Milla Chapman  [id number]  26 y.o.  1950    Admit date of service: 10/31/2021    Discharge date of service: 11/9/2021    Admission Diagnoses: Diabetic foot ulcer ([facility] [W47.516, L97.509]    Chronic Diagnoses:    Problem List as of 11/9/2021 Date Reviewed: 8/5/2019          Codes Class Noted - Resolved    Arthritis ICD-10-CM: M19.90  ICD-9-CM: 716.90  Unknown - Present        DM type 2 causing vascular disease ([facility] ICD-10-CM: E11.59  ICD-9-CM: 250.70, 443.81  Unknown - Present        Prostate cancer ([facility] ICD-10-CM: C61  ICD-9-CM: 80  Unknown - Present        Alayne Medicine disease ([facility] ICD-10-CM: D68.0  ICD-9-CM: 086. 4  Unknown - Present        Anxiety and depression ICD-10-CM: F41.9, F32. A  ICD-9-CM: 300.00, 311  Unknown - Present        Chronic pain ICD-10-CM: G89.29  ICD-9-CM: 338.29  Unknown - Present    Overview Signed 11/1/2021  7:56 AM by Pastor Boxer, MD     LEFT KNEE             Hyponatremia ICD-10-CM: E87.1  ICD-9-CM: 276.1  11/1/2021 - Present        CKD (chronic kidney disease), stage III ([facility] ICD-10-CM: N18.30  ICD-9-CM: 693. 3  Unknown - Present        DM type 2 causing renal disease ([facility] ICD-10-CM: E11.29  ICD-9-CM: 250.40  Unknown - Present        * (Principal) Diabetic foot ulcer ([facility] ICD-10-CM: E11.621, R22.937  ICD-9-CM: 250.80, 707.15  10/31/2021 - Present        CAD (coronary artery disease) ICD-10-CM: I25.10  ICD-9-CM: 414.00  3/2018 - Present    Overview Signed 11/1/2021  7:56 AM by Pastor Boxer, MD     CABG, [place]             RESOLVED: Primary osteoarthritis of left knee ICD-10-CM: M17.12  ICD-9-CM: 715.16  8/5/2019 - 11/1/2021        RESOLVED: Primary osteoarthritis of right hip ICD-10-CM: M16.11  ICD-9-CM: 715.15  10/30/2017 - 11/1/2021              Discharge Medications:   Current Discharge Medication List      START taking these medications    Details   L.acid,para-B. bifidum-S.therm (RISAQUAD) 8 billion cell cap cap Take 1 Capsule by mouth daily. Qty: 30 Capsule, Refills: 0         CONTINUE these medications which have NOT CHANGED    Details   insulin glargine-lixisenatide (Soliqua 100/33) 100 unit-33 mcg/mL inpn 18 Units by SubCUTAneous route nightly. pioglitazone (ACTOS) 15 mg tablet Take 15 mg by mouth daily. aspirin delayed-release 81 mg tablet Take 81 mg by mouth daily. diazePAM (VALIUM) 5 mg tablet Take 5 mg by mouth as needed for Anxiety. simvastatin (ZOCOR) 40 mg tablet Take 40 mg by mouth nightly. metFORMIN (GLUCOPHAGE) 500 mg tablet Take 500 mg by mouth two (2) times daily (with meals). irbesartan (AVAPRO) 150 mg tablet Take 150 mg by mouth daily. IN AM         STOP taking these medications       trimethoprim-sulfamethoxazole (Bactrim DS) 160-800 mg per tablet Comments:   Reason for Stopping: Follow up Care:    1. Claudean Redwood, MD in 1-2 weeks  2. Dr Colbert    Diet:  Diabetic Diet    Disposition:  Home. Advanced Directive:    Discharge Exam:  See today's note. CONSULTATIONS: ID and podiatry     Significant Diagnostic Studies:   No results for input(s): WBC, HGB, HCT, PLT, HGBEXT, HCTEXT, PLTEXT in the last 72 hours. No results for input(s): NA, K, CL, CO2, BUN, CREA, GLU, CA, MG, PHOS, URICA in the last 72 hours. No results for input(s): ALT, AP, TBIL, TBILI, TP, ALB, GLOB, GGT, AML, LPSE in the last 72 hours. No lab exists for component: SGOT, GPT, AMYP, HLPSE  No results for input(s): INR, PTP, APTT, INREXT in the last 72 hours. No results for input(s): FE, TIBC, PSAT, FERR in the last 72 hours. No results for input(s): PH, PCO2, PO2 in the last 72 hours. No results for input(s): CPK, CKMB in the last 72 hours.     No lab exists for component: TROPONINI  Lab Results   Component Value Date/Time    Glucose (POC) 224 (H) 11/09/2021 07:03 AM    Glucose (POC) 210 (H) 11/08/2021 09:47 PM    Glucose (POC) 198 (H) 11/08/2021 09:08 PM    Glucose (POC) 229 (H) 11/08/2021 04:04 PM    Glucose (POC) 357 (H) 11/08/2021 11:14 AM             HOSPITAL COURSE & TREATMENT RENDERED:   1.  Diabetic foot ulcer/ osteomyelitis of the tibial hallux sesamoid/ Gas forming cellulitis in the great toe/ Plantar soft tissue ulceration has sinus tract from the ulcer to the tibial hallux sesamoid/ Complete rupture of the FHL tendon, which may be secondarily infected: S/p I&D and great toe / first metatarsal amputation on 11/1. Intraoperative culture grew CoNS, diphtheroids, Proteus, and Enterococcus. IV abx deescalated from IV vanc, cefepime and Flagyl to Unasyn. Pathology report showed acute osteomyelitis extending to involve resection margin. Returned to [facility] 11/05; podiatry on board. Per Dr Maxine Warner, pt can be discharged and FU with him. Discharged on Daptomycin 800 mg IV daily through 12/18/2021 and Ceftriaxone 2 g IV daily through 12/18/2021     2.  DM type 2 causing renal, vascular and neurological disease: A1c 8.2. Poorly controlled. continue home regimen.      3.  CAD (coronary artery disease) / Hypertension: BP controlled. Cont ARB and statin.  ASA on hold post op     4.  Von Willebrand disease: Monitor for bleeding. Holding ASA     5. Chronic pain / Arthritis: APAP PRN     6.  Anxiety and depression:  Valium PRN     7.  Hx of prostate cancer: Outpatient follow up        Discharged in improved condition.     Spent 35 minutes    Signed:  Charissa Aquino MD  11/9/2021  7:47 AM

## 2021-11-09 NOTE — PROGRESS NOTES
Bedside and Verbal shift change report given to May (oncoming nurse) by David Ingram (offgoing nurse). Report included the following information SBAR, Kardex and MAR.

## 2021-11-09 NOTE — PROGRESS NOTES
Pharmacist Discharge Medication Reconciliation    Discharge Provider:  Dr. Mike Roque       Discharge Medications:      My Medications        START taking these medications        Instructions Each Dose to Equal Morning Noon Evening Bedtime   L.acid,para-B. bifidum-S.therm 8 billion cell Cap cap  Commonly known as: RISAQUAD    Your last dose was: Your next dose is: Take 1 Capsule by mouth daily. 1 Capsule                        CONTINUE taking these medications        Instructions Each Dose to Equal Morning Noon Evening Bedtime   Actos 15 mg tablet  Generic drug: pioglitazone    Your last dose was: Your next dose is: Take 15 mg by mouth daily. 15 mg                 aspirin delayed-release 81 mg tablet    Your last dose was: Your next dose is: Take 81 mg by mouth daily. 81 mg                 diazePAM 5 mg tablet  Commonly known as: VALIUM    Your last dose was: Your next dose is: Take 5 mg by mouth as needed for Anxiety. 5 mg                 Glucophage 500 mg tablet  Generic drug: metFORMIN    Your last dose was: Your next dose is: Take 500 mg by mouth two (2) times daily (with meals). 500 mg                 irbesartan 150 mg tablet  Commonly known as: AVAPRO    Your last dose was: Your next dose is: Take 150 mg by mouth daily. IN AM   150 mg                 simvastatin 40 mg tablet  Commonly known as: ZOCOR    Your last dose was: Your next dose is: Take 40 mg by mouth nightly. 40 mg                 Soliqua 100/33 100 unit-33 mcg/mL Inpn  Generic drug: insulin glargine-lixisenatide    Your last dose was: Your next dose is:         18 Units by SubCUTAneous route nightly.    18 Units                        STOP taking these medications      Bactrim -800 mg per tablet  Generic drug: trimethoprim-sulfamethoxazole                  Where to Get Your Medications        These medications were sent to [id number] - [address], [facility] [address]  [address], [facility] [address]      Phone: [phone]   L.acid,para-B. bifidum-S.therm 8 billion cell Cap cap               The patient's chart, MAR, and AVS were reviewed by   Ventura Orozco, PharmD, BCPS  Contact: [phone]

## 2021-11-09 NOTE — PROGRESS NOTES
PIV removed. PICC line intact. Discharge instructions given and gone over with pt. Opportunity to ask questions given.

## 2021-11-09 NOTE — PROGRESS NOTES
[address] [place] [address] [facility] Progress Note           Nafisa Rees DO    [phone] Office  [phone]  Fax    2021      Assessment & Plan:   1. Right diabetic foot infection with osteomyelitis, abscess, and gangrene. Status post I&D with first metatarsal and great toe amputation 2021. Surgical cultures growing CoNS, diphtheroids, Proteus, and Enterococcus. Discussed with Dr. Rajendra Smith. Plan 6 weeks of IV antibiotics. IV antibiotic orders are in the chart  2. Diabetes mellitus. Hemoglobin A1c 8.2  3. CKD. Creatinine stable   4. History of von Willebrand's disease            Subjective:     No complaints today. Objective:     Vitals:   Visit Vitals  /65 (BP 1 Location: Left arm, BP Patient Position: At rest)   Pulse 71   Temp 98.6 °F (37 °C)   Resp 18   Ht 5' 10.98\" (1.803 m)   Wt 230 lb (104.3 kg)   SpO2 93%   BMI 32.09 kg/m²        Tmax:  Temp (24hrs), Av.7 °F (37.1 °C), Min:98.4 °F (36.9 °C), Max:99.3 °F (37.4 °C)      Exam:   Patient is intubated:  no    Physical Examination:   General:   No complaints   Head:  Normocephalic, atraumatic. Eyes:  Conjunctivae clear   Neck: Supple       Lungs:   No distress. Chest wall:     Heart:     Abdomen:   non-distended   Extremities: Moves all. Foot dressed   Skin:  No rash   Neurologic:  No focal deficit     Labs:        No lab exists for component: ITNL   No results for input(s): CPK, CKMB, TROIQ in the last 72 hours. No results for input(s): NA, K, CL, CO2, BUN, CREA, GLU, PHOS, MG, ALB, WBC, HGB, HCT, PLT, HGBEXT, HCTEXT, PLTEXT, HGBEXT, HCTEXT, PLTEXT in the last 72 hours. No lab exists for component:  CA  No results for input(s): INR, PTP, APTT, INREXT, INREXT in the last 72 hours.   Needs: urine analysis, urine sodium, protein and creatinine  Lab Results   Component Value Date/Time    Sodium,urine random 76 2021 05:08 AM         Cultures:     No results found for: SDES  Lab Results   Component Value Date/Time Culture result: (A) 11/01/2021 08:28 PM     LIGHT ENTEROCOCCUS FAECALIS REFER TO [id number] FOR SUSCEPTIBILITIES    Culture result: (A) 11/01/2021 08:28 PM     LIGHT STAPHYLOCOCCUS SPECIES, COAGULASE NEGATIVE 2 COLONY TYPES/STRAINS    Culture result: LIGHT DIPHTHEROIDS (A) 11/01/2021 08:28 PM    Culture result: NO ANAEROBES ISOLATED 11/01/2021 08:28 PM       Radiology:     Medications       Current Facility-Administered Medications   Medication Dose Route Frequency Last Admin    DAPTOmycin (CUBICIN) 800 mg in 0.9% sodium chloride 16 mL IV Syringe  800 mg IntraVENous Q24H 800 mg at 11/08/21 1756    cefTRIAXone (ROCEPHIN) 2 g in sterile water (preservative free) 20 mL IV syringe  2 g IntraVENous Q24H 2 g at 11/08/21 1756    alteplase (CATHFLO) 1 mg in sterile water (preservative free) 1 mL injection  1 mg InterCATHeter PRN      insulin glargine (LANTUS) injection 30 Units  30 Units SubCUTAneous QHS 30 Units at 11/08/21 2156    labetaloL (NORMODYNE;TRANDATE) 20 mg/4 mL (5 mg/mL) injection 10 mg  10 mg IntraVENous Q4H PRN      valsartan (DIOVAN) tablet 160 mg  160 mg Oral DAILY 160 mg at 11/09/21 0815    L.acidophilus-paracasei-S.thermophil-bifidobacter (RISAQUAD) 8 billion cell capsule  1 Capsule Oral DAILY 1 Capsule at 11/09/21 0815    enoxaparin (LOVENOX) injection 40 mg  40 mg SubCUTAneous Q24H 40 mg at 11/09/21 0519    [Held by provider] aspirin delayed-release tablet 81 mg  81 mg Oral DAILY      diazePAM (VALIUM) tablet 5 mg  5 mg Oral QHS PRN 5 mg at 11/03/21 2259    sodium chloride (NS) flush 5-40 mL  5-40 mL IntraVENous Q8H 5 mL at 11/09/21 0600    sodium chloride (NS) flush 5-40 mL  5-40 mL IntraVENous PRN      acetaminophen (TYLENOL) tablet 650 mg  650 mg Oral Q6H  mg at 11/07/21 2202    Or    acetaminophen (TYLENOL) suppository 650 mg  650 mg Rectal Q6H PRN      polyethylene glycol (MIRALAX) packet 17 g  17 g Oral DAILY PRN      ondansetron (ZOFRAN ODT) tablet 4 mg  4 mg Oral Q8H PRN Or    ondansetron (ZOFRAN) injection 4 mg  4 mg IntraVENous Q6H PRN      HYDROmorphone (DILAUDID) injection 1 mg  1 mg IntraVENous Q6H PRN 1 mg at 11/09/21 0012    insulin lispro (HUMALOG) injection   SubCUTAneous AC&HS 3 Units at 11/09/21 0815    glucose chewable tablet 16 g  4 Tablet Oral PRN      dextrose (D50W) injection syrg 12.5-25 g  12.5-25 g IntraVENous PRN      glucagon (GLUCAGEN) injection 1 mg  1 mg IntraMUSCular PRN             Case discussed with:       Hany Gomez DO

## 2021-11-09 NOTE — DISCHARGE INSTRUCTIONS
ACUTE DIAGNOSES:  Diabetic foot ulcer ([facility] [C27.822, H24.868]    CHRONIC MEDICAL DIAGNOSES:  Problem List as of 11/9/2021 Date Reviewed: 8/5/2019          Codes Class Noted - Resolved    Arthritis ICD-10-CM: M19.90  ICD-9-CM: 716.90  Unknown - Present        DM type 2 causing vascular disease ([facility] ICD-10-CM: E11.59  ICD-9-CM: 250.70, 443.81  Unknown - Present        Prostate cancer ([facility] ICD-10-CM: C61  ICD-9-CM: 80  Unknown - Present        Von Willebrand disease ([facility] ICD-10-CM: D68.0  ICD-9-CM: 777. 4  Unknown - Present        Anxiety and depression ICD-10-CM: F41.9, F32. A  ICD-9-CM: 300.00, 311  Unknown - Present        Chronic pain ICD-10-CM: G89.29  ICD-9-CM: 338.29  Unknown - Present    Overview Signed 11/1/2021  7:56 AM by Kevin Comer MD     LEFT KNEE             Hyponatremia ICD-10-CM: E87.1  ICD-9-CM: 276.1  11/1/2021 - Present        CKD (chronic kidney disease), stage III ([facility] ICD-10-CM: N18.30  ICD-9-CM: 272. 3  Unknown - Present        DM type 2 causing renal disease ([facility] ICD-10-CM: E11.29  ICD-9-CM: 250.40  Unknown - Present        * (Principal) Diabetic foot ulcer ([facility] ICD-10-CM: E11.621, Y15.159  ICD-9-CM: 250.80, 707.15  10/31/2021 - Present        CAD (coronary artery disease) ICD-10-CM: I25.10  ICD-9-CM: 414.00  3/2018 - Present    Overview Signed 11/1/2021  7:56 AM by Kevin Comer MD     [place]AB[place], [place]             RESOLVED: Primary osteoarthritis of left knee ICD-10-CM: M17.12  ICD-9-CM: 715.16  8/5/2019 - 11/1/2021        RESOLVED: Primary osteoarthritis of right hip ICD-10-CM: M16.11  ICD-9-CM: 715.15  10/30/2017 - 11/1/2021              DISCHARGE MEDICATIONS:          · It is important that you take the medication exactly as they are prescribed. · Keep your medication in the bottles provided by the pharmacist and keep a list of the medication names, dosages, and times to be taken in your wallet. · Do not take other medications without consulting your doctor.        DIET: Diabetic Diet    ACTIVITY: Activity as tolerated    ADDITIONAL INFORMATION: If you experience any of the following symptoms then please call your primary care physician or return to the emergency room if you cannot get hold of your doctor: Fever, chills, nausea, vomiting, diarrhea, change in mentation, falling, bleeding, shortness of breath. FOLLOW UP CARE:  Dr. Tony Mahajan MD  you are to call and set up an appointment to see them in 5 days. Follow-up with Dr Blanca Howard in one week      Information obtained by :  I understand that if any problems occur once I am at home I am to contact my physician. I understand and acknowledge receipt of the instructions indicated above.                                                                                                                                            Physician's or R.N.'s Signature                                                                  Date/Time                                                                                                                                              Patient or Representative Signature                                                          Date/Time

## 2021-12-01 LAB — VWF MULTIMERS PPP IB: NORMAL

## 2021-12-03 ENCOUNTER — VIRTUAL VISIT (OUTPATIENT)
Dept: INFECTIOUS DISEASES | Age: 71
End: 2021-12-03
Payer: MEDICARE

## 2021-12-03 DIAGNOSIS — E11.628 DIABETIC FOOT INFECTION (HCC): Primary | ICD-10-CM

## 2021-12-03 DIAGNOSIS — L08.9 DIABETIC FOOT INFECTION (HCC): Primary | ICD-10-CM

## 2021-12-03 PROCEDURE — 99443 PR PHYS/QHP TELEPHONE EVALUATION 21-30 MIN: CPT | Performed by: INTERNAL MEDICINE

## 2021-12-03 NOTE — PROGRESS NOTES
Martha Keller is a 66 y.o. male, evaluated via audio-only technology on 12/3/2021 for Hospital Follow Up (Diagnosis: Diabetic foot infection) and Medication Evaluation (Daptomycin 800 mg IV daily through 12/18/2021 and Ceftriaxone 2 g IV daily through 12/18/2021)  . Assessment & Plan:   Right diabetic foot infection with osteomyelitis, abscess, and gangrene.  Status post I&D with first metatarsal and great toe amputation 11/1/2021.  Surgical cultures growing CoNS, diphtheroids, Proteus, and Enterococcus. On 6-week course of daptomycin and ceftriaxone planned to go through 12/18/2021. Patient to follow-up with me via phone next week to review labs    Thrombocytopenia. Possibly antibiotic related. Follow trend    Diarrhea. Patient states he is having approximately 2-3 loose stools a day. There is some form to the stools. No blood. He will contact me if frequency increases or if stools become watery at which time we will rule out C. Difficile    Diabetes mellitus.  Hemoglobin A1c 8.2    CKD.  Creatinine stable     History of von Willebrand's disease    12  Subjective:       Prior to Admission medications    Medication Sig Start Date End Date Taking? Authorizing Provider   L.acid,para-B. bifidum-S.therm (RISAQUAD) 8 billion cell cap cap Take 1 Capsule by mouth daily. 11/9/21  Yes Nick aGn MD   insulin glargine-lixisenatide (Soliqua 100/33) 100 unit-33 mcg/mL inpn 18 Units by SubCUTAneous route nightly. Yes Provider, Historical   pioglitazone (ACTOS) 15 mg tablet Take 15 mg by mouth daily. Yes Provider, Historical   aspirin delayed-release 81 mg tablet Take 81 mg by mouth daily. Yes Provider, Historical   diazePAM (VALIUM) 5 mg tablet Take 5 mg by mouth as needed for Anxiety. Yes Provider, Historical   simvastatin (ZOCOR) 40 mg tablet Take 40 mg by mouth nightly. Yes Provider, Historical   metFORMIN (GLUCOPHAGE) 500 mg tablet Take 500 mg by mouth two (2) times daily (with meals).    Yes Robles, MD Daryl   irbesartan (AVAPRO) 150 mg tablet Take 150 mg by mouth daily. IN AM   Yes Daryl Arboleda MD         ROS    No flowsheet data found. Armando Dorado, who was evaluated through a patient-initiated, synchronous (real-time) audio only encounter, and/or her healthcare decision maker, is aware that it is a billable service, with coverage as determined by his insurance carrier. He provided verbal consent to proceed: Yes. He has not had a related appointment within my department in the past 7 days or scheduled within the next 24 hours.         Gurdeep Torres DO

## 2021-12-03 NOTE — PROGRESS NOTES
Chief Complaint   Patient presents with   [facility] Follow Up     Diagnosis: Diabetic foot infection    Medication Evaluation     Daptomycin 800 mg IV daily through 12/18/2021 and Ceftriaxone 2 g IV daily through 12/18/2021

## 2022-03-19 PROBLEM — E11.621 DIABETIC FOOT ULCER (HCC): Status: ACTIVE | Noted: 2021-10-31

## 2022-03-19 PROBLEM — E87.1 HYPONATREMIA: Status: ACTIVE | Noted: 2021-11-01

## 2022-03-19 PROBLEM — L97.509 DIABETIC FOOT ULCER (HCC): Status: ACTIVE | Noted: 2021-10-31

## 2022-03-20 PROBLEM — I25.10 CAD (CORONARY ARTERY DISEASE): Status: ACTIVE | Noted: 2018-03-01

## 2023-05-22 NOTE — PROGRESS NOTES
Jd Swanson [facility]  [address] [facility], [address] [place] [address]  [phone]      Medical Progress Note      NAME: Christal Park   :  1950  MRM:  [id number]    Date/Time: 2021        Assessment / Plan:     67 yo M w/ hx of CAD, DM, von Williebrand disease, prostate CA presenting with R foot wound, found to have extensive DFI w/ osteomyelitis     Diabetic foot ulcer: osteomyelitis of the tibial hallux sesamoid. Gas forming cellulitis in the great toe. Plantar soft tissue ulceration has sinus tract from the ulcer to the tibial hallux sesamoid. Complete rupture of the FHL tendon, which may be secondarily infected. S/p I&D and great toe / first metatarsal amputation on . Intraoperative culture grew CoNS, diphtheroids, Proteus, and Enterococcus. IV abx deescalated from IV vanc, cefepime and Flagyl to Unasyn. ID following. Pathology report showed acute osteomyelitis extending to involve resection margin. Returned to OR today; podiatry on board. Anticipate possible need for home IV abx     DM type 2 causing renal, vascular and neurological disease: A1c 8.2. Poorly controlled. Increase Lantus to 30 units QHS. Cont SSI     CAD (coronary artery disease) / Hypertension: BP controlled. Cont ARB and statin. ASA on hold post-op as below     Von Willebrand disease: Monitor for bleeding. Holding ASA     JAMES: POA. Mild, Cr normalized. Cont ARB     Chronic pain / Arthritis: APAP PRN     Anxiety and depression:  Valium PRN     Hx of prostate cancer: Outpatient follow up      Total time spent: 25 minutes  Time spent in the care of this patient including reviewing records, discussing with nursing and/or other providers on the treatment team, obtaining history and examining the patient, and discussing treatment plans.                   Care Plan discussed with: Patient, Nursing Staff and >50% of time spent in counseling and coordination of care    Discussed:  Care Plan and D/C New order for from IN-Basket for Methotrexate 2.5mg tablets for 42 for 28 days.    Ran Test Claim-RTS 06/09/23    (No PA required at this time)    NOT a Lock-out     Pharmacy on file-  [facility]      Releasing to pharmacy     The patient can fill with [facility] or we will release to the patient's preferred pharmacy. The team will provide outreach to the patient and offer clinical services.          Planning    Prophylaxis:  Lovenox    Disposition:  Home w/Family vs SNF         Subjective:     Chief Complaint:  Follow up osteo    Chart/notes/labs/studies reviewed, patient examined. Eating lunch after surgery. Denies foot pain. No fevers          Objective:       Vitals:        Last 24hrs VS reviewed since prior progress note. Most recent are:    Visit Vitals  /82   Pulse 80   Temp 98.5 °F (36.9 °C)   Resp 18   Ht 5' 10.98\" (1.803 m)   Wt 104.3 kg (230 lb)   SpO2 96%   BMI 32.09 kg/m²     SpO2 Readings from Last 6 Encounters:   11/05/21 96%   08/09/19 98%   07/18/19 100%   05/24/19 97%   01/08/19 98%   10/31/17 95%    O2 Flow Rate (L/min): 2 l/min       Intake/Output Summary (Last 24 hours) at 11/5/2021 1431  Last data filed at 11/5/2021 1250  Gross per 24 hour   Intake 700 ml   Output 3175 ml   Net -2475 ml          Exam:     Physical Exam:    Gen:  Chronically ill-appearing. NAD  HEENT:  Atraumatic, normocephalic. Sclerae nonicteric, hearing intact to voice  Neck:  Supple, without apparent masses. Resp:  No accessory muscle use, CTAB without wheezes, rales, or rhonchi  Card: RRR, without m/r/g. No LE edema  Abd:  +bowel sounds, soft, NTTP, nondistended. No HSM  Neuro: Face symmetric, speech fluent, follows commands appropriately, no focal weakness or numbness  Psych:  Alert, oriented x 3.  Fair insight  SKIN: R foot dressing C/D/I     Medications Reviewed: (see below)    Lab Data Reviewed: (see below)    ______________________________________________________________________    Medications:     Current Facility-Administered Medications   Medication Dose Route Frequency    ampicillin-sulbactam (UNASYN) 3 g in 0.9% sodium chloride (MBP/ADV) 100 mL MBP  3 g IntraVENous Q6H    labetaloL (NORMODYNE;TRANDATE) 20 mg/4 mL (5 mg/mL) injection 10 mg  10 mg IntraVENous Q4H PRN    valsartan (DIOVAN) tablet 160 mg  160 mg Oral DAILY    insulin glargine (LANTUS) injection 25 Units  25 Units SubCUTAneous QHS    L.acidophilus-paracasei-S.thermophil-bifidobacter (RISAQUAD) 8 billion cell capsule  1 Capsule Oral DAILY    enoxaparin (LOVENOX) injection 40 mg  40 mg SubCUTAneous Q24H    [Held by provider] aspirin delayed-release tablet 81 mg  81 mg Oral DAILY    atorvastatin (LIPITOR) tablet 20 mg  20 mg Oral QHS    diazePAM (VALIUM) tablet 5 mg  5 mg Oral QHS PRN    sodium chloride (NS) flush 5-40 mL  5-40 mL IntraVENous Q8H    sodium chloride (NS) flush 5-40 mL  5-40 mL IntraVENous PRN    acetaminophen (TYLENOL) tablet 650 mg  650 mg Oral Q6H PRN    Or    acetaminophen (TYLENOL) suppository 650 mg  650 mg Rectal Q6H PRN    polyethylene glycol (MIRALAX) packet 17 g  17 g Oral DAILY PRN    ondansetron (ZOFRAN ODT) tablet 4 mg  4 mg Oral Q8H PRN    Or    ondansetron (ZOFRAN) injection 4 mg  4 mg IntraVENous Q6H PRN    HYDROmorphone (DILAUDID) injection 1 mg  1 mg IntraVENous Q6H PRN    insulin lispro (HUMALOG) injection   SubCUTAneous AC&HS    glucose chewable tablet 16 g  4 Tablet Oral PRN    dextrose (D50W) injection syrg 12.5-25 g  12.5-25 g IntraVENous PRN    glucagon (GLUCAGEN) injection 1 mg  1 mg IntraMUSCular PRN            Lab Review:     Recent Labs     11/05/21  0300 11/04/21  0559 11/03/21  0012   WBC 9.5 7.6 7.5   HGB 11.6* 10.7* 9.5*   HCT 35.6* 32.4* 28.7*    228 194     Recent Labs     11/05/21  0300 11/04/21  0559 11/03/21  0012   * 134* 133*   K 4.5 4.3 4.6   CL 98 102 104   CO2 29 26 22   * 263* 266*   BUN 14 11 13   CREA 0.84 0.87 0.94   CA 8.9 8.5 8.0*   MG 1.7 1.8 1.5*   PHOS 2.3* 2.4* 2.5*   ALB 2.3* 2.0* 2.0*   ALT 29  --  32     No components found for: GLPOC  No results for input(s): PH, PCO2, PO2, HCO3, FIO2 in the last 72 hours. No results for input(s): INR, INREXT, INREXT in the last 72 hours.   No results found for: SDES  Lab Results   Component Value Date/Time    Culture result: (A) 11/01/2021 08:28 PM     LIGHT ENTEROCOCCUS FAECALIS REFER TO [id number] FOR SUSCEPTIBILITIES    Culture result: (A) 11/01/2021 08:28 PM     LIGHT STAPHYLOCOCCUS SPECIES, COAGULASE NEGATIVE 2 COLONY TYPES/STRAINS    Culture result: LIGHT DIPHTHEROIDS (A) 11/01/2021 08:28 PM    Culture result: NO ANAEROBES ISOLATED 11/01/2021 08:28 PM              ___________________________________________________    Attending Physician: Michael Bennett MD

## 2023-05-29 RX ORDER — DIAZEPAM 5 MG/1
5 TABLET ORAL PRN
COMMUNITY

## 2023-05-29 RX ORDER — IRBESARTAN 150 MG/1
150 TABLET ORAL DAILY
COMMUNITY

## 2023-05-29 RX ORDER — SIMVASTATIN 40 MG
40 TABLET ORAL NIGHTLY
COMMUNITY

## 2023-05-29 RX ORDER — INSULIN GLARGINE AND LIXISENATIDE 100; 33 U/ML; UG/ML
18 INJECTION, SOLUTION SUBCUTANEOUS
COMMUNITY

## 2023-05-29 RX ORDER — ASPIRIN 81 MG/1
81 TABLET ORAL DAILY
COMMUNITY

## 2023-05-29 RX ORDER — PIOGLITAZONEHYDROCHLORIDE 15 MG/1
15 TABLET ORAL DAILY
COMMUNITY

## 2023-10-20 ENCOUNTER — APPOINTMENT (OUTPATIENT)
Facility: HOSPITAL | Age: 73
DRG: 629 | End: 2023-10-20
Payer: MEDICARE

## 2023-10-20 ENCOUNTER — HOSPITAL ENCOUNTER (INPATIENT)
Facility: HOSPITAL | Age: 73
LOS: 6 days | Discharge: HOME | DRG: 629 | End: 2023-10-26
Attending: EMERGENCY MEDICINE | Admitting: STUDENT IN AN ORGANIZED HEALTH CARE EDUCATION/TRAINING PROGRAM
Payer: MEDICARE

## 2023-10-20 DIAGNOSIS — M86.9 OSTEOMYELITIS OF SECOND TOE OF RIGHT FOOT (HCC): Primary | ICD-10-CM

## 2023-10-20 LAB
ALBUMIN SERPL-MCNC: 3.4 G/DL (ref 3.5–5)
ALBUMIN/GLOB SERPL: 0.9 (ref 1.1–2.2)
ALP SERPL-CCNC: 127 U/L (ref 45–117)
ALT SERPL-CCNC: 26 U/L (ref 12–78)
ANION GAP SERPL CALC-SCNC: 6 MMOL/L (ref 5–15)
AST SERPL-CCNC: 16 U/L (ref 15–37)
BASOPHILS # BLD: 0.1 K/UL (ref 0–0.1)
BASOPHILS NFR BLD: 1 % (ref 0–1)
BILIRUB SERPL-MCNC: 1.1 MG/DL (ref 0.2–1)
BUN SERPL-MCNC: 22 MG/DL (ref 6–20)
BUN/CREAT SERPL: 19 (ref 12–20)
CALCIUM SERPL-MCNC: 8.5 MG/DL (ref 8.5–10.1)
CHLORIDE SERPL-SCNC: 106 MMOL/L (ref 97–108)
CO2 SERPL-SCNC: 26 MMOL/L (ref 21–32)
COMMENT:: NORMAL
CREAT SERPL-MCNC: 1.18 MG/DL (ref 0.7–1.3)
CRP SERPL-MCNC: 1.93 MG/DL (ref 0–0.6)
DIFFERENTIAL METHOD BLD: ABNORMAL
EOSINOPHIL # BLD: 0.1 K/UL (ref 0–0.4)
EOSINOPHIL NFR BLD: 2 % (ref 0–7)
ERYTHROCYTE [DISTWIDTH] IN BLOOD BY AUTOMATED COUNT: 13.4 % (ref 11.5–14.5)
ERYTHROCYTE [SEDIMENTATION RATE] IN BLOOD: 41 MM/HR (ref 0–20)
GLOBULIN SER CALC-MCNC: 3.9 G/DL (ref 2–4)
GLUCOSE BLD STRIP.AUTO-MCNC: 239 MG/DL (ref 65–117)
GLUCOSE BLD STRIP.AUTO-MCNC: 241 MG/DL (ref 65–117)
GLUCOSE BLD STRIP.AUTO-MCNC: 262 MG/DL (ref 65–117)
GLUCOSE SERPL-MCNC: 271 MG/DL (ref 65–100)
HCT VFR BLD AUTO: 36.9 % (ref 36.6–50.3)
HGB BLD-MCNC: 12.5 G/DL (ref 12.1–17)
IMM GRANULOCYTES # BLD AUTO: 0 K/UL (ref 0–0.04)
IMM GRANULOCYTES NFR BLD AUTO: 0 % (ref 0–0.5)
LACTATE BLD-SCNC: 1.56 MMOL/L (ref 0.4–2)
LYMPHOCYTES # BLD: 1.1 K/UL (ref 0.8–3.5)
LYMPHOCYTES NFR BLD: 16 % (ref 12–49)
MCH RBC QN AUTO: 32.6 PG (ref 26–34)
MCHC RBC AUTO-ENTMCNC: 33.9 G/DL (ref 30–36.5)
MCV RBC AUTO: 96.3 FL (ref 80–99)
MONOCYTES # BLD: 0.8 K/UL (ref 0–1)
MONOCYTES NFR BLD: 12 % (ref 5–13)
NEUTS SEG # BLD: 4.7 K/UL (ref 1.8–8)
NEUTS SEG NFR BLD: 69 % (ref 32–75)
NRBC # BLD: 0 K/UL (ref 0–0.01)
NRBC BLD-RTO: 0 PER 100 WBC
PLATELET # BLD AUTO: 155 K/UL (ref 150–400)
PMV BLD AUTO: 11.8 FL (ref 8.9–12.9)
POTASSIUM SERPL-SCNC: 4.3 MMOL/L (ref 3.5–5.1)
PROT SERPL-MCNC: 7.3 G/DL (ref 6.4–8.2)
RBC # BLD AUTO: 3.83 M/UL (ref 4.1–5.7)
SERVICE CMNT-IMP: ABNORMAL
SODIUM SERPL-SCNC: 138 MMOL/L (ref 136–145)
SPECIMEN HOLD: NORMAL
WBC # BLD AUTO: 6.8 K/UL (ref 4.1–11.1)

## 2023-10-20 PROCEDURE — 0SPP04Z REMOVAL OF INTERNAL FIXATION DEVICE FROM RIGHT TOE PHALANGEAL JOINT, OPEN APPROACH: ICD-10-PCS | Performed by: PODIATRIST

## 2023-10-20 PROCEDURE — 6360000004 HC RX CONTRAST MEDICATION: Performed by: RADIOLOGY

## 2023-10-20 PROCEDURE — 96365 THER/PROPH/DIAG IV INF INIT: CPT

## 2023-10-20 PROCEDURE — 1100000000 HC RM PRIVATE

## 2023-10-20 PROCEDURE — 82962 GLUCOSE BLOOD TEST: CPT

## 2023-10-20 PROCEDURE — A9579 GAD-BASE MR CONTRAST NOS,1ML: HCPCS | Performed by: RADIOLOGY

## 2023-10-20 PROCEDURE — 83605 ASSAY OF LACTIC ACID: CPT

## 2023-10-20 PROCEDURE — 2580000003 HC RX 258: Performed by: STUDENT IN AN ORGANIZED HEALTH CARE EDUCATION/TRAINING PROGRAM

## 2023-10-20 PROCEDURE — 96375 TX/PRO/DX INJ NEW DRUG ADDON: CPT

## 2023-10-20 PROCEDURE — 6370000000 HC RX 637 (ALT 250 FOR IP): Performed by: STUDENT IN AN ORGANIZED HEALTH CARE EDUCATION/TRAINING PROGRAM

## 2023-10-20 PROCEDURE — 2580000003 HC RX 258: Performed by: EMERGENCY MEDICINE

## 2023-10-20 PROCEDURE — 85652 RBC SED RATE AUTOMATED: CPT

## 2023-10-20 PROCEDURE — 6360000002 HC RX W HCPCS: Performed by: STUDENT IN AN ORGANIZED HEALTH CARE EDUCATION/TRAINING PROGRAM

## 2023-10-20 PROCEDURE — 2580000003 HC RX 258: Performed by: INTERNAL MEDICINE

## 2023-10-20 PROCEDURE — 86140 C-REACTIVE PROTEIN: CPT

## 2023-10-20 PROCEDURE — 87040 BLOOD CULTURE FOR BACTERIA: CPT

## 2023-10-20 PROCEDURE — A4216 STERILE WATER/SALINE, 10 ML: HCPCS | Performed by: STUDENT IN AN ORGANIZED HEALTH CARE EDUCATION/TRAINING PROGRAM

## 2023-10-20 PROCEDURE — 99285 EMERGENCY DEPT VISIT HI MDM: CPT

## 2023-10-20 PROCEDURE — 6360000002 HC RX W HCPCS: Performed by: EMERGENCY MEDICINE

## 2023-10-20 PROCEDURE — 73720 MRI LWR EXTREMITY W/O&W/DYE: CPT

## 2023-10-20 PROCEDURE — 80053 COMPREHEN METABOLIC PANEL: CPT

## 2023-10-20 PROCEDURE — 85025 COMPLETE CBC W/AUTO DIFF WBC: CPT

## 2023-10-20 PROCEDURE — 36415 COLL VENOUS BLD VENIPUNCTURE: CPT

## 2023-10-20 RX ORDER — SODIUM CHLORIDE 0.9 % (FLUSH) 0.9 %
5-40 SYRINGE (ML) INJECTION EVERY 12 HOURS SCHEDULED
Status: DISCONTINUED | OUTPATIENT
Start: 2023-10-20 | End: 2023-10-26 | Stop reason: HOSPADM

## 2023-10-20 RX ORDER — ACETAMINOPHEN 325 MG/1
650 TABLET ORAL EVERY 6 HOURS PRN
Status: DISCONTINUED | OUTPATIENT
Start: 2023-10-20 | End: 2023-10-26 | Stop reason: HOSPADM

## 2023-10-20 RX ORDER — DEXTROSE MONOHYDRATE 100 MG/ML
INJECTION, SOLUTION INTRAVENOUS CONTINUOUS PRN
Status: DISCONTINUED | OUTPATIENT
Start: 2023-10-20 | End: 2023-10-26 | Stop reason: HOSPADM

## 2023-10-20 RX ORDER — SODIUM CHLORIDE 0.9 % (FLUSH) 0.9 %
5-40 SYRINGE (ML) INJECTION PRN
Status: DISCONTINUED | OUTPATIENT
Start: 2023-10-20 | End: 2023-10-26 | Stop reason: HOSPADM

## 2023-10-20 RX ORDER — DIAZEPAM 5 MG/1
5 TABLET ORAL NIGHTLY PRN
Status: DISCONTINUED | OUTPATIENT
Start: 2023-10-20 | End: 2023-10-26 | Stop reason: HOSPADM

## 2023-10-20 RX ORDER — ACETAMINOPHEN 650 MG/1
650 SUPPOSITORY RECTAL EVERY 6 HOURS PRN
Status: DISCONTINUED | OUTPATIENT
Start: 2023-10-20 | End: 2023-10-26 | Stop reason: HOSPADM

## 2023-10-20 RX ORDER — LATANOPROST 50 UG/ML
1 SOLUTION/ DROPS OPHTHALMIC NIGHTLY
Status: DISCONTINUED | OUTPATIENT
Start: 2023-10-20 | End: 2023-10-26 | Stop reason: HOSPADM

## 2023-10-20 RX ORDER — ENOXAPARIN SODIUM 100 MG/ML
40 INJECTION SUBCUTANEOUS DAILY
Status: DISCONTINUED | OUTPATIENT
Start: 2023-10-20 | End: 2023-10-20

## 2023-10-20 RX ORDER — ONDANSETRON 4 MG/1
4 TABLET, ORALLY DISINTEGRATING ORAL EVERY 8 HOURS PRN
Status: DISCONTINUED | OUTPATIENT
Start: 2023-10-20 | End: 2023-10-26 | Stop reason: HOSPADM

## 2023-10-20 RX ORDER — SODIUM CHLORIDE 9 MG/ML
INJECTION, SOLUTION INTRAVENOUS PRN
Status: DISCONTINUED | OUTPATIENT
Start: 2023-10-20 | End: 2023-10-26 | Stop reason: HOSPADM

## 2023-10-20 RX ORDER — METRONIDAZOLE 500 MG/100ML
500 INJECTION, SOLUTION INTRAVENOUS EVERY 8 HOURS
Status: DISCONTINUED | OUTPATIENT
Start: 2023-10-20 | End: 2023-10-24

## 2023-10-20 RX ORDER — ONDANSETRON 2 MG/ML
4 INJECTION INTRAMUSCULAR; INTRAVENOUS EVERY 6 HOURS PRN
Status: DISCONTINUED | OUTPATIENT
Start: 2023-10-20 | End: 2023-10-26 | Stop reason: HOSPADM

## 2023-10-20 RX ORDER — INSULIN LISPRO 100 [IU]/ML
0-4 INJECTION, SOLUTION INTRAVENOUS; SUBCUTANEOUS NIGHTLY
Status: DISCONTINUED | OUTPATIENT
Start: 2023-10-20 | End: 2023-10-26 | Stop reason: HOSPADM

## 2023-10-20 RX ORDER — ASPIRIN 81 MG/1
81 TABLET, CHEWABLE ORAL DAILY
Status: DISCONTINUED | OUTPATIENT
Start: 2023-10-21 | End: 2023-10-26 | Stop reason: HOSPADM

## 2023-10-20 RX ORDER — TIMOLOL MALEATE 5 MG/ML
1 SOLUTION/ DROPS OPHTHALMIC 2 TIMES DAILY
Status: DISCONTINUED | OUTPATIENT
Start: 2023-10-20 | End: 2023-10-20

## 2023-10-20 RX ORDER — OXYCODONE HYDROCHLORIDE 5 MG/1
5 TABLET ORAL EVERY 4 HOURS PRN
Status: DISCONTINUED | OUTPATIENT
Start: 2023-10-20 | End: 2023-10-26 | Stop reason: HOSPADM

## 2023-10-20 RX ORDER — ATORVASTATIN CALCIUM 10 MG/1
10 TABLET, FILM COATED ORAL NIGHTLY
Status: DISCONTINUED | OUTPATIENT
Start: 2023-10-20 | End: 2023-10-26 | Stop reason: HOSPADM

## 2023-10-20 RX ORDER — INSULIN LISPRO 100 [IU]/ML
0-8 INJECTION, SOLUTION INTRAVENOUS; SUBCUTANEOUS
Status: DISCONTINUED | OUTPATIENT
Start: 2023-10-20 | End: 2023-10-26 | Stop reason: HOSPADM

## 2023-10-20 RX ORDER — POLYETHYLENE GLYCOL 3350 17 G/17G
17 POWDER, FOR SOLUTION ORAL DAILY PRN
Status: DISCONTINUED | OUTPATIENT
Start: 2023-10-20 | End: 2023-10-26 | Stop reason: HOSPADM

## 2023-10-20 RX ORDER — SODIUM CHLORIDE 9 MG/ML
INJECTION, SOLUTION INTRAVENOUS CONTINUOUS
Status: DISCONTINUED | OUTPATIENT
Start: 2023-10-20 | End: 2023-10-26 | Stop reason: HOSPADM

## 2023-10-20 RX ORDER — TIMOLOL MALEATE 5 MG/ML
1 SOLUTION/ DROPS OPHTHALMIC DAILY
Status: DISCONTINUED | OUTPATIENT
Start: 2023-10-21 | End: 2023-10-26 | Stop reason: HOSPADM

## 2023-10-20 RX ORDER — BUTALBITAL, ACETAMINOPHEN AND CAFFEINE 50; 325; 40 MG/1; MG/1; MG/1
1 TABLET ORAL EVERY 4 HOURS PRN
Status: DISCONTINUED | OUTPATIENT
Start: 2023-10-20 | End: 2023-10-26 | Stop reason: HOSPADM

## 2023-10-20 RX ORDER — 0.9 % SODIUM CHLORIDE 0.9 %
1000 INTRAVENOUS SOLUTION INTRAVENOUS ONCE
Status: COMPLETED | OUTPATIENT
Start: 2023-10-20 | End: 2023-10-20

## 2023-10-20 RX ORDER — INSULIN GLARGINE 100 [IU]/ML
0.25 INJECTION, SOLUTION SUBCUTANEOUS NIGHTLY
Status: DISCONTINUED | OUTPATIENT
Start: 2023-10-20 | End: 2023-10-26 | Stop reason: HOSPADM

## 2023-10-20 RX ADMIN — VANCOMYCIN HYDROCHLORIDE 2500 MG: 10 INJECTION, POWDER, LYOPHILIZED, FOR SOLUTION INTRAVENOUS at 13:09

## 2023-10-20 RX ADMIN — INSULIN LISPRO 4 UNITS: 100 INJECTION, SOLUTION INTRAVENOUS; SUBCUTANEOUS at 17:17

## 2023-10-20 RX ADMIN — DAPTOMYCIN 800 MG: 500 INJECTION, POWDER, LYOPHILIZED, FOR SOLUTION INTRAVENOUS at 18:17

## 2023-10-20 RX ADMIN — SODIUM CHLORIDE: 9 INJECTION, SOLUTION INTRAVENOUS at 23:03

## 2023-10-20 RX ADMIN — SODIUM CHLORIDE, PRESERVATIVE FREE 10 ML: 5 INJECTION INTRAVENOUS at 20:32

## 2023-10-20 RX ADMIN — WATER 2000 MG: 1 INJECTION INTRAMUSCULAR; INTRAVENOUS; SUBCUTANEOUS at 13:08

## 2023-10-20 RX ADMIN — ACETAMINOPHEN 650 MG: 325 TABLET ORAL at 20:22

## 2023-10-20 RX ADMIN — INSULIN GLARGINE 25 UNITS: 100 INJECTION, SOLUTION SUBCUTANEOUS at 22:59

## 2023-10-20 RX ADMIN — SODIUM CHLORIDE 1000 ML: 9 INJECTION, SOLUTION INTRAVENOUS at 14:14

## 2023-10-20 RX ADMIN — METRONIDAZOLE 500 MG: 500 INJECTION, SOLUTION INTRAVENOUS at 17:09

## 2023-10-20 RX ADMIN — DIAZEPAM 5 MG: 5 TABLET ORAL at 22:59

## 2023-10-20 RX ADMIN — CEFEPIME 2000 MG: 2 INJECTION, POWDER, FOR SOLUTION INTRAVENOUS at 20:22

## 2023-10-20 RX ADMIN — GADOTERIDOL 19 ML: 279.3 INJECTION, SOLUTION INTRAVENOUS at 21:53

## 2023-10-20 ASSESSMENT — PAIN DESCRIPTION - DESCRIPTORS: DESCRIPTORS: ACHING

## 2023-10-20 ASSESSMENT — PAIN SCALES - GENERAL
PAINLEVEL_OUTOF10: 0

## 2023-10-20 ASSESSMENT — PAIN DESCRIPTION - LOCATION: LOCATION: HEAD

## 2023-10-20 ASSESSMENT — PAIN - FUNCTIONAL ASSESSMENT: PAIN_FUNCTIONAL_ASSESSMENT: 0-10

## 2023-10-20 NOTE — ED TRIAGE NOTES
Pt reports signs of infection in R foot x 2 weeks and needs IV abx. Pt denies pain or fever but endorses RLE swelling.  Pt reports hx of toe amputation and hx of DM, MI, and bipass surgery

## 2023-10-20 NOTE — ED PROVIDER NOTES
Consult for Admission  1:41 PM    ED Room Number: OFLW/OFL2  Patient Name and age: Jan Ayersop 74 y.o.  male  Working Diagnosis:   1. Osteomyelitis of second toe of right foot ([address])        COVID-19 Suspicion: No  Sepsis present:  No  Reassessment needed: No  Code Status:  Full Code  Readmission: No  Isolation Requirements: no  Recommended Level of Care: med/surg  Department: [facility] ED - [phone]      Other: 73-year-old male with history of prior osteomyelitis of right great toe presents with increased redness and drainage from chronic right foot wound and x-ray done yesterday outpatient showing osteomyelitis. Afebrile with vital signs stable no distress. Ordered IV ABX.         REASSESSMENT          CONSULTS:  IP CONSULT TO HOSPITALIST    PROCEDURES:     Procedures            (Please note that portions of this note were completed with a voice recognition program.  Efforts were made to edit the dictations but occasionally words are mis-transcribed.)    Juana Cranker, DO (electronically signed)  Emergency Attending Physician              Elizabeth Garzon DO  10/20/23 9811

## 2023-10-20 NOTE — ED NOTES
Pt reports that he feels \"faint\". RN notices pt is pale and drowsy. Pt reports he has not eaten all day, so RN checks pt's BP, HR, SPO2, and blood sugar. Blood sugar 241 but BP is low. RN called an ER provider to bedside. ER provider advises RN begin NS bolus. Pt reports the faint feeling started shortly after RN started vancomycin. RN stopped vancomycin infusion and began NS bolus. RN advised charge RN and other RNs assisted pt to wheelchair and then to stretcher in ER room.  Pt's blood pressure is back to baseline     Rom Negro RN  10/20/23 2567

## 2023-10-20 NOTE — H&P
[unfilled]    IMAGING:   MRI FOOT RIGHT W WO CONTRAST    (Results Pending)        ECG/ECHO:  [unfilled]       Notes reviewed from all clinical/nonclinical/nursing services involved in patient's clinical care. Care coordination discussions were held with appropriate clinical/nonclinical/ nursing providers based on care coordination needs. Assessment and Plan :   Given the patient's current clinical presentation, there is a high level of concern for decompensation if discharged from the emergency department. Complex decision making was performed, which includes reviewing the patient's available past medical records, laboratory results, and imaging studies. Principal Problem:    Osteomyelitis ([address])  Resolved Problems:    * No resolved hospital problems. *      Osteomyelitis of right foot  Diabetic foot ulcer, right  Noted to have osteomyelitis of second toe on x-ray done as outpatient. History of diabetes, A1c 5.8 per patient.    -Podiatry consulted  -MRI right foot  -Daptomycin cefepime metronidazole  -Blood glucose control target 140-180    Transient hypotension  Noted to have hypotension into the 60s when vancomycin was initially infusing in the ER. Blood pressure responded rapidly to fluid bolus.  -Listed vancomycin as intolerance      Type 2 diabetes with hyperglycemia  Reports A1c of 5.8 controlled with Actos. Was previously on insulin.   -Lantus  - Lispro correctional scale, FSG AC HS  - Consistent carb diet, hypoglycemia protocol. Von Willebrand disease  - SCD for DVT prophylaxis    Primary open-angle glaucoma  - Latanoprost and timolol    Hyperlipidemia  Continue statin    Anxiety  Valium 5 mg nightly as needed    CAD status post four-vessel CABG  - Continue aspirin    Knee osteoarthritis  As needed Tylenol      DIET: ADULT DIET;  Regular; 4 carb choices (60 gm/meal)   ISOLATION PRECAUTIONS: No active isolations  CODE STATUS: [unfilled]   DVT PROPHYLAXIS: SCD  FUNCTIONAL STATUS PRIOR

## 2023-10-20 NOTE — ED NOTES
TRANSFER - OUT REPORT:    Verbal report given to [address] [place] [address]  on Velia Mcconnell  being transferred to [address] [place] [address] for routine progression of patient care       Report consisted of patient's Situation, Background, Assessment and   Recommendations(SBAR). Information from the following report(s) Nurse Handoff Report, Index, ED Encounter Summary, and ED SBAR was reviewed with the receiving nurse. [place] Assessment:    Presents to emergency department  because of falls (Syncope, seizure, or loss of consciousness): No  Age > 70: Yes  Altered Mental Status, Intoxication with alcohol or substance confusion (Disorientation, impaired judgment, poor safety awaremess, or inability to follow instructions): No  Impaired Mobility: Ambulates or transfers with assistive devices or assistance; Unable to ambulate or transer.: Yes  Nursing Judgement: Yes          Lines:   Peripheral IV 10/20/23 Posterior;Right Forearm (Active)        Opportunity for questions and clarification was provided.       Patient transported with:  Hortencia Goldman RN  10/20/23 9512

## 2023-10-21 ENCOUNTER — APPOINTMENT (OUTPATIENT)
Facility: HOSPITAL | Age: 73
DRG: 629 | End: 2023-10-21
Payer: MEDICARE

## 2023-10-21 LAB
ANION GAP SERPL CALC-SCNC: 5 MMOL/L (ref 5–15)
BASOPHILS # BLD: 0.1 K/UL (ref 0–0.1)
BASOPHILS NFR BLD: 1 % (ref 0–1)
BUN SERPL-MCNC: 19 MG/DL (ref 6–20)
BUN/CREAT SERPL: 18 (ref 12–20)
CALCIUM SERPL-MCNC: 7.7 MG/DL (ref 8.5–10.1)
CHLORIDE SERPL-SCNC: 107 MMOL/L (ref 97–108)
CK SERPL-CCNC: 135 U/L (ref 39–308)
CO2 SERPL-SCNC: 23 MMOL/L (ref 21–32)
CREAT SERPL-MCNC: 1.06 MG/DL (ref 0.7–1.3)
CRP SERPL-MCNC: 2.97 MG/DL (ref 0–0.6)
DIFFERENTIAL METHOD BLD: ABNORMAL
EOSINOPHIL # BLD: 0.2 K/UL (ref 0–0.4)
EOSINOPHIL NFR BLD: 2 % (ref 0–7)
ERYTHROCYTE [DISTWIDTH] IN BLOOD BY AUTOMATED COUNT: 13.3 % (ref 11.5–14.5)
EST. AVERAGE GLUCOSE BLD GHB EST-MCNC: 180 MG/DL
GLUCOSE BLD STRIP.AUTO-MCNC: 147 MG/DL (ref 65–117)
GLUCOSE BLD STRIP.AUTO-MCNC: 219 MG/DL (ref 65–117)
GLUCOSE BLD STRIP.AUTO-MCNC: 229 MG/DL (ref 65–117)
GLUCOSE BLD STRIP.AUTO-MCNC: 233 MG/DL (ref 65–117)
GLUCOSE SERPL-MCNC: 283 MG/DL (ref 65–100)
HBA1C MFR BLD: 7.9 % (ref 4–5.6)
HCT VFR BLD AUTO: 33.8 % (ref 36.6–50.3)
HGB BLD-MCNC: 11.3 G/DL (ref 12.1–17)
IMM GRANULOCYTES # BLD AUTO: 0 K/UL (ref 0–0.04)
IMM GRANULOCYTES NFR BLD AUTO: 0 % (ref 0–0.5)
LYMPHOCYTES # BLD: 1.3 K/UL (ref 0.8–3.5)
LYMPHOCYTES NFR BLD: 16 % (ref 12–49)
MAGNESIUM SERPL-MCNC: 1.6 MG/DL (ref 1.6–2.4)
MCH RBC QN AUTO: 32.5 PG (ref 26–34)
MCHC RBC AUTO-ENTMCNC: 33.4 G/DL (ref 30–36.5)
MCV RBC AUTO: 97.1 FL (ref 80–99)
MONOCYTES # BLD: 0.7 K/UL (ref 0–1)
MONOCYTES NFR BLD: 9 % (ref 5–13)
NEUTS SEG # BLD: 5.8 K/UL (ref 1.8–8)
NEUTS SEG NFR BLD: 72 % (ref 32–75)
NRBC # BLD: 0 K/UL (ref 0–0.01)
NRBC BLD-RTO: 0 PER 100 WBC
PHOSPHATE SERPL-MCNC: 2.9 MG/DL (ref 2.6–4.7)
PLATELET # BLD AUTO: 135 K/UL (ref 150–400)
PMV BLD AUTO: 12.2 FL (ref 8.9–12.9)
POTASSIUM SERPL-SCNC: 4.1 MMOL/L (ref 3.5–5.1)
RBC # BLD AUTO: 3.48 M/UL (ref 4.1–5.7)
SERVICE CMNT-IMP: ABNORMAL
SODIUM SERPL-SCNC: 135 MMOL/L (ref 136–145)
WBC # BLD AUTO: 8.1 K/UL (ref 4.1–11.1)

## 2023-10-21 PROCEDURE — 87205 SMEAR GRAM STAIN: CPT

## 2023-10-21 PROCEDURE — 6360000002 HC RX W HCPCS: Performed by: STUDENT IN AN ORGANIZED HEALTH CARE EDUCATION/TRAINING PROGRAM

## 2023-10-21 PROCEDURE — 80048 BASIC METABOLIC PNL TOTAL CA: CPT

## 2023-10-21 PROCEDURE — 87077 CULTURE AEROBIC IDENTIFY: CPT

## 2023-10-21 PROCEDURE — 1100000000 HC RM PRIVATE

## 2023-10-21 PROCEDURE — 87075 CULTR BACTERIA EXCEPT BLOOD: CPT

## 2023-10-21 PROCEDURE — 85025 COMPLETE CBC W/AUTO DIFF WBC: CPT

## 2023-10-21 PROCEDURE — 83036 HEMOGLOBIN GLYCOSYLATED A1C: CPT

## 2023-10-21 PROCEDURE — 99223 1ST HOSP IP/OBS HIGH 75: CPT | Performed by: PODIATRIST

## 2023-10-21 PROCEDURE — 87070 CULTURE OTHR SPECIMN AEROBIC: CPT

## 2023-10-21 PROCEDURE — 84100 ASSAY OF PHOSPHORUS: CPT

## 2023-10-21 PROCEDURE — 2580000003 HC RX 258: Performed by: STUDENT IN AN ORGANIZED HEALTH CARE EDUCATION/TRAINING PROGRAM

## 2023-10-21 PROCEDURE — 82550 ASSAY OF CK (CPK): CPT

## 2023-10-21 PROCEDURE — 87186 SC STD MICRODIL/AGAR DIL: CPT

## 2023-10-21 PROCEDURE — 2580000003 HC RX 258: Performed by: INTERNAL MEDICINE

## 2023-10-21 PROCEDURE — 6370000000 HC RX 637 (ALT 250 FOR IP): Performed by: INTERNAL MEDICINE

## 2023-10-21 PROCEDURE — 6370000000 HC RX 637 (ALT 250 FOR IP): Performed by: STUDENT IN AN ORGANIZED HEALTH CARE EDUCATION/TRAINING PROGRAM

## 2023-10-21 PROCEDURE — 86140 C-REACTIVE PROTEIN: CPT

## 2023-10-21 PROCEDURE — 36415 COLL VENOUS BLD VENIPUNCTURE: CPT

## 2023-10-21 PROCEDURE — 94761 N-INVAS EAR/PLS OXIMETRY MLT: CPT

## 2023-10-21 PROCEDURE — A4216 STERILE WATER/SALINE, 10 ML: HCPCS | Performed by: STUDENT IN AN ORGANIZED HEALTH CARE EDUCATION/TRAINING PROGRAM

## 2023-10-21 PROCEDURE — 83735 ASSAY OF MAGNESIUM: CPT

## 2023-10-21 PROCEDURE — 82962 GLUCOSE BLOOD TEST: CPT

## 2023-10-21 RX ORDER — LOSARTAN POTASSIUM 25 MG/1
25 TABLET ORAL DAILY
Status: DISCONTINUED | OUTPATIENT
Start: 2023-10-21 | End: 2023-10-26 | Stop reason: HOSPADM

## 2023-10-21 RX ADMIN — DAPTOMYCIN 800 MG: 500 INJECTION, POWDER, LYOPHILIZED, FOR SOLUTION INTRAVENOUS at 17:42

## 2023-10-21 RX ADMIN — METRONIDAZOLE 500 MG: 500 INJECTION, SOLUTION INTRAVENOUS at 08:49

## 2023-10-21 RX ADMIN — METRONIDAZOLE 500 MG: 500 INJECTION, SOLUTION INTRAVENOUS at 16:22

## 2023-10-21 RX ADMIN — SODIUM CHLORIDE, PRESERVATIVE FREE 10 ML: 5 INJECTION INTRAVENOUS at 20:26

## 2023-10-21 RX ADMIN — INSULIN LISPRO 2 UNITS: 100 INJECTION, SOLUTION INTRAVENOUS; SUBCUTANEOUS at 12:32

## 2023-10-21 RX ADMIN — SODIUM CHLORIDE: 9 INJECTION, SOLUTION INTRAVENOUS at 20:30

## 2023-10-21 RX ADMIN — INSULIN LISPRO 2 UNITS: 100 INJECTION, SOLUTION INTRAVENOUS; SUBCUTANEOUS at 16:32

## 2023-10-21 RX ADMIN — SODIUM CHLORIDE, PRESERVATIVE FREE 10 ML: 5 INJECTION INTRAVENOUS at 08:49

## 2023-10-21 RX ADMIN — CEFEPIME 2000 MG: 2 INJECTION, POWDER, FOR SOLUTION INTRAVENOUS at 20:25

## 2023-10-21 RX ADMIN — LOSARTAN POTASSIUM 25 MG: 25 TABLET, FILM COATED ORAL at 08:52

## 2023-10-21 RX ADMIN — ACETAMINOPHEN 650 MG: 325 TABLET ORAL at 09:01

## 2023-10-21 RX ADMIN — CEFEPIME 2000 MG: 2 INJECTION, POWDER, FOR SOLUTION INTRAVENOUS at 12:32

## 2023-10-21 RX ADMIN — POLYETHYLENE GLYCOL 3350 17 G: 17 POWDER, FOR SOLUTION ORAL at 13:00

## 2023-10-21 RX ADMIN — METRONIDAZOLE 500 MG: 500 INJECTION, SOLUTION INTRAVENOUS at 01:39

## 2023-10-21 RX ADMIN — CEFEPIME 2000 MG: 2 INJECTION, POWDER, FOR SOLUTION INTRAVENOUS at 05:16

## 2023-10-21 RX ADMIN — INSULIN GLARGINE 25 UNITS: 100 INJECTION, SOLUTION SUBCUTANEOUS at 20:27

## 2023-10-21 RX ADMIN — ASPIRIN 81 MG: 81 TABLET, CHEWABLE ORAL at 08:49

## 2023-10-21 RX ADMIN — ACETAMINOPHEN 650 MG: 325 TABLET ORAL at 20:26

## 2023-10-21 ASSESSMENT — ENCOUNTER SYMPTOMS
ABDOMINAL PAIN: 0
SHORTNESS OF BREATH: 0
DIARRHEA: 0
VOMITING: 0

## 2023-10-21 ASSESSMENT — PAIN SCALES - GENERAL
PAINLEVEL_OUTOF10: 0
PAINLEVEL_OUTOF10: 0
PAINLEVEL_OUTOF10: 3
PAINLEVEL_OUTOF10: 0

## 2023-10-21 ASSESSMENT — PAIN DESCRIPTION - DESCRIPTORS
DESCRIPTORS: ACHING
DESCRIPTORS: DULL
DESCRIPTORS: ACHING

## 2023-10-21 ASSESSMENT — PAIN DESCRIPTION - LOCATION
LOCATION: HEAD

## 2023-10-21 NOTE — ACP (ADVANCE CARE PLANNING)
Advance Care Planning     Advance Care Planning Inpatient Note  Spiritual Care Department    Today's Date: 10/21/2023  Unit: [facility] B5 MULTI-SPECIALTY ONCOLOGY 2    Received request from IDT Member. Upon review of chart and communication with care team, patient's decision making abilities are not in question. . Patient and   was/were present in the room during visit. Goals of ACP Conversation:  Discuss advance care planning documents    Health Care Decision Makers:       Primary Decision Maker: Gideon Ploo [facility] [phone]  Summary:  Completed New Documents  Updated Healthcare Decision [facility]    Advance Care Planning Documents (Patient Wishes):  Healthcare Power of /Advance Directive Appointment of [address] [facility] [address]  Living Will/Advance Directive  Anatomical Gift/Organ Donation     Assessment:  Chart reviewed. I followed up with patient Kiera García to assist him in completing new advance directive, raul care instructions, and anatomical gifts. The patient has decided to name Sanaz Leblanc to be his primary decision maker. Gideon's contact phone number is [phone]. The patient has two estranged children these past two years due to family dispute. The patient's children want the patient to stay at a skilled nursing facility. The patient disagrees. Due to that disagreement the patient's kid's are not talking with their father. The patient is  and has two children. Mr. Rao Hudson lives by himself and is very independent. He is of the [address] tradition and attends the [employer]. The patient's sister entered the hospital room. I provided assistance in helping the patient complete a new advance directive.     Interventions:  Provided education on documents for clarity and greater understanding  Assisted in the completion of documents according to patient's wishes at this time    Care Preferences Communicated:   No    Outcomes/Plan:  ACP Discussion: Completed  New

## 2023-10-21 NOTE — ACP (ADVANCE CARE PLANNING)
Advance Care Planning     Advance Care Planning Inpatient Note  Spiritual Care Department    Today's Date: 10/21/2023  Unit: [facility] B5 MULTI-SPECIALTY ONCOLOGY 2    Received request from IDT Member. Upon review of chart and communication with care team, patient's decision making abilities are not in question. . Patient and nurse,   was/were present in the room during visit. Goals of ACP Conversation:  None    Health Care Decision Makers:       Primary Decision Maker: Gideon Polo [facility] [phone]  Summary:    Advance Care Planning Documents (Patient Wishes):  Pt is getting information to complete advance directive. Assessment:  Chart reviewed. I received a spiritual consult request to assist patient in completing advance medical directive (AMD). Verbally, the patient named Fatoumata Gomez wants Kellie Arammon to be his primary decision maker. The patient is estranged from his children for the last two years. The patient's children want the patient to stay at a skilled nursing facility. The patient disagrees. Due to that disagreement the patient's kid's are not talking with their father. The patient is  and has two children. Mr. Benjy Stewart lives by himself and is very independent. He is of the [place] tradition and attends [address] . The patient shared his life legacy and and his historical and medical events that he worries about. The patient is still grieving the loss of his spouse. He shared with me that he is afraid of loosing his toe due to amputation. I listened and leaned in the conversation. I provided a non anxious/non judgmental presence. I provided wellness prayers for the patient which brought him comfort. I left the ACP paperwork with the patient to get the primary agent's address. The patient will notify the nurse to page the  when to discuss further of the ACP paperwork.        Interventions:  Provided education on documents for clarity and greater

## 2023-10-22 LAB
GLUCOSE BLD STRIP.AUTO-MCNC: 150 MG/DL (ref 65–117)
GLUCOSE BLD STRIP.AUTO-MCNC: 213 MG/DL (ref 65–117)
GLUCOSE BLD STRIP.AUTO-MCNC: 228 MG/DL (ref 65–117)
GLUCOSE BLD STRIP.AUTO-MCNC: 273 MG/DL (ref 65–117)
SERVICE CMNT-IMP: ABNORMAL

## 2023-10-22 PROCEDURE — 2580000003 HC RX 258: Performed by: STUDENT IN AN ORGANIZED HEALTH CARE EDUCATION/TRAINING PROGRAM

## 2023-10-22 PROCEDURE — A4216 STERILE WATER/SALINE, 10 ML: HCPCS | Performed by: STUDENT IN AN ORGANIZED HEALTH CARE EDUCATION/TRAINING PROGRAM

## 2023-10-22 PROCEDURE — 6370000000 HC RX 637 (ALT 250 FOR IP): Performed by: STUDENT IN AN ORGANIZED HEALTH CARE EDUCATION/TRAINING PROGRAM

## 2023-10-22 PROCEDURE — 6360000002 HC RX W HCPCS: Performed by: STUDENT IN AN ORGANIZED HEALTH CARE EDUCATION/TRAINING PROGRAM

## 2023-10-22 PROCEDURE — 6370000000 HC RX 637 (ALT 250 FOR IP): Performed by: INTERNAL MEDICINE

## 2023-10-22 PROCEDURE — 82962 GLUCOSE BLOOD TEST: CPT

## 2023-10-22 PROCEDURE — 1100000000 HC RM PRIVATE

## 2023-10-22 PROCEDURE — 94761 N-INVAS EAR/PLS OXIMETRY MLT: CPT

## 2023-10-22 RX ADMIN — METRONIDAZOLE 500 MG: 500 INJECTION, SOLUTION INTRAVENOUS at 09:05

## 2023-10-22 RX ADMIN — INSULIN GLARGINE 25 UNITS: 100 INJECTION, SOLUTION SUBCUTANEOUS at 20:30

## 2023-10-22 RX ADMIN — ASPIRIN 81 MG: 81 TABLET, CHEWABLE ORAL at 09:04

## 2023-10-22 RX ADMIN — INSULIN LISPRO 2 UNITS: 100 INJECTION, SOLUTION INTRAVENOUS; SUBCUTANEOUS at 11:23

## 2023-10-22 RX ADMIN — CEFEPIME 2000 MG: 2 INJECTION, POWDER, FOR SOLUTION INTRAVENOUS at 13:00

## 2023-10-22 RX ADMIN — METRONIDAZOLE 500 MG: 500 INJECTION, SOLUTION INTRAVENOUS at 17:01

## 2023-10-22 RX ADMIN — SODIUM CHLORIDE, PRESERVATIVE FREE 10 ML: 5 INJECTION INTRAVENOUS at 20:30

## 2023-10-22 RX ADMIN — SODIUM CHLORIDE, PRESERVATIVE FREE 10 ML: 5 INJECTION INTRAVENOUS at 09:06

## 2023-10-22 RX ADMIN — ACETAMINOPHEN 650 MG: 325 TABLET ORAL at 13:06

## 2023-10-22 RX ADMIN — LOSARTAN POTASSIUM 25 MG: 25 TABLET, FILM COATED ORAL at 09:04

## 2023-10-22 RX ADMIN — CEFEPIME 2000 MG: 2 INJECTION, POWDER, FOR SOLUTION INTRAVENOUS at 20:31

## 2023-10-22 RX ADMIN — INSULIN LISPRO 2 UNITS: 100 INJECTION, SOLUTION INTRAVENOUS; SUBCUTANEOUS at 17:00

## 2023-10-22 RX ADMIN — METRONIDAZOLE 500 MG: 500 INJECTION, SOLUTION INTRAVENOUS at 00:35

## 2023-10-22 RX ADMIN — DAPTOMYCIN 800 MG: 500 INJECTION, POWDER, LYOPHILIZED, FOR SOLUTION INTRAVENOUS at 18:19

## 2023-10-22 RX ADMIN — CEFEPIME 2000 MG: 2 INJECTION, POWDER, FOR SOLUTION INTRAVENOUS at 04:39

## 2023-10-22 ASSESSMENT — PAIN SCALES - GENERAL
PAINLEVEL_OUTOF10: 0
PAINLEVEL_OUTOF10: 3
PAINLEVEL_OUTOF10: 0

## 2023-10-22 ASSESSMENT — PAIN DESCRIPTION - DESCRIPTORS: DESCRIPTORS: ACHING

## 2023-10-22 ASSESSMENT — PAIN DESCRIPTION - LOCATION: LOCATION: HEAD

## 2023-10-23 ENCOUNTER — ANESTHESIA (OUTPATIENT)
Facility: HOSPITAL | Age: 73
DRG: 629 | End: 2023-10-23
Payer: MEDICARE

## 2023-10-23 ENCOUNTER — ANESTHESIA EVENT (OUTPATIENT)
Facility: HOSPITAL | Age: 73
DRG: 629 | End: 2023-10-23
Payer: MEDICARE

## 2023-10-23 PROBLEM — E11.42 DIABETIC POLYNEUROPATHY ASSOCIATED WITH TYPE 2 DIABETES MELLITUS (HCC): Status: ACTIVE | Noted: 2023-10-23

## 2023-10-23 LAB
ANION GAP SERPL CALC-SCNC: 4 MMOL/L (ref 5–15)
BACTERIA SPEC CULT: ABNORMAL
BACTERIA SPEC CULT: ABNORMAL
BACTERIA SPEC CULT: NORMAL
BUN SERPL-MCNC: 19 MG/DL (ref 6–20)
BUN/CREAT SERPL: 19 (ref 12–20)
CALCIUM SERPL-MCNC: 8.3 MG/DL (ref 8.5–10.1)
CHLORIDE SERPL-SCNC: 109 MMOL/L (ref 97–108)
CO2 SERPL-SCNC: 24 MMOL/L (ref 21–32)
CREAT SERPL-MCNC: 1.01 MG/DL (ref 0.7–1.3)
CRP SERPL-MCNC: 1.84 MG/DL (ref 0–0.6)
ERYTHROCYTE [DISTWIDTH] IN BLOOD BY AUTOMATED COUNT: 13.5 % (ref 11.5–14.5)
GLUCOSE BLD STRIP.AUTO-MCNC: 114 MG/DL (ref 65–117)
GLUCOSE BLD STRIP.AUTO-MCNC: 142 MG/DL (ref 65–117)
GLUCOSE BLD STRIP.AUTO-MCNC: 164 MG/DL (ref 65–117)
GLUCOSE BLD STRIP.AUTO-MCNC: 174 MG/DL (ref 65–117)
GLUCOSE BLD STRIP.AUTO-MCNC: 206 MG/DL (ref 65–117)
GLUCOSE BLD STRIP.AUTO-MCNC: 239 MG/DL (ref 65–117)
GLUCOSE SERPL-MCNC: 253 MG/DL (ref 65–100)
GRAM STN SPEC: ABNORMAL
HCT VFR BLD AUTO: 37 % (ref 36.6–50.3)
HGB BLD-MCNC: 12.2 G/DL (ref 12.1–17)
MAGNESIUM SERPL-MCNC: 1.7 MG/DL (ref 1.6–2.4)
MCH RBC QN AUTO: 31.9 PG (ref 26–34)
MCHC RBC AUTO-ENTMCNC: 33 G/DL (ref 30–36.5)
MCV RBC AUTO: 96.9 FL (ref 80–99)
NRBC # BLD: 0 K/UL (ref 0–0.01)
NRBC BLD-RTO: 0 PER 100 WBC
PHOSPHATE SERPL-MCNC: 2.7 MG/DL (ref 2.6–4.7)
PLATELET # BLD AUTO: 153 K/UL (ref 150–400)
PMV BLD AUTO: 11.9 FL (ref 8.9–12.9)
POTASSIUM SERPL-SCNC: 4.2 MMOL/L (ref 3.5–5.1)
RBC # BLD AUTO: 3.82 M/UL (ref 4.1–5.7)
SERVICE CMNT-IMP: ABNORMAL
SERVICE CMNT-IMP: NORMAL
SERVICE CMNT-IMP: NORMAL
SODIUM SERPL-SCNC: 137 MMOL/L (ref 136–145)
WBC # BLD AUTO: 5.8 K/UL (ref 4.1–11.1)

## 2023-10-23 PROCEDURE — 7100000001 HC PACU RECOVERY - ADDTL 15 MIN: Performed by: PODIATRIST

## 2023-10-23 PROCEDURE — 87186 SC STD MICRODIL/AGAR DIL: CPT

## 2023-10-23 PROCEDURE — 11047 DBRDMT BONE EACH ADDL: CPT | Performed by: PODIATRIST

## 2023-10-23 PROCEDURE — 3700000001 HC ADD 15 MINUTES (ANESTHESIA): Performed by: PODIATRIST

## 2023-10-23 PROCEDURE — 87077 CULTURE AEROBIC IDENTIFY: CPT

## 2023-10-23 PROCEDURE — 88304 TISSUE EXAM BY PATHOLOGIST: CPT

## 2023-10-23 PROCEDURE — 36415 COLL VENOUS BLD VENIPUNCTURE: CPT

## 2023-10-23 PROCEDURE — 6360000002 HC RX W HCPCS: Performed by: STUDENT IN AN ORGANIZED HEALTH CARE EDUCATION/TRAINING PROGRAM

## 2023-10-23 PROCEDURE — 1100000000 HC RM PRIVATE

## 2023-10-23 PROCEDURE — 2500000003 HC RX 250 WO HCPCS: Performed by: PODIATRIST

## 2023-10-23 PROCEDURE — 87205 SMEAR GRAM STAIN: CPT

## 2023-10-23 PROCEDURE — 88311 DECALCIFY TISSUE: CPT

## 2023-10-23 PROCEDURE — 80048 BASIC METABOLIC PNL TOTAL CA: CPT

## 2023-10-23 PROCEDURE — 3600000012 HC SURGERY LEVEL 2 ADDTL 15MIN: Performed by: PODIATRIST

## 2023-10-23 PROCEDURE — 3600000002 HC SURGERY LEVEL 2 BASE: Performed by: PODIATRIST

## 2023-10-23 PROCEDURE — 0QBN0ZZ EXCISION OF RIGHT METATARSAL, OPEN APPROACH: ICD-10-PCS | Performed by: PODIATRIST

## 2023-10-23 PROCEDURE — 6370000000 HC RX 637 (ALT 250 FOR IP): Performed by: INTERNAL MEDICINE

## 2023-10-23 PROCEDURE — 87070 CULTURE OTHR SPECIMN AEROBIC: CPT

## 2023-10-23 PROCEDURE — 6370000000 HC RX 637 (ALT 250 FOR IP): Performed by: STUDENT IN AN ORGANIZED HEALTH CARE EDUCATION/TRAINING PROGRAM

## 2023-10-23 PROCEDURE — A4216 STERILE WATER/SALINE, 10 ML: HCPCS | Performed by: STUDENT IN AN ORGANIZED HEALTH CARE EDUCATION/TRAINING PROGRAM

## 2023-10-23 PROCEDURE — 84100 ASSAY OF PHOSPHORUS: CPT

## 2023-10-23 PROCEDURE — 2580000003 HC RX 258: Performed by: ANESTHESIOLOGY

## 2023-10-23 PROCEDURE — 99232 SBSQ HOSP IP/OBS MODERATE 35: CPT | Performed by: PODIATRIST

## 2023-10-23 PROCEDURE — 7100000000 HC PACU RECOVERY - FIRST 15 MIN: Performed by: PODIATRIST

## 2023-10-23 PROCEDURE — 85027 COMPLETE CBC AUTOMATED: CPT

## 2023-10-23 PROCEDURE — 11044 DBRDMT BONE 1ST 20 SQ CM/<: CPT | Performed by: PODIATRIST

## 2023-10-23 PROCEDURE — 82962 GLUCOSE BLOOD TEST: CPT

## 2023-10-23 PROCEDURE — 87075 CULTR BACTERIA EXCEPT BLOOD: CPT

## 2023-10-23 PROCEDURE — 2709999900 HC NON-CHARGEABLE SUPPLY: Performed by: PODIATRIST

## 2023-10-23 PROCEDURE — 2580000003 HC RX 258: Performed by: STUDENT IN AN ORGANIZED HEALTH CARE EDUCATION/TRAINING PROGRAM

## 2023-10-23 PROCEDURE — 83735 ASSAY OF MAGNESIUM: CPT

## 2023-10-23 PROCEDURE — 3700000000 HC ANESTHESIA ATTENDED CARE: Performed by: PODIATRIST

## 2023-10-23 PROCEDURE — 6360000002 HC RX W HCPCS: Performed by: NURSE ANESTHETIST, CERTIFIED REGISTERED

## 2023-10-23 PROCEDURE — 20680 REMOVAL OF IMPLANT DEEP: CPT | Performed by: PODIATRIST

## 2023-10-23 PROCEDURE — 86140 C-REACTIVE PROTEIN: CPT

## 2023-10-23 RX ORDER — LIDOCAINE HYDROCHLORIDE 10 MG/ML
INJECTION, SOLUTION INFILTRATION; PERINEURAL PRN
Status: DISCONTINUED | OUTPATIENT
Start: 2023-10-23 | End: 2023-10-23 | Stop reason: ALTCHOICE

## 2023-10-23 RX ORDER — MIDAZOLAM HYDROCHLORIDE 1 MG/ML
INJECTION INTRAMUSCULAR; INTRAVENOUS PRN
Status: DISCONTINUED | OUTPATIENT
Start: 2023-10-23 | End: 2023-10-23 | Stop reason: SDUPTHER

## 2023-10-23 RX ORDER — ONDANSETRON 2 MG/ML
4 INJECTION INTRAMUSCULAR; INTRAVENOUS
Status: CANCELLED | OUTPATIENT
Start: 2023-10-23 | End: 2023-10-24

## 2023-10-23 RX ORDER — MIDAZOLAM HYDROCHLORIDE 2 MG/2ML
2 INJECTION, SOLUTION INTRAMUSCULAR; INTRAVENOUS
Status: DISCONTINUED | OUTPATIENT
Start: 2023-10-23 | End: 2023-10-23 | Stop reason: HOSPADM

## 2023-10-23 RX ORDER — LIDOCAINE HYDROCHLORIDE 10 MG/ML
1 INJECTION, SOLUTION EPIDURAL; INFILTRATION; INTRACAUDAL; PERINEURAL
Status: DISCONTINUED | OUTPATIENT
Start: 2023-10-23 | End: 2023-10-23 | Stop reason: HOSPADM

## 2023-10-23 RX ORDER — PHENYLEPHRINE HCL IN 0.9% NACL 0.4MG/10ML
SYRINGE (ML) INTRAVENOUS PRN
Status: DISCONTINUED | OUTPATIENT
Start: 2023-10-23 | End: 2023-10-23 | Stop reason: SDUPTHER

## 2023-10-23 RX ORDER — SODIUM CHLORIDE, SODIUM LACTATE, POTASSIUM CHLORIDE, CALCIUM CHLORIDE 600; 310; 30; 20 MG/100ML; MG/100ML; MG/100ML; MG/100ML
INJECTION, SOLUTION INTRAVENOUS CONTINUOUS
Status: DISCONTINUED | OUTPATIENT
Start: 2023-10-23 | End: 2023-10-23 | Stop reason: HOSPADM

## 2023-10-23 RX ORDER — PROPOFOL 10 MG/ML
INJECTION, EMULSION INTRAVENOUS PRN
Status: DISCONTINUED | OUTPATIENT
Start: 2023-10-23 | End: 2023-10-23 | Stop reason: SDUPTHER

## 2023-10-23 RX ORDER — SODIUM CHLORIDE, SODIUM LACTATE, POTASSIUM CHLORIDE, CALCIUM CHLORIDE 600; 310; 30; 20 MG/100ML; MG/100ML; MG/100ML; MG/100ML
INJECTION, SOLUTION INTRAVENOUS CONTINUOUS
Status: CANCELLED | OUTPATIENT
Start: 2023-10-23

## 2023-10-23 RX ORDER — FENTANYL CITRATE 50 UG/ML
INJECTION, SOLUTION INTRAMUSCULAR; INTRAVENOUS PRN
Status: DISCONTINUED | OUTPATIENT
Start: 2023-10-23 | End: 2023-10-23 | Stop reason: SDUPTHER

## 2023-10-23 RX ORDER — FENTANYL CITRATE 50 UG/ML
100 INJECTION, SOLUTION INTRAMUSCULAR; INTRAVENOUS
Status: DISCONTINUED | OUTPATIENT
Start: 2023-10-23 | End: 2023-10-23 | Stop reason: HOSPADM

## 2023-10-23 RX ORDER — DIPHENHYDRAMINE HYDROCHLORIDE 50 MG/ML
12.5 INJECTION INTRAMUSCULAR; INTRAVENOUS
Status: CANCELLED | OUTPATIENT
Start: 2023-10-23 | End: 2023-10-24

## 2023-10-23 RX ORDER — INSULIN LISPRO 100 [IU]/ML
6 INJECTION, SOLUTION INTRAVENOUS; SUBCUTANEOUS
Status: DISCONTINUED | OUTPATIENT
Start: 2023-10-23 | End: 2023-10-25

## 2023-10-23 RX ADMIN — SODIUM CHLORIDE, PRESERVATIVE FREE 10 ML: 5 INJECTION INTRAVENOUS at 09:14

## 2023-10-23 RX ADMIN — ACETAMINOPHEN 650 MG: 325 TABLET ORAL at 23:49

## 2023-10-23 RX ADMIN — DIAZEPAM 5 MG: 5 TABLET ORAL at 00:21

## 2023-10-23 RX ADMIN — CEFEPIME 2000 MG: 2 INJECTION, POWDER, FOR SOLUTION INTRAVENOUS at 20:23

## 2023-10-23 RX ADMIN — DAPTOMYCIN 800 MG: 500 INJECTION, POWDER, LYOPHILIZED, FOR SOLUTION INTRAVENOUS at 17:30

## 2023-10-23 RX ADMIN — PROPOFOL 75 MCG/KG/MIN: 10 INJECTION, EMULSION INTRAVENOUS at 13:28

## 2023-10-23 RX ADMIN — SODIUM CHLORIDE, POTASSIUM CHLORIDE, SODIUM LACTATE AND CALCIUM CHLORIDE: 600; 310; 30; 20 INJECTION, SOLUTION INTRAVENOUS at 13:20

## 2023-10-23 RX ADMIN — FENTANYL CITRATE 50 MCG: 50 INJECTION, SOLUTION INTRAMUSCULAR; INTRAVENOUS at 13:23

## 2023-10-23 RX ADMIN — PROPOFOL 30 MG: 10 INJECTION, EMULSION INTRAVENOUS at 13:27

## 2023-10-23 RX ADMIN — CEFEPIME 2000 MG: 2 INJECTION, POWDER, FOR SOLUTION INTRAVENOUS at 04:23

## 2023-10-23 RX ADMIN — INSULIN GLARGINE 25 UNITS: 100 INJECTION, SOLUTION SUBCUTANEOUS at 20:24

## 2023-10-23 RX ADMIN — MIDAZOLAM HYDROCHLORIDE 2 MG: 1 INJECTION, SOLUTION INTRAMUSCULAR; INTRAVENOUS at 13:20

## 2023-10-23 RX ADMIN — METRONIDAZOLE 500 MG: 500 INJECTION, SOLUTION INTRAVENOUS at 16:51

## 2023-10-23 RX ADMIN — METRONIDAZOLE 500 MG: 500 INJECTION, SOLUTION INTRAVENOUS at 00:16

## 2023-10-23 RX ADMIN — ACETAMINOPHEN 650 MG: 325 TABLET ORAL at 06:37

## 2023-10-23 RX ADMIN — CEFEPIME 2000 MG: 2 INJECTION, POWDER, FOR SOLUTION INTRAVENOUS at 13:35

## 2023-10-23 RX ADMIN — Medication 80 MCG: at 13:58

## 2023-10-23 RX ADMIN — METRONIDAZOLE 500 MG: 500 INJECTION, SOLUTION INTRAVENOUS at 09:13

## 2023-10-23 RX ADMIN — SODIUM CHLORIDE, PRESERVATIVE FREE 10 ML: 5 INJECTION INTRAVENOUS at 20:23

## 2023-10-23 RX ADMIN — LOSARTAN POTASSIUM 25 MG: 25 TABLET, FILM COATED ORAL at 09:13

## 2023-10-23 RX ADMIN — OXYCODONE HYDROCHLORIDE 5 MG: 5 TABLET ORAL at 23:49

## 2023-10-23 RX ADMIN — METRONIDAZOLE 500 MG: 500 INJECTION, SOLUTION INTRAVENOUS at 23:45

## 2023-10-23 ASSESSMENT — PAIN DESCRIPTION - DESCRIPTORS: DESCRIPTORS: SHARP

## 2023-10-23 ASSESSMENT — PAIN SCALES - GENERAL
PAINLEVEL_OUTOF10: 0
PAINLEVEL_OUTOF10: 3
PAINLEVEL_OUTOF10: 0
PAINLEVEL_OUTOF10: 0
PAINLEVEL_OUTOF10: 5

## 2023-10-23 ASSESSMENT — PAIN - FUNCTIONAL ASSESSMENT: PAIN_FUNCTIONAL_ASSESSMENT: 0-10

## 2023-10-23 ASSESSMENT — PAIN DESCRIPTION - ORIENTATION
ORIENTATION: MID
ORIENTATION: RIGHT

## 2023-10-23 ASSESSMENT — PAIN DESCRIPTION - DIRECTION: RADIATING_TOWARDS: NECK

## 2023-10-23 ASSESSMENT — PAIN DESCRIPTION - PAIN TYPE: TYPE: SURGICAL PAIN

## 2023-10-23 ASSESSMENT — PAIN DESCRIPTION - LOCATION
LOCATION: FOOT
LOCATION: HEAD

## 2023-10-23 NOTE — PERIOP NOTE
TRANSFER - OUT REPORT:    Verbal report given to BRIAN Hurst on Heather Zuniga  being transferred to room 522 for routine post-op       Report consisted of patient's Situation, Background, Assessment and   Recommendations(SBAR). Information from the following report(s) Nurse Handoff Report, Surgery Report, Intake/Output, and MAR was reviewed with the receiving nurse. Lines:   Peripheral IV 10/23/23 Posterior; Left Wrist (Active)   Site Assessment Clean, dry & intact 10/23/23 1415   Line Status Infusing 10/23/23 1415   Line Care Connections checked and tightened 10/23/23 1415   Phlebitis Assessment No symptoms 10/23/23 1415   Infiltration Assessment 0 10/23/23 1415   Alcohol Cap Used Yes 10/23/23 1415   Dressing Status Clean, dry & intact 10/23/23 1415   Dressing Type Transparent 10/23/23 1415        Opportunity for questions and clarification was provided. Patient transported with:  Registered Nurse    15:10 - Pt is now leaving PACU in stable condition.

## 2023-10-23 NOTE — DIABETES MGMT
IP consult for diabetes health received. Attempted to see patient at this time but he is off the floor in a procedure.
control  Discussed diabetes survival skills related to  Healthy Plate eating plan; given handouts  Role of physical activity in improving insulin sensitivity and action  Procedure for blood glucose monitoring & options for low-cost products  Medications plan at discharge     Billing Code(s)   45365    Before making these care recommendations, I personally reviewed the hospitalization record, including notes, laboratory & diagnostic data and current medications, and examined the patient at the bedside (circumstances permitting) before determining care. More than fifty (50) percent of the time was spent in patient counseling and/or care coordination.   Total minutes: 36    DECLAN Brice - CNS  Diabetes Clinical Nurse Specialist  Program for Diabetes Health  Access via [facility]

## 2023-10-23 NOTE — ANESTHESIA POSTPROCEDURE EVALUATION
Department of Anesthesiology  Postprocedure Note    Patient: Reina Valerio  MRN: [id number]  YOB: 1950  Date of evaluation: 10/23/2023      Procedure Summary     Date: 10/23/23 Room / Location: [facility] MAIN OR F2 / [facility] MAIN OR    Anesthesia Start: 1320 Anesthesia Stop: 7810    Procedure: FOOT DEBRIDEMENT NON VIABLE TISSUE AND BONE RIGHT (Right: Foot) Diagnosis:       Osteomyelitis of right foot, unspecified type ([address])      (Osteomyelitis of right foot, unspecified type ([address]) [M86.9])    Surgeons: Dante Dupont DPM Responsible Provider:     Anesthesia Type: MAC ASA Status: 3          Anesthesia Type: MAC    Nathanael Phase I: Nathanael Score: 4    Nathanael Phase II:        Anesthesia Post Evaluation    Patient location during evaluation: PACU  Patient participation: complete - patient participated  Level of consciousness: awake  Pain score: 0  Airway patency: patent  Nausea & Vomiting: no nausea and no vomiting  Complications: no  Cardiovascular status: blood pressure returned to baseline  Respiratory status: acceptable  Hydration status: euvolemic  Multimodal analgesia pain management approach  Pain management: adequate

## 2023-10-24 LAB
ANION GAP SERPL CALC-SCNC: 5 MMOL/L (ref 5–15)
BUN SERPL-MCNC: 15 MG/DL (ref 6–20)
BUN/CREAT SERPL: 14 (ref 12–20)
CALCIUM SERPL-MCNC: 8 MG/DL (ref 8.5–10.1)
CHLORIDE SERPL-SCNC: 106 MMOL/L (ref 97–108)
CO2 SERPL-SCNC: 25 MMOL/L (ref 21–32)
CREAT SERPL-MCNC: 1.1 MG/DL (ref 0.7–1.3)
CRP SERPL-MCNC: 0.95 MG/DL (ref 0–0.6)
ERYTHROCYTE [DISTWIDTH] IN BLOOD BY AUTOMATED COUNT: 13.3 % (ref 11.5–14.5)
GLUCOSE BLD STRIP.AUTO-MCNC: 172 MG/DL (ref 65–117)
GLUCOSE BLD STRIP.AUTO-MCNC: 193 MG/DL (ref 65–117)
GLUCOSE BLD STRIP.AUTO-MCNC: 207 MG/DL (ref 65–117)
GLUCOSE BLD STRIP.AUTO-MCNC: 226 MG/DL (ref 65–117)
GLUCOSE SERPL-MCNC: 255 MG/DL (ref 65–100)
HCT VFR BLD AUTO: 36.1 % (ref 36.6–50.3)
HGB BLD-MCNC: 12 G/DL (ref 12.1–17)
MAGNESIUM SERPL-MCNC: 1.5 MG/DL (ref 1.6–2.4)
MCH RBC QN AUTO: 32.9 PG (ref 26–34)
MCHC RBC AUTO-ENTMCNC: 33.2 G/DL (ref 30–36.5)
MCV RBC AUTO: 98.9 FL (ref 80–99)
NRBC # BLD: 0 K/UL (ref 0–0.01)
NRBC BLD-RTO: 0 PER 100 WBC
PHOSPHATE SERPL-MCNC: 2.2 MG/DL (ref 2.6–4.7)
PLATELET # BLD AUTO: 144 K/UL (ref 150–400)
PMV BLD AUTO: 11.8 FL (ref 8.9–12.9)
POTASSIUM SERPL-SCNC: 4 MMOL/L (ref 3.5–5.1)
RBC # BLD AUTO: 3.65 M/UL (ref 4.1–5.7)
SERVICE CMNT-IMP: ABNORMAL
SODIUM SERPL-SCNC: 136 MMOL/L (ref 136–145)
WBC # BLD AUTO: 8.8 K/UL (ref 4.1–11.1)

## 2023-10-24 PROCEDURE — 85027 COMPLETE CBC AUTOMATED: CPT

## 2023-10-24 PROCEDURE — 6370000000 HC RX 637 (ALT 250 FOR IP): Performed by: CLINICAL NURSE SPECIALIST

## 2023-10-24 PROCEDURE — 6370000000 HC RX 637 (ALT 250 FOR IP): Performed by: INTERNAL MEDICINE

## 2023-10-24 PROCEDURE — 2580000003 HC RX 258: Performed by: STUDENT IN AN ORGANIZED HEALTH CARE EDUCATION/TRAINING PROGRAM

## 2023-10-24 PROCEDURE — 86140 C-REACTIVE PROTEIN: CPT

## 2023-10-24 PROCEDURE — 36415 COLL VENOUS BLD VENIPUNCTURE: CPT

## 2023-10-24 PROCEDURE — 6370000000 HC RX 637 (ALT 250 FOR IP): Performed by: STUDENT IN AN ORGANIZED HEALTH CARE EDUCATION/TRAINING PROGRAM

## 2023-10-24 PROCEDURE — 1100000000 HC RM PRIVATE

## 2023-10-24 PROCEDURE — 82962 GLUCOSE BLOOD TEST: CPT

## 2023-10-24 PROCEDURE — 83735 ASSAY OF MAGNESIUM: CPT

## 2023-10-24 PROCEDURE — 6360000002 HC RX W HCPCS: Performed by: INTERNAL MEDICINE

## 2023-10-24 PROCEDURE — 6360000002 HC RX W HCPCS: Performed by: STUDENT IN AN ORGANIZED HEALTH CARE EDUCATION/TRAINING PROGRAM

## 2023-10-24 PROCEDURE — 99232 SBSQ HOSP IP/OBS MODERATE 35: CPT | Performed by: PODIATRIST

## 2023-10-24 PROCEDURE — 84100 ASSAY OF PHOSPHORUS: CPT

## 2023-10-24 PROCEDURE — 99221 1ST HOSP IP/OBS SF/LOW 40: CPT | Performed by: INTERNAL MEDICINE

## 2023-10-24 PROCEDURE — 80048 BASIC METABOLIC PNL TOTAL CA: CPT

## 2023-10-24 PROCEDURE — 94761 N-INVAS EAR/PLS OXIMETRY MLT: CPT

## 2023-10-24 PROCEDURE — A4216 STERILE WATER/SALINE, 10 ML: HCPCS | Performed by: STUDENT IN AN ORGANIZED HEALTH CARE EDUCATION/TRAINING PROGRAM

## 2023-10-24 RX ORDER — ALUMINUM ZIRCONIUM OCTACHLOROHYDREX GLY 16 G/100G
1 GEL TOPICAL DAILY
Status: DISCONTINUED | OUTPATIENT
Start: 2023-10-24 | End: 2023-10-26 | Stop reason: HOSPADM

## 2023-10-24 RX ORDER — SENNA AND DOCUSATE SODIUM 50; 8.6 MG/1; MG/1
1 TABLET, FILM COATED ORAL 2 TIMES DAILY
Status: DISCONTINUED | OUTPATIENT
Start: 2023-10-24 | End: 2023-10-26 | Stop reason: HOSPADM

## 2023-10-24 RX ORDER — BISACODYL 10 MG
10 SUPPOSITORY, RECTAL RECTAL DAILY PRN
Status: DISCONTINUED | OUTPATIENT
Start: 2023-10-24 | End: 2023-10-26 | Stop reason: HOSPADM

## 2023-10-24 RX ORDER — MAGNESIUM SULFATE IN WATER 40 MG/ML
2000 INJECTION, SOLUTION INTRAVENOUS ONCE
Status: COMPLETED | OUTPATIENT
Start: 2023-10-24 | End: 2023-10-24

## 2023-10-24 RX ADMIN — Medication 500 MG: at 20:29

## 2023-10-24 RX ADMIN — DOCUSATE SODIUM AND SENNOSIDES 1 TABLET: 8.6; 5 TABLET, FILM COATED ORAL at 20:29

## 2023-10-24 RX ADMIN — CEFEPIME 2000 MG: 2 INJECTION, POWDER, FOR SOLUTION INTRAVENOUS at 04:32

## 2023-10-24 RX ADMIN — INSULIN LISPRO 6 UNITS: 100 INJECTION, SOLUTION INTRAVENOUS; SUBCUTANEOUS at 17:19

## 2023-10-24 RX ADMIN — DAPTOMYCIN 800 MG: 500 INJECTION, POWDER, LYOPHILIZED, FOR SOLUTION INTRAVENOUS at 17:31

## 2023-10-24 RX ADMIN — INSULIN GLARGINE 25 UNITS: 100 INJECTION, SOLUTION SUBCUTANEOUS at 20:30

## 2023-10-24 RX ADMIN — INSULIN LISPRO 6 UNITS: 100 INJECTION, SOLUTION INTRAVENOUS; SUBCUTANEOUS at 11:52

## 2023-10-24 RX ADMIN — MAGNESIUM SULFATE HEPTAHYDRATE 2000 MG: 40 INJECTION, SOLUTION INTRAVENOUS at 09:03

## 2023-10-24 RX ADMIN — ACETAMINOPHEN 650 MG: 325 TABLET ORAL at 13:37

## 2023-10-24 RX ADMIN — Medication 1 CAPSULE: at 09:05

## 2023-10-24 RX ADMIN — Medication 500 MG: at 09:05

## 2023-10-24 RX ADMIN — SODIUM CHLORIDE, PRESERVATIVE FREE 10 ML: 5 INJECTION INTRAVENOUS at 20:30

## 2023-10-24 RX ADMIN — SODIUM CHLORIDE, PRESERVATIVE FREE 10 ML: 5 INJECTION INTRAVENOUS at 09:06

## 2023-10-24 RX ADMIN — CEFEPIME 2000 MG: 2 INJECTION, POWDER, FOR SOLUTION INTRAVENOUS at 13:35

## 2023-10-24 RX ADMIN — INSULIN LISPRO 6 UNITS: 100 INJECTION, SOLUTION INTRAVENOUS; SUBCUTANEOUS at 07:19

## 2023-10-24 RX ADMIN — ASPIRIN 81 MG: 81 TABLET, CHEWABLE ORAL at 09:05

## 2023-10-24 RX ADMIN — CEFEPIME 2000 MG: 2 INJECTION, POWDER, FOR SOLUTION INTRAVENOUS at 20:28

## 2023-10-24 RX ADMIN — LOSARTAN POTASSIUM 25 MG: 25 TABLET, FILM COATED ORAL at 09:05

## 2023-10-24 RX ADMIN — INSULIN LISPRO 2 UNITS: 100 INJECTION, SOLUTION INTRAVENOUS; SUBCUTANEOUS at 17:19

## 2023-10-24 ASSESSMENT — PAIN SCALES - GENERAL
PAINLEVEL_OUTOF10: 3
PAINLEVEL_OUTOF10: 0

## 2023-10-24 ASSESSMENT — PAIN DESCRIPTION - LOCATION: LOCATION: HEAD

## 2023-10-24 ASSESSMENT — PAIN DESCRIPTION - DESCRIPTORS: DESCRIPTORS: ACHING

## 2023-10-25 LAB
BACTERIA SPEC CULT: NORMAL
GLUCOSE BLD STRIP.AUTO-MCNC: 137 MG/DL (ref 65–117)
GLUCOSE BLD STRIP.AUTO-MCNC: 179 MG/DL (ref 65–117)
GLUCOSE BLD STRIP.AUTO-MCNC: 288 MG/DL (ref 65–117)
GLUCOSE BLD STRIP.AUTO-MCNC: 300 MG/DL (ref 65–117)
GLUCOSE BLD STRIP.AUTO-MCNC: 352 MG/DL (ref 65–117)
SERVICE CMNT-IMP: ABNORMAL
SERVICE CMNT-IMP: NORMAL

## 2023-10-25 PROCEDURE — 6360000002 HC RX W HCPCS: Performed by: STUDENT IN AN ORGANIZED HEALTH CARE EDUCATION/TRAINING PROGRAM

## 2023-10-25 PROCEDURE — 6370000000 HC RX 637 (ALT 250 FOR IP): Performed by: STUDENT IN AN ORGANIZED HEALTH CARE EDUCATION/TRAINING PROGRAM

## 2023-10-25 PROCEDURE — 97162 PT EVAL MOD COMPLEX 30 MIN: CPT

## 2023-10-25 PROCEDURE — A4216 STERILE WATER/SALINE, 10 ML: HCPCS | Performed by: STUDENT IN AN ORGANIZED HEALTH CARE EDUCATION/TRAINING PROGRAM

## 2023-10-25 PROCEDURE — 2580000003 HC RX 258: Performed by: INTERNAL MEDICINE

## 2023-10-25 PROCEDURE — 82962 GLUCOSE BLOOD TEST: CPT

## 2023-10-25 PROCEDURE — 97535 SELF CARE MNGMENT TRAINING: CPT

## 2023-10-25 PROCEDURE — 99232 SBSQ HOSP IP/OBS MODERATE 35: CPT | Performed by: CLINICAL NURSE SPECIALIST

## 2023-10-25 PROCEDURE — 97165 OT EVAL LOW COMPLEX 30 MIN: CPT

## 2023-10-25 PROCEDURE — 97161 PT EVAL LOW COMPLEX 20 MIN: CPT

## 2023-10-25 PROCEDURE — 97530 THERAPEUTIC ACTIVITIES: CPT

## 2023-10-25 PROCEDURE — 6370000000 HC RX 637 (ALT 250 FOR IP): Performed by: INTERNAL MEDICINE

## 2023-10-25 PROCEDURE — 1100000000 HC RM PRIVATE

## 2023-10-25 PROCEDURE — 97116 GAIT TRAINING THERAPY: CPT

## 2023-10-25 PROCEDURE — 2580000003 HC RX 258: Performed by: STUDENT IN AN ORGANIZED HEALTH CARE EDUCATION/TRAINING PROGRAM

## 2023-10-25 RX ORDER — INSULIN LISPRO 100 [IU]/ML
8 INJECTION, SOLUTION INTRAVENOUS; SUBCUTANEOUS
Status: DISCONTINUED | OUTPATIENT
Start: 2023-10-25 | End: 2023-10-26 | Stop reason: HOSPADM

## 2023-10-25 RX ORDER — INSULIN LISPRO 100 [IU]/ML
8 INJECTION, SOLUTION INTRAVENOUS; SUBCUTANEOUS ONCE
Status: DISCONTINUED | OUTPATIENT
Start: 2023-10-25 | End: 2023-10-26 | Stop reason: HOSPADM

## 2023-10-25 RX ADMIN — SODIUM CHLORIDE, PRESERVATIVE FREE 10 ML: 5 INJECTION INTRAVENOUS at 08:28

## 2023-10-25 RX ADMIN — ACETAMINOPHEN 650 MG: 325 TABLET ORAL at 00:06

## 2023-10-25 RX ADMIN — SODIUM CHLORIDE: 9 INJECTION, SOLUTION INTRAVENOUS at 04:13

## 2023-10-25 RX ADMIN — CEFEPIME 2000 MG: 2 INJECTION, POWDER, FOR SOLUTION INTRAVENOUS at 21:00

## 2023-10-25 RX ADMIN — CEFEPIME 2000 MG: 2 INJECTION, POWDER, FOR SOLUTION INTRAVENOUS at 04:33

## 2023-10-25 RX ADMIN — ASPIRIN 81 MG: 81 TABLET, CHEWABLE ORAL at 09:57

## 2023-10-25 RX ADMIN — SODIUM CHLORIDE, PRESERVATIVE FREE 10 ML: 5 INJECTION INTRAVENOUS at 21:03

## 2023-10-25 RX ADMIN — Medication 1 CAPSULE: at 09:56

## 2023-10-25 RX ADMIN — CEFEPIME 2000 MG: 2 INJECTION, POWDER, FOR SOLUTION INTRAVENOUS at 13:17

## 2023-10-25 RX ADMIN — ACETAMINOPHEN 650 MG: 325 TABLET ORAL at 18:28

## 2023-10-25 RX ADMIN — DAPTOMYCIN 800 MG: 500 INJECTION, POWDER, LYOPHILIZED, FOR SOLUTION INTRAVENOUS at 18:27

## 2023-10-25 RX ADMIN — INSULIN LISPRO 4 UNITS: 100 INJECTION, SOLUTION INTRAVENOUS; SUBCUTANEOUS at 22:44

## 2023-10-25 RX ADMIN — ACETAMINOPHEN 650 MG: 325 TABLET ORAL at 23:43

## 2023-10-25 RX ADMIN — INSULIN GLARGINE 25 UNITS: 100 INJECTION, SOLUTION SUBCUTANEOUS at 21:03

## 2023-10-25 RX ADMIN — LOSARTAN POTASSIUM 25 MG: 25 TABLET, FILM COATED ORAL at 09:56

## 2023-10-25 ASSESSMENT — PAIN SCALES - GENERAL
PAINLEVEL_OUTOF10: 0
PAINLEVEL_OUTOF10: 3
PAINLEVEL_OUTOF10: 4
PAINLEVEL_OUTOF10: 3
PAINLEVEL_OUTOF10: 0

## 2023-10-25 ASSESSMENT — PAIN DESCRIPTION - LOCATION
LOCATION: HEAD
LOCATION: HEAD

## 2023-10-25 ASSESSMENT — PAIN DESCRIPTION - ORIENTATION: ORIENTATION: ANTERIOR

## 2023-10-25 ASSESSMENT — PAIN DESCRIPTION - DESCRIPTORS: DESCRIPTORS: ACHING

## 2023-10-25 ASSESSMENT — PAIN DESCRIPTION - PAIN TYPE: TYPE: ACUTE PAIN

## 2023-10-26 VITALS
RESPIRATION RATE: 19 BRPM | WEIGHT: 218.03 LBS | BODY MASS INDEX: 29.53 KG/M2 | DIASTOLIC BLOOD PRESSURE: 88 MMHG | SYSTOLIC BLOOD PRESSURE: 150 MMHG | TEMPERATURE: 100.2 F | HEIGHT: 72 IN | OXYGEN SATURATION: 100 % | HEART RATE: 98 BPM

## 2023-10-26 PROBLEM — M86.9 OSTEOMYELITIS OF SECOND TOE OF RIGHT FOOT (HCC): Status: ACTIVE | Noted: 2023-10-26

## 2023-10-26 LAB
ANION GAP SERPL CALC-SCNC: 6 MMOL/L (ref 5–15)
BACTERIA SPEC CULT: ABNORMAL
BACTERIA SPEC CULT: ABNORMAL
BACTERIA SPEC CULT: NORMAL
BACTERIA SPEC CULT: NORMAL
BASOPHILS # BLD: 0.1 K/UL (ref 0–0.1)
BASOPHILS NFR BLD: 1 % (ref 0–1)
BUN SERPL-MCNC: 18 MG/DL (ref 6–20)
BUN/CREAT SERPL: 19 (ref 12–20)
CALCIUM SERPL-MCNC: 8.3 MG/DL (ref 8.5–10.1)
CHLORIDE SERPL-SCNC: 106 MMOL/L (ref 97–108)
CO2 SERPL-SCNC: 24 MMOL/L (ref 21–32)
CREAT SERPL-MCNC: 0.93 MG/DL (ref 0.7–1.3)
CRP SERPL-MCNC: 1.83 MG/DL (ref 0–0.6)
DIFFERENTIAL METHOD BLD: ABNORMAL
EOSINOPHIL # BLD: 0.2 K/UL (ref 0–0.4)
EOSINOPHIL NFR BLD: 3 % (ref 0–7)
ERYTHROCYTE [DISTWIDTH] IN BLOOD BY AUTOMATED COUNT: 13.2 % (ref 11.5–14.5)
GLUCOSE BLD STRIP.AUTO-MCNC: 148 MG/DL (ref 65–117)
GLUCOSE BLD STRIP.AUTO-MCNC: 156 MG/DL (ref 65–117)
GLUCOSE SERPL-MCNC: 251 MG/DL (ref 65–100)
GRAM STN SPEC: ABNORMAL
GRAM STN SPEC: ABNORMAL
HCT VFR BLD AUTO: 35.1 % (ref 36.6–50.3)
HGB BLD-MCNC: 11.7 G/DL (ref 12.1–17)
IMM GRANULOCYTES # BLD AUTO: 0.1 K/UL (ref 0–0.04)
IMM GRANULOCYTES NFR BLD AUTO: 1 % (ref 0–0.5)
LYMPHOCYTES # BLD: 0.6 K/UL (ref 0.8–3.5)
LYMPHOCYTES NFR BLD: 10 % (ref 12–49)
MAGNESIUM SERPL-MCNC: 1.7 MG/DL (ref 1.6–2.4)
MCH RBC QN AUTO: 31.9 PG (ref 26–34)
MCHC RBC AUTO-ENTMCNC: 33.3 G/DL (ref 30–36.5)
MCV RBC AUTO: 95.6 FL (ref 80–99)
MONOCYTES # BLD: 0.8 K/UL (ref 0–1)
MONOCYTES NFR BLD: 12 % (ref 5–13)
NEUTS SEG # BLD: 4.5 K/UL (ref 1.8–8)
NEUTS SEG NFR BLD: 73 % (ref 32–75)
NRBC # BLD: 0 K/UL (ref 0–0.01)
NRBC BLD-RTO: 0 PER 100 WBC
PHOSPHATE SERPL-MCNC: 2.4 MG/DL (ref 2.6–4.7)
PLATELET # BLD AUTO: 144 K/UL (ref 150–400)
PMV BLD AUTO: 11.6 FL (ref 8.9–12.9)
POTASSIUM SERPL-SCNC: 4 MMOL/L (ref 3.5–5.1)
RBC # BLD AUTO: 3.67 M/UL (ref 4.1–5.7)
RBC MORPH BLD: ABNORMAL
SERVICE CMNT-IMP: ABNORMAL
SERVICE CMNT-IMP: NORMAL
SERVICE CMNT-IMP: NORMAL
SODIUM SERPL-SCNC: 136 MMOL/L (ref 136–145)
WBC # BLD AUTO: 6.3 K/UL (ref 4.1–11.1)

## 2023-10-26 PROCEDURE — 84100 ASSAY OF PHOSPHORUS: CPT

## 2023-10-26 PROCEDURE — 2580000003 HC RX 258: Performed by: STUDENT IN AN ORGANIZED HEALTH CARE EDUCATION/TRAINING PROGRAM

## 2023-10-26 PROCEDURE — 82962 GLUCOSE BLOOD TEST: CPT

## 2023-10-26 PROCEDURE — 94761 N-INVAS EAR/PLS OXIMETRY MLT: CPT

## 2023-10-26 PROCEDURE — 6370000000 HC RX 637 (ALT 250 FOR IP): Performed by: STUDENT IN AN ORGANIZED HEALTH CARE EDUCATION/TRAINING PROGRAM

## 2023-10-26 PROCEDURE — 85025 COMPLETE CBC W/AUTO DIFF WBC: CPT

## 2023-10-26 PROCEDURE — 6360000002 HC RX W HCPCS: Performed by: STUDENT IN AN ORGANIZED HEALTH CARE EDUCATION/TRAINING PROGRAM

## 2023-10-26 PROCEDURE — 36415 COLL VENOUS BLD VENIPUNCTURE: CPT

## 2023-10-26 PROCEDURE — 86140 C-REACTIVE PROTEIN: CPT

## 2023-10-26 PROCEDURE — 6370000000 HC RX 637 (ALT 250 FOR IP): Performed by: CLINICAL NURSE SPECIALIST

## 2023-10-26 PROCEDURE — 6370000000 HC RX 637 (ALT 250 FOR IP): Performed by: INTERNAL MEDICINE

## 2023-10-26 PROCEDURE — 80048 BASIC METABOLIC PNL TOTAL CA: CPT

## 2023-10-26 PROCEDURE — 83735 ASSAY OF MAGNESIUM: CPT

## 2023-10-26 PROCEDURE — 99222 1ST HOSP IP/OBS MODERATE 55: CPT | Performed by: INTERNAL MEDICINE

## 2023-10-26 PROCEDURE — 99232 SBSQ HOSP IP/OBS MODERATE 35: CPT | Performed by: INTERNAL MEDICINE

## 2023-10-26 RX ORDER — INSULIN GLARGINE 300 U/ML
20 INJECTION, SOLUTION SUBCUTANEOUS NIGHTLY
Qty: 3 ML | Refills: 0 | Status: SHIPPED | OUTPATIENT
Start: 2023-10-26 | End: 2023-12-10

## 2023-10-26 RX ORDER — ASPIRIN 81 MG/1
81 TABLET, CHEWABLE ORAL DAILY
Qty: 30 TABLET | Refills: 3 | Status: SHIPPED | OUTPATIENT
Start: 2023-10-27

## 2023-10-26 RX ORDER — AMOXICILLIN AND CLAVULANATE POTASSIUM 875; 125 MG/1; MG/1
1 TABLET, FILM COATED ORAL 2 TIMES DAILY
Qty: 20 TABLET | Refills: 0 | Status: SHIPPED | OUTPATIENT
Start: 2023-10-26 | End: 2023-11-05

## 2023-10-26 RX ADMIN — Medication 1 CAPSULE: at 08:17

## 2023-10-26 RX ADMIN — OXYCODONE HYDROCHLORIDE 5 MG: 5 TABLET ORAL at 05:30

## 2023-10-26 RX ADMIN — INSULIN LISPRO 8 UNITS: 100 INJECTION, SOLUTION INTRAVENOUS; SUBCUTANEOUS at 08:17

## 2023-10-26 RX ADMIN — DIAZEPAM 5 MG: 5 TABLET ORAL at 14:35

## 2023-10-26 RX ADMIN — ACETAMINOPHEN 650 MG: 325 TABLET ORAL at 05:30

## 2023-10-26 RX ADMIN — ACETAMINOPHEN 650 MG: 325 TABLET ORAL at 13:11

## 2023-10-26 RX ADMIN — ASPIRIN 81 MG: 81 TABLET, CHEWABLE ORAL at 08:16

## 2023-10-26 RX ADMIN — LOSARTAN POTASSIUM 25 MG: 25 TABLET, FILM COATED ORAL at 08:17

## 2023-10-26 RX ADMIN — CEFEPIME 2000 MG: 2 INJECTION, POWDER, FOR SOLUTION INTRAVENOUS at 05:07

## 2023-10-26 ASSESSMENT — PAIN SCALES - GENERAL
PAINLEVEL_OUTOF10: 8
PAINLEVEL_OUTOF10: 8
PAINLEVEL_OUTOF10: 4

## 2023-10-26 ASSESSMENT — PAIN DESCRIPTION - LOCATION: LOCATION: HEAD

## 2023-10-26 NOTE — DISCHARGE SUMMARY
Hospitalist Discharge Summary     Patient ID:  Queta Martinez  [id number]  74 y.o.  1950    Admit date: 10/20/2023    Discharge date and time: 10/26/2023    Admission Diagnoses: Osteomyelitis ([address]) [M86.9]  Osteomyelitis of second toe of right foot ([address]) [M86.9]    Discharge Diagnoses:    Principal Problem:    Osteomyelitis ([address])  Active Problems:    Type 2 diabetes mellitus with hyperglycemia, with long-term current use of insulin ([address])    Diabetic polyneuropathy associated with type 2 diabetes mellitus ([address])    Osteomyelitis of second toe of right foot ([address])  Resolved Problems:    * No resolved hospital problems. *         Hospital Course:      73 yo hx of HTN, DM, CAD s/p CABG, R diabetic foot ulcer, presented w/ septic arthritis and osteomyelitis of R foot     R foot osteomyelitis/septic arthritis: foot MRI showed osteomyelitis in 2nd and 4th phalanx. S/p debridement by Podiatry on 12/23. Cultures growing enterococcus, E Coli. ID on board: Culture taken at time of admission growing E. coli. Pathology negative for osteomyelitis. Discussed with podiatry who did not feel there was a need for IV antibiotics. Plan discharge on Augmentin 875 mg p.o. twice daily x10 days     HTN: BP stable. Cont low dose losartan     DM type 2 w/ vascular complications: Y7N 5.6%. Patient is be discharged home on Actos and metformin at PTA dose. Advised to take metformin consistently  Continue Toujeo 20 units HS   Additional orders  Will need a FUV with PCP within 1-2 weeks after hospital discharge for ongoing diabetes management   2. Resume kevan CGM, swipe before meals and bedtime. CAD s/p CABG: cont ASA. continue statin      Hx of Von Willebrand dz: monitor for bleeding. No need for blood products during prior surgeries. Allergic to DDAVP. Only use SCDs for DVT prophylaxis     Imaging  MRI FOOT RIGHT W WO CONTRAST    Result Date: 10/21/2023  1.  Second MTP septic arthritis with osteomyelitis of the

## 2023-10-26 NOTE — CARE COORDINATION
10/23/2023   CARE MANAGEMENT NOTE:  CM reviewed EMR and handoff received from previous  Mallory Estrada). Pt was admitted with right foot osteo/septic arthritis. Reportedly, pt lives alone. RUR 10%    Transition Plan of Care:  Podiatry following for medical management  Plan is for pt to return home   Outpatient follow up  Friend will transport pt home    CM will continue to follow pt until discharged.   Victoriano
10/24/2023   CARE MANAGEMENT NOTE:  CM reviewed EMR and handoff received from previous  Danya Gordon). Pt was admitted with right foot osteo/septic arthritis. Reportedly, pt lives alone. RUR 10%     Transition Plan of Care:  Podiatry following for medical management - s/p debridement of nonviable tissue and bone right foot on 10/23  ID following and await cxs  Plan is for pt to return home   Outpatient follow up  Friend will transport pt home     CM will continue to follow pt until discharged.   Victoriano
10/25/2023   CARE MANAGEMENT NOTE:  CM reviewed EMR and handoff received from previous  Juve Nolan. Pt was admitted with right foot osteo/septic arthritis. Reportedly, pt lives alone. RUR 9%     Transition Plan of Care:  Endocrinology, Podiatry following for medical management - s/p debridement of nonviable tissue and bone right foot on 10/23  ID following and await cxs  Plan is for pt to return home   Outpatient follow up  Friend will transport pt home     CM will continue to follow pt until discharged.   Victoriano
10/26/2023   CARE MANAGEMENT NOTE:  CM reviewed EMR and handoff received from previous  Jerilyn Flanagan). Pt was admitted with right foot osteo/septic arthritis. Reportedly, pt lives alone. RUR 10%     Transition Plan of Care:  Endocrinology, Podiatry following for medical management - s/p debridement of nonviable tissue and bone right foot on 10/23  ID following and await cxs  PT/OT evals complete; pt ambulated 10 feet x 2 on 10/25 and SNF is recommended however pt is refusing. CM will continue to follow pt's progress with rehab therapies. Outpatient follow up  Friend will transport pt home     CM will continue to follow pt until discharged.   Victoriano
friends and neighbors that can help him. States his friend Willie Daley is his healthcare decision maker, spiritual care at bedside to complete AMD form. Transition of Care Plan  Monitor patient status and response to treatment. Patient continues to require medical management. CM needs: TBD  Possible surgery 10/23. CM to monitor post op progress  Transport home via friend Cristianerobinson Thuy. CM to monitor progress and recommendations.      Uyen Lee RN, MSN/Care manager
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
## 2023-10-26 NOTE — PLAN OF CARE
Problem: Physical Therapy - Adult  Goal: By Discharge: Performs mobility at highest level of function for planned discharge setting. See evaluation for individualized goals. Description: FUNCTIONAL STATUS PRIOR TO ADMISSION: Patient was modified independent using a single point cane for functional mobility. HOME SUPPORT PRIOR TO ADMISSION: The patient lived alone with neighbors and friends to provide assistance. Physical Therapy Goals  Initiated 10/25/2023  1. Patient will move from supine to sit and sit to supine in bed with supervision/set-up within 7 day(s). 2.  Patient will perform sit to stand with contact guard assist within 7 day(s). 3.  Patient will transfer from bed to chair and chair to bed with CGA using the least restrictive device within 7 day(s). 4.  Patient will ambulate with minimal assistance for 50 feet with the least restrictive device within 7 day(s). 5.  Patient will ascend/descend 5 stairs with 2 handrail(s) with minimal assistance within 7 day(s). Outcome: Progressing   PHYSICAL THERAPY EVALUATION    Patient: Mayco Call (61 y.o. male)  Date: 10/25/2023  Primary Diagnosis: Osteomyelitis ([address]) [M86.9]  Osteomyelitis of second toe of right foot (HCC) [M86.9]  Procedure(s) (LRB):  FOOT DEBRIDEMENT NON VIABLE TISSUE AND BONE RIGHT (Right) 2 Days Post-Op   Precautions:  (heel wt bearing RLE in post op shoe)   Left Lower Extremity Weight Bearing: Partial Weight Bearing (heel)                ASSESSMENT :   DEFICITS/IMPAIRMENTS:   The patient is limited by decreased functional mobility, independence in ADLs, strength, sensation, body mechanics, activity tolerance, safety awareness, coordination, balance. Pt admitted due to OM of R  2nd toe needing debridement and post op day x2. Pt is to bed heel WB on RLE with post op shoe. He is received supine in bed with dsg to R foot without c/o pain.       Based on the impairments listed above pt is CGA to EOB with good sitting
Problem: Safety - Adult  Goal: Free from fall injury  10/26/2023 1518 by Ramírez Evans RN  Outcome: Adequate for Discharge  10/26/2023 0457 by Yina Miller LPN  Outcome: Progressing     Problem: Skin/Tissue Integrity  Goal: Absence of new skin breakdown  Description: 1. Monitor for areas of redness and/or skin breakdown  2. Assess vascular access sites hourly  3. Every 4-6 hours minimum:  Change oxygen saturation probe site  4. Every 4-6 hours:  If on nasal continuous positive airway pressure, respiratory therapy assess nares and determine need for appliance change or resting period.   10/26/2023 1518 by Ramírez Evans RN  Outcome: Adequate for Discharge  10/26/2023 0457 by Yina Miller LPN  Outcome: Progressing     Problem: Pain  Goal: Verbalizes/displays adequate comfort level or baseline comfort level  10/26/2023 1518 by Ramírez Evans RN  Outcome: Adequate for Discharge  10/26/2023 0457 by Yina Miller LPN  Outcome: Progressing     Problem: Chronic Conditions and Co-morbidities  Goal: Patient's chronic conditions and co-morbidity symptoms are monitored and maintained or improved  10/26/2023 1518 by Ramírez Evans RN  Outcome: Adequate for Discharge  Flowsheets (Taken 10/26/2023 9606)  Care Plan - Patient's Chronic Conditions and Co-Morbidity Symptoms are Monitored and Maintained or Improved: Monitor and assess patient's chronic conditions and comorbid symptoms for stability, deterioration, or improvement  10/26/2023 0457 by Yina Miller LPN  Outcome: Progressing     Problem: Nutrition Deficit:  Goal: Optimize nutritional status  Outcome: Adequate for Discharge
Problem: Safety - Adult  Goal: Free from fall injury  Outcome: Progressing     Problem: Skin/Tissue Integrity  Goal: Absence of new skin breakdown  Description: 1. Monitor for areas of redness and/or skin breakdown  2. Assess vascular access sites hourly  3. Every 4-6 hours minimum:  Change oxygen saturation probe site  4. Every 4-6 hours:  If on nasal continuous positive airway pressure, respiratory therapy assess nares and determine need for appliance change or resting period.   Outcome: Progressing
Problem: Safety - Adult  Goal: Free from fall injury  Outcome: Progressing     Problem: Skin/Tissue Integrity  Goal: Absence of new skin breakdown  Description: 1. Monitor for areas of redness and/or skin breakdown  2. Assess vascular access sites hourly  3. Every 4-6 hours minimum:  Change oxygen saturation probe site  4. Every 4-6 hours:  If on nasal continuous positive airway pressure, respiratory therapy assess nares and determine need for appliance change or resting period.   Outcome: Progressing     Problem: Pain  Goal: Verbalizes/displays adequate comfort level or baseline comfort level  Outcome: Progressing
Problem: Safety - Adult  Goal: Free from fall injury  Outcome: Progressing     Problem: Skin/Tissue Integrity  Goal: Absence of new skin breakdown  Description: 1. Monitor for areas of redness and/or skin breakdown  2. Assess vascular access sites hourly  3. Every 4-6 hours minimum:  Change oxygen saturation probe site  4. Every 4-6 hours:  If on nasal continuous positive airway pressure, respiratory therapy assess nares and determine need for appliance change or resting period.   Outcome: Progressing     Problem: Pain  Goal: Verbalizes/displays adequate comfort level or baseline comfort level  Outcome: Progressing
Problem: Safety - Adult  Goal: Free from fall injury  Outcome: Progressing     Problem: Skin/Tissue Integrity  Goal: Absence of new skin breakdown  Description: 1. Monitor for areas of redness and/or skin breakdown  2. Assess vascular access sites hourly  3. Every 4-6 hours minimum:  Change oxygen saturation probe site  4. Every 4-6 hours:  If on nasal continuous positive airway pressure, respiratory therapy assess nares and determine need for appliance change or resting period.   Outcome: Progressing     Problem: Pain  Goal: Verbalizes/displays adequate comfort level or baseline comfort level  Outcome: Progressing     Problem: Chronic Conditions and Co-morbidities  Goal: Patient's chronic conditions and co-morbidity symptoms are monitored and maintained or improved  Outcome: Progressing
Problem: Safety - Adult  Goal: Free from fall injury  Outcome: Progressing     Problem: Skin/Tissue Integrity  Goal: Absence of new skin breakdown  Description: 1. Monitor for areas of redness and/or skin breakdown  2. Assess vascular access sites hourly  3. Every 4-6 hours minimum:  Change oxygen saturation probe site  4. Every 4-6 hours:  If on nasal continuous positive airway pressure, respiratory therapy assess nares and determine need for appliance change or resting period. Outcome: Progressing     Problem: Pain  Goal: Verbalizes/displays adequate comfort level or baseline comfort level  Outcome: Progressing     Problem: Chronic Conditions and Co-morbidities  Goal: Patient's chronic conditions and co-morbidity symptoms are monitored and maintained or improved  Outcome: Progressing     Problem: Occupational Therapy - Adult  Goal: By Discharge: Performs self-care activities at highest level of function for planned discharge setting. See evaluation for individualized goals. Description: FUNCTIONAL STATUS PRIOR TO ADMISSION:  Patient mod I for ADLs and transfers. , ADL Assistance: Independent,  ,  ,  ,  ,  ,  ,  ,  , Active : Yes     HOME SUPPORT: Patient lived alone and has neighbors/friends that assist..    Occupational Therapy Goals:  Initiated 10/25/2023  1. Patient will perform lower body dressing with Supervision within 7 day(s). 2.  Patient will perform grooming with Supervision within 7 day(s). 3.  Patient will perform toilet transfers with Supervision  within 7 day(s). 4.  Patient will perform all aspects of toileting with Supervision within 7 day(s). 5.  Patient will participate in upper extremity therapeutic exercise/activities with Supervision for 10 minutes within 7 day(s). 6.  Patient will utilize energy conservation techniques during functional activities with verbal cues within 7 day(s).     10/25/2023 1514 by Carlos Zamudio OT  Outcome: Progressing
Problem: Safety - Adult  Goal: Free from fall injury  Outcome: Progressing  Flowsheets (Taken 10/23/2023 1201 by Magy Larsen, [address])  Free From Fall Injury:   Instruct family/caregiver on patient safety   Based on caregiver fall risk screen, instruct family/caregiver to ask for assistance with transferring infant if caregiver noted to have fall risk factors     Problem: Skin/Tissue Integrity  Goal: Absence of new skin breakdown  Description: 1. Monitor for areas of redness and/or skin breakdown  2. Assess vascular access sites hourly  3. Every 4-6 hours minimum:  Change oxygen saturation probe site  4. Every 4-6 hours:  If on nasal continuous positive airway pressure, respiratory therapy assess nares and determine need for appliance change or resting period.   Outcome: Progressing     Problem: Pain  Goal: Verbalizes/displays adequate comfort level or baseline comfort level  Outcome: Progressing     Problem: Chronic Conditions and Co-morbidities  Goal: Patient's chronic conditions and co-morbidity symptoms are monitored and maintained or improved  Outcome: Progressing
relief;Verbal cues  Sit to Stand: Minimum assistance; Additional time  Stand to Sit: Minimum assistance; Additional time            Balance:  Standing: Impaired  Balance  Sitting: Intact  Standing: Impaired  Standing - Static: Constant support; Fair  Standing - Dynamic: Constant support;Fair;Poor        ADL Assessment:          Feeding: Independent       Grooming: Setup  Grooming Skilled Clinical Factors: seated    UE Bathing: Setup       LE Bathing: Independent       UE Dressing: Supervision       LE Dressing: Minimal assistance       Toileting: Moderate assistance              ADL Intervention and task modifications:      Pain Rating:  Does not report pain/10   Pain Intervention(s):      Activity Tolerance:   Good and requires rest breaks    After treatment:   Patient left in no apparent distress sitting up in chair, Call bell within reach, and Bed/ chair alarm activated    COMMUNICATION/EDUCATION:   The patient's plan of care was discussed with: physical therapist and registered nurse         Thank you for this referral.  Pradip Real OTR/L  Minutes: 45    Occupational Therapy Evaluation Charge Determination   History Examination Decision-Making   LOW Complexity : Brief history review  LOW Complexity: 1-3 Performance deficits relating to physical, cognitive, or psychosocial skills that result in activity limitations and/or participation restrictions LOW Complexity: No comorbidities that affect functional and  no verbal  or physical assist needed to complete eval tasks   Based on the above components, the patient evaluation is determined to be of the following complexity level: Low

## 2023-10-26 NOTE — DISCHARGE INSTR - COC
Continuity of Care Form    Patient Name: Lisa Mccain   :  1950  MRN:  [id number]    [address] date:  10/20/2023  Discharge date:  10/26/2023    Code Status Order: Full Code   Advance Directives:     Admitting Physician:  Srinivas Nash MD  PCP: MARCIO Menjivar    Discharging Nurse: Claude Rouse [facility] Unit/Room#: 731/77  Discharging Unit Phone Number: [phone]    Emergency Contact:   Extended Emergency Contact Information  Primary Emergency Contact: Gideon Polo Address: [address] [place] [address]  Mobile Phone: [phone]  Relation: Friend    Past Surgical History:  Past Surgical History:   Procedure Laterality Date    [address]      CABG, ARTERY-VEIN, FOUR  2018    CABG    CATARACT REMOVAL Bilateral ,     FOOT DEBRIDEMENT Right 10/23/2023    FOOT DEBRIDEMENT NON VIABLE TISSUE AND BONE RIGHT performed by Crissy Powers DPM at [facility]  2017    COLONOSCOPY WITH NO POLYPS    HEENT      T&A    HEENT  1972    EXTRACTION OF WISDOM TEETH X 4 (2 SURGERIES 2 TEETH AT A TIME)    ORTHOPEDIC SURGERY  2006    RIGHT HAMMER TOES X 4 FIXED    OTHER SURGICAL HISTORY      RIGHT INNER THIGH CEBACIOUS CYST REMOVED    MN UNLISTED PROCEDURE ABDOMEN PERITONEUM & OMENTUM      RIGHT CONGENITAL INGUINAL HERNIA REPAIR    MN UNLISTED PROCEDURE ABDOMEN PERITONEUM & OMENTUM      LEFT INGUINAL HERNIA REPAIR    MN UNLISTED PROCEDURE ABDOMEN PERITONEUM & OMENTUM      SIGMOIDOSCOPY-FOR HEMMORIODS. PROSTATECTOMY  2012    RADICAL PROSTATECTOMY    TOTAL HIP ARTHROPLASTY Right 2017       Immunization History: There is no immunization history on file for this patient. 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 A    Chronic pain G89.29    Hyponatremia E87.1    Prostate cancer ([address]) Kayla Briones

## 2023-11-02 ENCOUNTER — TELEPHONE (OUTPATIENT)
Age: 73
End: 2023-11-02

## 2023-11-02 ENCOUNTER — OFFICE VISIT (OUTPATIENT)
Age: 73
End: 2023-11-02
Payer: MEDICARE

## 2023-11-02 VITALS
HEIGHT: 72 IN | HEART RATE: 90 BPM | DIASTOLIC BLOOD PRESSURE: 74 MMHG | WEIGHT: 218 LBS | SYSTOLIC BLOOD PRESSURE: 135 MMHG | TEMPERATURE: 98 F | OXYGEN SATURATION: 95 % | BODY MASS INDEX: 29.53 KG/M2

## 2023-11-02 DIAGNOSIS — L97.514 ULCER OF RIGHT FOOT WITH NECROSIS OF BONE (HCC): Primary | ICD-10-CM

## 2023-11-02 PROCEDURE — 99213 OFFICE O/P EST LOW 20 MIN: CPT | Performed by: PODIATRIST

## 2023-11-02 PROCEDURE — 1123F ACP DISCUSS/DSCN MKR DOCD: CPT | Performed by: PODIATRIST

## 2023-11-02 ASSESSMENT — ENCOUNTER SYMPTOMS
SHORTNESS OF BREATH: 0
VOMITING: 0
ABDOMINAL PAIN: 0
DIARRHEA: 0

## 2023-11-02 NOTE — TELEPHONE ENCOUNTER
Patient seen in office today. Verbal order given from Dr Cifuentes for wound care. Referral faxed to [facility].     Forwarding to Dr Cifuentes to sign off

## 2023-11-09 ENCOUNTER — OFFICE VISIT (OUTPATIENT)
Age: 73
End: 2023-11-09
Payer: MEDICARE

## 2023-11-09 VITALS
BODY MASS INDEX: 29.53 KG/M2 | RESPIRATION RATE: 18 BRPM | HEART RATE: 83 BPM | DIASTOLIC BLOOD PRESSURE: 87 MMHG | SYSTOLIC BLOOD PRESSURE: 136 MMHG | WEIGHT: 218 LBS | HEIGHT: 72 IN | TEMPERATURE: 98.6 F | OXYGEN SATURATION: 94 %

## 2023-11-09 DIAGNOSIS — L97.513 ULCER OF RIGHT FOOT WITH NECROSIS OF MUSCLE (HCC): Primary | ICD-10-CM

## 2023-11-09 PROCEDURE — 99213 OFFICE O/P EST LOW 20 MIN: CPT | Performed by: PODIATRIST

## 2023-11-09 PROCEDURE — 1123F ACP DISCUSS/DSCN MKR DOCD: CPT | Performed by: PODIATRIST

## 2023-11-09 RX ORDER — SIMVASTATIN 20 MG
20 TABLET ORAL
COMMUNITY
Start: 2023-10-19 | End: 2023-11-09 | Stop reason: SDUPTHER

## 2023-11-09 RX ORDER — BUTALBITAL, ACETAMINOPHEN AND CAFFEINE 50; 325; 40 MG/1; MG/1; MG/1
TABLET ORAL
COMMUNITY
Start: 2017-07-13

## 2023-11-09 ASSESSMENT — PATIENT HEALTH QUESTIONNAIRE - PHQ9
2. FEELING DOWN, DEPRESSED OR HOPELESS: 0
6. FEELING BAD ABOUT YOURSELF - OR THAT YOU ARE A FAILURE OR HAVE LET YOURSELF OR YOUR FAMILY DOWN: 0
SUM OF ALL RESPONSES TO PHQ QUESTIONS 1-9: 0
9. THOUGHTS THAT YOU WOULD BE BETTER OFF DEAD, OR OF HURTING YOURSELF: 0
1. LITTLE INTEREST OR PLEASURE IN DOING THINGS: 0
SUM OF ALL RESPONSES TO PHQ9 QUESTIONS 1 & 2: 0
3. TROUBLE FALLING OR STAYING ASLEEP: 0
5. POOR APPETITE OR OVEREATING: 0
4. FEELING TIRED OR HAVING LITTLE ENERGY: 0
7. TROUBLE CONCENTRATING ON THINGS, SUCH AS READING THE NEWSPAPER OR WATCHING TELEVISION: 0
SUM OF ALL RESPONSES TO PHQ QUESTIONS 1-9: 0
8. MOVING OR SPEAKING SO SLOWLY THAT OTHER PEOPLE COULD HAVE NOTICED. OR THE OPPOSITE, BEING SO FIGETY OR RESTLESS THAT YOU HAVE BEEN MOVING AROUND A LOT MORE THAN USUAL: 0
SUM OF ALL RESPONSES TO PHQ QUESTIONS 1-9: 0
SUM OF ALL RESPONSES TO PHQ QUESTIONS 1-9: 0

## 2023-11-15 ASSESSMENT — ENCOUNTER SYMPTOMS
VOMITING: 0
ABDOMINAL PAIN: 0
DIARRHEA: 0
SHORTNESS OF BREATH: 0

## 2023-11-15 NOTE — PROGRESS NOTES
[address] [facility] FOOT SURGERY         Patient Name: Meme Coley    : 1950    Visit Date: 2023    Office Visit Note      Subjective:         Patient is a 74 y.o. male who is being seen in office follow up visit for visit for ulcer right foot. Patient still pending home health care nurse. Patient has been keeping dressing clean dry and intact. Patient currently finish oral Augmentin 875 as prescribed by ID doctor. 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[address])      Past Surgical History:   Procedure Laterality Date    APPENDECTOMY  1979    ARTERIAL BYPASS SURGRY      CABG, ARTERY-VEIN, FOUR  2018    CABG    CATARACT REMOVAL Bilateral 2011    FOOT DEBRIDEMENT Right 10/23/2023    FOOT DEBRIDEMENT NON VIABLE TISSUE AND BONE RIGHT performed by Gianfranco Capellan DPM at [facility]  2017    COLONOSCOPY WITH NO POLYPS    HEENT      T&A    HEENT  1972    EXTRACTION OF WISDOM TEETH X 4 (2 SURGERIES 2 TEETH AT A TIME)    ORTHOPEDIC SURGERY  2006    RIGHT HAMMER TOES X 4 FIXED    OTHER SURGICAL HISTORY  1963    RIGHT INNER THIGH CEBACIOUS CYST REMOVED    MO UNLISTED PROCEDURE ABDOMEN PERITONEUM & OMENTUM      RIGHT CONGENITAL INGUINAL HERNIA REPAIR    MO UNLISTED PROCEDURE ABDOMEN PERITONEUM & OMENTUM      LEFT INGUINAL HERNIA REPAIR    MO UNLISTED PROCEDURE ABDOMEN PERITONEUM & OMENTUM      SIGMOIDOSCOPY-FOR HEMMORIODS.     PROSTATECTOMY  2012    RADICAL PROSTATECTOMY    TOTAL HIP ARTHROPLASTY Right 2017       Family History   Problem Relation Age of Onset    Cancer Father

## 2025-08-05 ENCOUNTER — HOSPITAL ENCOUNTER (OUTPATIENT)
Facility: HOSPITAL | Age: 75
Discharge: HOME | End: 2025-08-05
Attending: PODIATRIST
Payer: MEDICARE

## 2025-08-05 VITALS
RESPIRATION RATE: 18 BRPM | BODY MASS INDEX: 29.68 KG/M2 | WEIGHT: 212 LBS | HEIGHT: 71 IN | SYSTOLIC BLOOD PRESSURE: 134 MMHG | DIASTOLIC BLOOD PRESSURE: 82 MMHG | TEMPERATURE: 97.2 F | HEART RATE: 92 BPM

## 2025-08-05 DIAGNOSIS — L97.416 DIABETIC ULCER OF RIGHT MIDFOOT ASSOCIATED WITH TYPE 2 DIABETES MELLITUS, WITH BONE INVOLVEMENT WITHOUT EVIDENCE OF NECROSIS (HCC): Primary | ICD-10-CM

## 2025-08-05 DIAGNOSIS — E11.621 DIABETIC ULCER OF RIGHT MIDFOOT ASSOCIATED WITH TYPE 2 DIABETES MELLITUS, WITH BONE INVOLVEMENT WITHOUT EVIDENCE OF NECROSIS (HCC): Primary | ICD-10-CM

## 2025-08-05 PROCEDURE — 11043 DBRDMT MUSC&/FSCA 1ST 20/<: CPT

## 2025-08-05 PROCEDURE — 99214 OFFICE O/P EST MOD 30 MIN: CPT

## 2025-08-05 RX ORDER — GENTAMICIN SULFATE 1 MG/G
OINTMENT TOPICAL
Qty: 30 G | Refills: 5 | Status: SHIPPED | OUTPATIENT
Start: 2025-08-05

## 2025-08-05 RX ORDER — SERTRALINE HYDROCHLORIDE 20 MG/ML
25 SOLUTION ORAL DAILY
COMMUNITY

## 2025-08-05 RX ORDER — M-VIT,TX,IRON,MINS/CALC/FOLIC 27MG-0.4MG
1 TABLET ORAL DAILY
COMMUNITY

## 2025-08-12 ENCOUNTER — HOSPITAL ENCOUNTER (OUTPATIENT)
Facility: HOSPITAL | Age: 75
Discharge: HOME | End: 2025-08-12
Attending: PODIATRIST
Payer: MEDICARE

## 2025-08-12 VITALS
DIASTOLIC BLOOD PRESSURE: 84 MMHG | SYSTOLIC BLOOD PRESSURE: 156 MMHG | TEMPERATURE: 97.8 F | RESPIRATION RATE: 16 BRPM | HEART RATE: 101 BPM

## 2025-08-12 DIAGNOSIS — L97.514 ULCER OF TOE OF RIGHT FOOT, WITH NECROSIS OF BONE (HCC): Primary | ICD-10-CM

## 2025-08-12 PROCEDURE — 87205 SMEAR GRAM STAIN: CPT

## 2025-08-12 PROCEDURE — 11043 DBRDMT MUSC&/FSCA 1ST 20/<: CPT

## 2025-08-12 PROCEDURE — 87070 CULTURE OTHR SPECIMN AEROBIC: CPT

## 2025-08-12 PROCEDURE — 88304 TISSUE EXAM BY PATHOLOGIST: CPT

## 2025-08-12 RX ORDER — BETAMETHASONE DIPROPIONATE 0.5 MG/G
CREAM TOPICAL PRN
OUTPATIENT
Start: 2025-08-12

## 2025-08-12 RX ORDER — LIDOCAINE 40 MG/G
CREAM TOPICAL PRN
Status: CANCELLED | OUTPATIENT
Start: 2025-08-12

## 2025-08-12 RX ORDER — LIDOCAINE HYDROCHLORIDE 40 MG/ML
SOLUTION TOPICAL PRN
Status: CANCELLED | OUTPATIENT
Start: 2025-08-12

## 2025-08-12 RX ORDER — MUPIROCIN 2 %
OINTMENT (GRAM) TOPICAL PRN
OUTPATIENT
Start: 2025-08-12

## 2025-08-12 RX ORDER — TRIAMCINOLONE ACETONIDE 1 MG/G
OINTMENT TOPICAL PRN
Status: CANCELLED | OUTPATIENT
Start: 2025-08-12

## 2025-08-12 RX ORDER — BACITRACIN ZINC AND POLYMYXIN B SULFATE 500; 1000 [USP'U]/G; [USP'U]/G
OINTMENT TOPICAL PRN
OUTPATIENT
Start: 2025-08-12

## 2025-08-12 RX ORDER — GINSENG 100 MG
CAPSULE ORAL PRN
OUTPATIENT
Start: 2025-08-12

## 2025-08-12 RX ORDER — LIDOCAINE HYDROCHLORIDE 40 MG/ML
SOLUTION TOPICAL PRN
OUTPATIENT
Start: 2025-08-12

## 2025-08-12 RX ORDER — GENTAMICIN SULFATE 1 MG/G
OINTMENT TOPICAL PRN
OUTPATIENT
Start: 2025-08-12

## 2025-08-12 RX ORDER — GINSENG 100 MG
CAPSULE ORAL PRN
Status: CANCELLED | OUTPATIENT
Start: 2025-08-12

## 2025-08-12 RX ORDER — LIDOCAINE HYDROCHLORIDE 20 MG/ML
JELLY TOPICAL PRN
Status: CANCELLED | OUTPATIENT
Start: 2025-08-12

## 2025-08-12 RX ORDER — TRIAMCINOLONE ACETONIDE 1 MG/G
OINTMENT TOPICAL PRN
OUTPATIENT
Start: 2025-08-12

## 2025-08-12 RX ORDER — LIDOCAINE 50 MG/G
OINTMENT TOPICAL PRN
OUTPATIENT
Start: 2025-08-12

## 2025-08-12 RX ORDER — NEOMYCIN/BACITRACIN/POLYMYXINB 3.5-400-5K
OINTMENT (GRAM) TOPICAL PRN
Status: CANCELLED | OUTPATIENT
Start: 2025-08-12

## 2025-08-12 RX ORDER — BETAMETHASONE DIPROPIONATE 0.5 MG/G
CREAM TOPICAL PRN
Status: CANCELLED | OUTPATIENT
Start: 2025-08-12

## 2025-08-12 RX ORDER — SILVER SULFADIAZINE 10 MG/G
CREAM TOPICAL PRN
Status: CANCELLED | OUTPATIENT
Start: 2025-08-12

## 2025-08-12 RX ORDER — MUPIROCIN 2 %
OINTMENT (GRAM) TOPICAL PRN
Status: CANCELLED | OUTPATIENT
Start: 2025-08-12

## 2025-08-12 RX ORDER — SODIUM CHLOR/HYPOCHLOROUS ACID 0.033 %
SOLUTION, IRRIGATION IRRIGATION PRN
Status: CANCELLED | OUTPATIENT
Start: 2025-08-12

## 2025-08-12 RX ORDER — BACITRACIN ZINC AND POLYMYXIN B SULFATE 500; 1000 [USP'U]/G; [USP'U]/G
OINTMENT TOPICAL PRN
Status: CANCELLED | OUTPATIENT
Start: 2025-08-12

## 2025-08-12 RX ORDER — CLOBETASOL PROPIONATE 0.5 MG/G
OINTMENT TOPICAL PRN
Status: CANCELLED | OUTPATIENT
Start: 2025-08-12

## 2025-08-12 RX ORDER — NEOMYCIN/BACITRACIN/POLYMYXINB 3.5-400-5K
OINTMENT (GRAM) TOPICAL PRN
OUTPATIENT
Start: 2025-08-12

## 2025-08-12 RX ORDER — LIDOCAINE 40 MG/G
CREAM TOPICAL PRN
OUTPATIENT
Start: 2025-08-12

## 2025-08-12 RX ORDER — GENTAMICIN SULFATE 1 MG/G
OINTMENT TOPICAL PRN
Status: CANCELLED | OUTPATIENT
Start: 2025-08-12

## 2025-08-12 RX ORDER — SODIUM CHLOR/HYPOCHLOROUS ACID 0.033 %
SOLUTION, IRRIGATION IRRIGATION PRN
OUTPATIENT
Start: 2025-08-12

## 2025-08-12 RX ORDER — CLOBETASOL PROPIONATE 0.5 MG/G
OINTMENT TOPICAL PRN
OUTPATIENT
Start: 2025-08-12

## 2025-08-12 RX ORDER — LIDOCAINE 50 MG/G
OINTMENT TOPICAL PRN
Status: CANCELLED | OUTPATIENT
Start: 2025-08-12

## 2025-08-12 RX ORDER — LIDOCAINE HYDROCHLORIDE 20 MG/ML
JELLY TOPICAL PRN
OUTPATIENT
Start: 2025-08-12

## 2025-08-12 RX ORDER — SILVER SULFADIAZINE 10 MG/G
CREAM TOPICAL PRN
OUTPATIENT
Start: 2025-08-12

## 2025-08-12 ASSESSMENT — PAIN SCALES - GENERAL: PAINLEVEL_OUTOF10: 0

## 2025-08-17 LAB
BACTERIA SPEC CULT: ABNORMAL
GRAM STN SPEC: ABNORMAL
GRAM STN SPEC: ABNORMAL
SERVICE CMNT-IMP: ABNORMAL

## 2025-08-19 ENCOUNTER — HOSPITAL ENCOUNTER (OUTPATIENT)
Facility: HOSPITAL | Age: 75
Discharge: HOME | End: 2025-08-19
Attending: PODIATRIST
Payer: MEDICARE

## 2025-08-19 VITALS
HEART RATE: 99 BPM | DIASTOLIC BLOOD PRESSURE: 81 MMHG | TEMPERATURE: 98.1 F | RESPIRATION RATE: 14 BRPM | SYSTOLIC BLOOD PRESSURE: 130 MMHG

## 2025-08-19 DIAGNOSIS — L97.514 ULCER OF TOE OF RIGHT FOOT, WITH NECROSIS OF BONE (HCC): Primary | ICD-10-CM

## 2025-08-19 PROCEDURE — 11042 DBRDMT SUBQ TIS 1ST 20SQCM/<: CPT

## 2025-08-19 PROCEDURE — 11043 DBRDMT MUSC&/FSCA 1ST 20/<: CPT

## 2025-08-19 RX ORDER — LEVOFLOXACIN 500 MG/1
500 TABLET, FILM COATED ORAL DAILY
Qty: 10 TABLET | Refills: 0 | Status: SHIPPED | OUTPATIENT
Start: 2025-08-19 | End: 2025-08-29

## 2025-08-19 RX ORDER — SILVER SULFADIAZINE 10 MG/G
CREAM TOPICAL PRN
OUTPATIENT
Start: 2025-08-19

## 2025-08-19 RX ORDER — SODIUM CHLOR/HYPOCHLOROUS ACID 0.033 %
SOLUTION, IRRIGATION IRRIGATION PRN
OUTPATIENT
Start: 2025-08-19

## 2025-08-19 RX ORDER — LIDOCAINE 40 MG/G
CREAM TOPICAL PRN
OUTPATIENT
Start: 2025-08-19

## 2025-08-19 RX ORDER — LIDOCAINE 50 MG/G
OINTMENT TOPICAL PRN
OUTPATIENT
Start: 2025-08-19

## 2025-08-19 RX ORDER — LIDOCAINE HYDROCHLORIDE 40 MG/ML
SOLUTION TOPICAL PRN
OUTPATIENT
Start: 2025-08-19

## 2025-08-19 RX ORDER — BETAMETHASONE DIPROPIONATE 0.5 MG/G
CREAM TOPICAL PRN
OUTPATIENT
Start: 2025-08-19

## 2025-08-19 RX ORDER — NEOMYCIN/BACITRACIN/POLYMYXINB 3.5-400-5K
OINTMENT (GRAM) TOPICAL PRN
OUTPATIENT
Start: 2025-08-19

## 2025-08-19 RX ORDER — BACITRACIN ZINC AND POLYMYXIN B SULFATE 500; 1000 [USP'U]/G; [USP'U]/G
OINTMENT TOPICAL PRN
OUTPATIENT
Start: 2025-08-19

## 2025-08-19 RX ORDER — MUPIROCIN 2 %
OINTMENT (GRAM) TOPICAL PRN
OUTPATIENT
Start: 2025-08-19

## 2025-08-19 RX ORDER — LIDOCAINE HYDROCHLORIDE 20 MG/ML
JELLY TOPICAL PRN
OUTPATIENT
Start: 2025-08-19

## 2025-08-19 RX ORDER — TRIAMCINOLONE ACETONIDE 1 MG/G
OINTMENT TOPICAL PRN
OUTPATIENT
Start: 2025-08-19

## 2025-08-19 RX ORDER — GENTAMICIN SULFATE 1 MG/G
OINTMENT TOPICAL PRN
OUTPATIENT
Start: 2025-08-19

## 2025-08-19 RX ORDER — CLOBETASOL PROPIONATE 0.5 MG/G
OINTMENT TOPICAL PRN
OUTPATIENT
Start: 2025-08-19

## 2025-08-19 RX ORDER — GINSENG 100 MG
CAPSULE ORAL PRN
OUTPATIENT
Start: 2025-08-19

## 2025-08-19 ASSESSMENT — PAIN SCALES - GENERAL: PAINLEVEL_OUTOF10: 0

## 2025-08-26 ENCOUNTER — HOSPITAL ENCOUNTER (OUTPATIENT)
Facility: HOSPITAL | Age: 75
Discharge: HOME | End: 2025-08-26
Attending: PODIATRIST
Payer: MEDICARE

## 2025-08-26 VITALS
RESPIRATION RATE: 16 BRPM | TEMPERATURE: 97.3 F | SYSTOLIC BLOOD PRESSURE: 152 MMHG | HEART RATE: 88 BPM | DIASTOLIC BLOOD PRESSURE: 81 MMHG

## 2025-08-26 DIAGNOSIS — L97.514 ULCER OF TOE OF RIGHT FOOT, WITH NECROSIS OF BONE (HCC): Primary | ICD-10-CM

## 2025-08-26 PROCEDURE — 11043 DBRDMT MUSC&/FSCA 1ST 20/<: CPT

## 2025-08-26 PROCEDURE — 11042 DBRDMT SUBQ TIS 1ST 20SQCM/<: CPT

## 2025-08-26 RX ORDER — LIDOCAINE HYDROCHLORIDE 40 MG/ML
SOLUTION TOPICAL PRN
OUTPATIENT
Start: 2025-08-26

## 2025-08-26 RX ORDER — CLOBETASOL PROPIONATE 0.5 MG/G
OINTMENT TOPICAL PRN
OUTPATIENT
Start: 2025-08-26

## 2025-08-26 RX ORDER — BETAMETHASONE DIPROPIONATE 0.5 MG/G
CREAM TOPICAL PRN
OUTPATIENT
Start: 2025-08-26

## 2025-08-26 RX ORDER — GENTAMICIN SULFATE 1 MG/G
OINTMENT TOPICAL PRN
OUTPATIENT
Start: 2025-08-26

## 2025-08-26 RX ORDER — SODIUM CHLOR/HYPOCHLOROUS ACID 0.033 %
SOLUTION, IRRIGATION IRRIGATION PRN
OUTPATIENT
Start: 2025-08-26

## 2025-08-26 RX ORDER — LIDOCAINE HYDROCHLORIDE 20 MG/ML
JELLY TOPICAL PRN
OUTPATIENT
Start: 2025-08-26

## 2025-08-26 RX ORDER — LIDOCAINE 50 MG/G
OINTMENT TOPICAL PRN
OUTPATIENT
Start: 2025-08-26

## 2025-08-26 RX ORDER — SILVER SULFADIAZINE 10 MG/G
CREAM TOPICAL PRN
OUTPATIENT
Start: 2025-08-26

## 2025-08-26 RX ORDER — NEOMYCIN/BACITRACIN/POLYMYXINB 3.5-400-5K
OINTMENT (GRAM) TOPICAL PRN
OUTPATIENT
Start: 2025-08-26

## 2025-08-26 RX ORDER — TRIAMCINOLONE ACETONIDE 1 MG/G
OINTMENT TOPICAL PRN
OUTPATIENT
Start: 2025-08-26

## 2025-08-26 RX ORDER — GINSENG 100 MG
CAPSULE ORAL PRN
OUTPATIENT
Start: 2025-08-26

## 2025-08-26 RX ORDER — MUPIROCIN 2 %
OINTMENT (GRAM) TOPICAL PRN
OUTPATIENT
Start: 2025-08-26

## 2025-08-26 RX ORDER — LIDOCAINE 40 MG/G
CREAM TOPICAL PRN
OUTPATIENT
Start: 2025-08-26

## 2025-08-26 RX ORDER — BACITRACIN ZINC AND POLYMYXIN B SULFATE 500; 1000 [USP'U]/G; [USP'U]/G
OINTMENT TOPICAL PRN
OUTPATIENT
Start: 2025-08-26

## 2025-08-26 ASSESSMENT — PAIN SCALES - GENERAL: PAINLEVEL_OUTOF10: 0

## 2025-09-02 ENCOUNTER — HOSPITAL ENCOUNTER (OUTPATIENT)
Facility: HOSPITAL | Age: 75
Discharge: HOME | End: 2025-09-02
Attending: PODIATRIST
Payer: MEDICARE

## 2025-09-02 VITALS
HEART RATE: 109 BPM | RESPIRATION RATE: 16 BRPM | SYSTOLIC BLOOD PRESSURE: 117 MMHG | TEMPERATURE: 97.5 F | DIASTOLIC BLOOD PRESSURE: 84 MMHG

## 2025-09-02 DIAGNOSIS — L97.514 ULCER OF TOE OF RIGHT FOOT, WITH NECROSIS OF BONE (HCC): Primary | ICD-10-CM

## 2025-09-02 PROCEDURE — 11043 DBRDMT MUSC&/FSCA 1ST 20/<: CPT

## 2025-09-02 RX ORDER — LIDOCAINE 40 MG/G
CREAM TOPICAL PRN
OUTPATIENT
Start: 2025-09-02

## 2025-09-02 RX ORDER — GENTAMICIN SULFATE 1 MG/G
OINTMENT TOPICAL PRN
OUTPATIENT
Start: 2025-09-02

## 2025-09-02 RX ORDER — LIDOCAINE HYDROCHLORIDE 20 MG/ML
JELLY TOPICAL PRN
OUTPATIENT
Start: 2025-09-02

## 2025-09-02 RX ORDER — NEOMYCIN/BACITRACIN/POLYMYXINB 3.5-400-5K
OINTMENT (GRAM) TOPICAL PRN
OUTPATIENT
Start: 2025-09-02

## 2025-09-02 RX ORDER — BETAMETHASONE DIPROPIONATE 0.5 MG/G
CREAM TOPICAL PRN
OUTPATIENT
Start: 2025-09-02

## 2025-09-02 RX ORDER — TRIAMCINOLONE ACETONIDE 1 MG/G
OINTMENT TOPICAL PRN
OUTPATIENT
Start: 2025-09-02

## 2025-09-02 RX ORDER — SODIUM CHLOR/HYPOCHLOROUS ACID 0.033 %
SOLUTION, IRRIGATION IRRIGATION PRN
OUTPATIENT
Start: 2025-09-02

## 2025-09-02 RX ORDER — LIDOCAINE HYDROCHLORIDE 40 MG/ML
SOLUTION TOPICAL PRN
OUTPATIENT
Start: 2025-09-02

## 2025-09-02 RX ORDER — SILVER SULFADIAZINE 10 MG/G
CREAM TOPICAL PRN
OUTPATIENT
Start: 2025-09-02

## 2025-09-02 RX ORDER — CLOBETASOL PROPIONATE 0.5 MG/G
OINTMENT TOPICAL PRN
OUTPATIENT
Start: 2025-09-02

## 2025-09-02 RX ORDER — GINSENG 100 MG
CAPSULE ORAL PRN
OUTPATIENT
Start: 2025-09-02

## 2025-09-02 RX ORDER — LIDOCAINE 50 MG/G
OINTMENT TOPICAL PRN
OUTPATIENT
Start: 2025-09-02

## 2025-09-02 RX ORDER — MUPIROCIN 2 %
OINTMENT (GRAM) TOPICAL PRN
OUTPATIENT
Start: 2025-09-02

## 2025-09-02 RX ORDER — BACITRACIN ZINC AND POLYMYXIN B SULFATE 500; 1000 [USP'U]/G; [USP'U]/G
OINTMENT TOPICAL PRN
OUTPATIENT
Start: 2025-09-02

## 2025-09-02 ASSESSMENT — PAIN SCALES - GENERAL: PAINLEVEL_OUTOF10: 0

## 2025-09-05 ENCOUNTER — TELEPHONE (OUTPATIENT)
Facility: HOSPITAL | Age: 75
End: 2025-09-05

## (undated) DEVICE — SUTURE STRATAFIX SYMMETRIC PDS + SZ 1 L18IN ABSRB VLT L48MM SXPP1A400

## (undated) DEVICE — (D)PREP SKN CHLRAPRP APPL 26ML -- CONVERT TO ITEM 371833

## (undated) DEVICE — HANDPIECE SET WITH BONE CLEANING TIP AND SUCTION TUBE: Brand: INTERPULSE

## (undated) DEVICE — STAPLER SKIN H3.9MM WIRE DIA0.58MM CRWN 6.9MM 35 STPL ROT

## (undated) DEVICE — STERILE POLYISOPRENE POWDER-FREE SURGICAL GLOVES WITH EMOLLIENT COATING: Brand: PROTEXIS

## (undated) DEVICE — Device

## (undated) DEVICE — INTENDED FOR TISSUE SEPARATION, AND OTHER PROCEDURES THAT REQUIRE A SHARP SURGICAL BLADE TO PUNCTURE OR CUT.: Brand: BARD-PARKER ® CARBON RIB-BACK BLADES

## (undated) DEVICE — INFECTION CONTROL KIT SYS

## (undated) DEVICE — SWAB CULT DBL W/O CHAR RAYON TIP AMIES GEL CLMN FOR COLL

## (undated) DEVICE — SOLUTION IRRIG 500ML 0.9% SOD CHLO USP POUR PLAS BTL

## (undated) DEVICE — BANDAGE COMPR W6INXL10YD ST M E WHITE/BEIGE

## (undated) DEVICE — DRSG GZ OIL EMUL CURAD 3X3 --

## (undated) DEVICE — BANDAGE COBAN 4 IN COMPR W4INXL5YD FOAM COHESIVE QUIK STK SELF ADH SFT

## (undated) DEVICE — ZIMMER® STERILE DISPOSABLE TOURNIQUET CUFF WITH PROTECTIVE SLEEVE AND PLC, DUAL PORT, SINGLE BLADDER, 18 IN. (46 CM)

## (undated) DEVICE — GLOVE ORANGE PI 7 1/2   MSG9075

## (undated) DEVICE — BASIC GENERAL-SFMC: Brand: MEDLINE INDUSTRIES, INC.

## (undated) DEVICE — HANDPIECE SET WITH COAXIAL HIGH FLOW TIP AND SUCTION TUBE: Brand: INTERPULSE

## (undated) DEVICE — SYR LR LCK 1ML GRAD NSAF 30ML --

## (undated) DEVICE — SUTURE ETHLN SZ 2-0 L18IN NONABSORBABLE BLK L19MM PS-2 PRIM 593H

## (undated) DEVICE — SUTURE MCRYL SZ 3-0 L27IN ABSRB UD L19MM PS-2 3/8 CIR PRIM Y427H

## (undated) DEVICE — T4 HOOD

## (undated) DEVICE — SUTURE VCRL SZ 2 L54IN ABSRB UD L65MM TP-1 1/2 CIR J880T

## (undated) DEVICE — PADDING CAST SPEC 6INX4YD COT --

## (undated) DEVICE — SOLUTION IRRIG 3000ML 0.9% SOD CHL FLX CONT 0797208] ICU MEDICAL INC]

## (undated) DEVICE — SOLUTION IRRIG 1000ML H2O STRL BLT

## (undated) DEVICE — REM POLYHESIVE ADULT PATIENT RETURN ELECTRODE: Brand: VALLEYLAB

## (undated) DEVICE — EXTREMITY-SFMCASU: Brand: MEDLINE INDUSTRIES, INC.

## (undated) DEVICE — STRIP WND PK W0.25INXL5YD IODO GZ TIGHTLY WVN CURAD

## (undated) DEVICE — SOLUTION IRRIG 1000ML 0.9% SOD CHL USP POUR PLAS BTL

## (undated) DEVICE — 4-PORT MANIFOLD: Brand: NEPTUNE 2

## (undated) DEVICE — PADDING CST 4INX4YD --

## (undated) DEVICE — GLOVE SURG SZ 6 THK91MIL LTX FREE SYN POLYISOPRENE ANTI

## (undated) DEVICE — SUTURE VCRL SZ 2-0 L36IN ABSRB UD L40MM CT 1/2 CIR J957H

## (undated) DEVICE — CANISTER, RIGID, 3000CC: Brand: MEDLINE INDUSTRIES, INC.

## (undated) DEVICE — COVER,MAYO STAND,STERILE: Brand: MEDLINE

## (undated) DEVICE — GLOVE ORANGE PI 8   MSG9080

## (undated) DEVICE — TRAY CATH 16F URIN MTR LTX -- CONVERT TO ITEM 363111

## (undated) DEVICE — 2108 SERIES SAGITTAL BLADE (18.6 X 0.8 X 73.8MM)

## (undated) DEVICE — TOWEL SURG W17XL27IN STD BLU COT NONFENESTRATED PREWASHED

## (undated) DEVICE — BLADE ELECTRODE: Brand: EDGE

## (undated) DEVICE — BANDAGE,GAUZE,BULKEE II,4.5"X4.1YD,STRL: Brand: MEDLINE

## (undated) DEVICE — BNDG ELAS HK LOOP 6X5YD NS -- MATRIX

## (undated) DEVICE — CARTRIDGE BNE CEM MIX UNIV TWR VAC ROTOR BRK OFF NOZ W/O

## (undated) DEVICE — BANDAGE COMPR W4INXL5YD WHT BGE POLY COT M E WRP WV HK AND

## (undated) DEVICE — SYRINGE MED 20ML STD CLR PLAS LUERLOCK TIP N CTRL DISP

## (undated) DEVICE — NEEDLE HYPO 21GA L1.5IN INTRAMUSCULAR S STL LATCH BVL UP

## (undated) DEVICE — CATH URETH FOL 2W MED 14FRX5 --

## (undated) DEVICE — CONTAINER,SPECIMEN,3OZ,OR STRL: Brand: MEDLINE

## (undated) DEVICE — KENDALL SCD EXPRESS SLEEVES, KNEE LENGTH, MEDIUM: Brand: KENDALL SCD

## (undated) DEVICE — PAD,ABDOMINAL,5"X9",ST,LF,25/BX: Brand: MEDLINE INDUSTRIES, INC.

## (undated) DEVICE — SUTURE VCRL 0 L27IN ABSRB VLT CT L40MM 1/2 CIR TAPERPOINT J352H

## (undated) DEVICE — BANDAGE COMPR M W6INXL10YD WHT BGE VELC E MTRX HK AND LOOP

## (undated) DEVICE — BLADE SAW W051XL276IN THK005IN CUT THK005IN REPL SAG FLR

## (undated) DEVICE — INSULATED BLADE ELECTRODE: Brand: EDGE

## (undated) DEVICE — HANDLE LT SNAP ON ULT DURABLE LENS FOR TRUMPF ALC DISPOSABLE

## (undated) DEVICE — DRAPE,EXTREMITY,89X128,STERILE: Brand: MEDLINE

## (undated) DEVICE — STERILE POLYISOPRENE POWDER-FREE SURGICAL GLOVES: Brand: PROTEXIS

## (undated) DEVICE — DERMABOND SKIN ADH 0.7ML -- DERMABOND ADVANCED 12/BX

## (undated) DEVICE — BLADE SAW W083XL354IN THK0047IN CUT THK0047IN SAG FLR

## (undated) DEVICE — 6619 2 PTNT ISO SYS INCISE AREA&LT;(&GT;&&LT;)&GT;P: Brand: STERI-DRAPE™ IOBAN™ 2

## (undated) DEVICE — NEEDLE SPNL 22GA L3.5IN BLK HUB S STL REG WALL FIT STYL W/

## (undated) DEVICE — DRSG AQUACEL SURG 3.5 X 10IN -- CONVERT TO ITEM 370183

## (undated) DEVICE — SPONGE GZ W4XL4IN COT 12 PLY TYP VII WVN C FLD DSGN

## (undated) DEVICE — PREMIUM WET SKIN PREP TRAY: Brand: MEDLINE INDUSTRIES, INC.

## (undated) DEVICE — SOLUTION IRRIG 1000ML STRL H2O USP PLAS POUR BTL

## (undated) DEVICE — BLADE SAW W9XL25MM THK0.38MM CUT THK0.43MM REPL SAG OSC THN

## (undated) DEVICE — SUT ETHLN 2-0 18IN FS BLK --

## (undated) DEVICE — SCRUB DRY SURG EZ SCRUB BRUSH PREOPERATIVE GRN

## (undated) DEVICE — SHEET, DRAPE, SPLIT, STERILE: Brand: MEDLINE

## (undated) DEVICE — PADDING CST 6IN STERILE --

## (undated) DEVICE — GLOVE SURG SZ 8 L12IN FNGR THK79MIL GRN LTX FREE

## (undated) DEVICE — DRAPE XR C ARM 41X74IN LF --

## (undated) DEVICE — STRAP,POSITIONING,KNEE/BODY,FOAM,4X60": Brand: MEDLINE

## (undated) DEVICE — SLIM BODY SKIN STAPLER: Brand: APPOSE ULC

## (undated) DEVICE — DEVON™ KNEE AND BODY STRAP 60" X 3" (1.5 M X 7.6 CM): Brand: DEVON

## (undated) DEVICE — STOCKINETTE,IMPERVIOUS,12X48,STERILE: Brand: MEDLINE